# Patient Record
Sex: MALE | Race: WHITE | Employment: OTHER | URBAN - NONMETROPOLITAN AREA
[De-identification: names, ages, dates, MRNs, and addresses within clinical notes are randomized per-mention and may not be internally consistent; named-entity substitution may affect disease eponyms.]

---

## 2017-03-03 ENCOUNTER — TELEPHONE (OUTPATIENT)
Dept: CARDIOLOGY CLINIC | Age: 72
End: 2017-03-03

## 2017-03-17 ENCOUNTER — OFFICE VISIT (OUTPATIENT)
Dept: CARDIOLOGY CLINIC | Age: 72
End: 2017-03-17

## 2017-03-17 VITALS
WEIGHT: 169 LBS | SYSTOLIC BLOOD PRESSURE: 124 MMHG | BODY MASS INDEX: 25.03 KG/M2 | HEART RATE: 63 BPM | HEIGHT: 69 IN | DIASTOLIC BLOOD PRESSURE: 70 MMHG | OXYGEN SATURATION: 98 %

## 2017-03-17 DIAGNOSIS — E78.5 HYPERLIPIDEMIA, UNSPECIFIED HYPERLIPIDEMIA TYPE: ICD-10-CM

## 2017-03-17 DIAGNOSIS — Z72.0 TOBACCO ABUSE: ICD-10-CM

## 2017-03-17 DIAGNOSIS — I10 ESSENTIAL HYPERTENSION, BENIGN: Primary | ICD-10-CM

## 2017-03-17 DIAGNOSIS — I25.83 CORONARY ARTERY DISEASE DUE TO LIPID RICH PLAQUE: ICD-10-CM

## 2017-03-17 DIAGNOSIS — I25.10 CORONARY ARTERY DISEASE DUE TO LIPID RICH PLAQUE: ICD-10-CM

## 2017-03-17 PROCEDURE — 99214 OFFICE O/P EST MOD 30 MIN: CPT | Performed by: INTERNAL MEDICINE

## 2017-05-05 ENCOUNTER — NURSE ONLY (OUTPATIENT)
Dept: FAMILY MEDICINE CLINIC | Age: 72
End: 2017-05-05

## 2017-05-05 ENCOUNTER — TELEPHONE (OUTPATIENT)
Dept: FAMILY MEDICINE CLINIC | Age: 72
End: 2017-05-05

## 2017-05-05 DIAGNOSIS — E78.5 HYPERLIPIDEMIA, UNSPECIFIED HYPERLIPIDEMIA TYPE: Primary | ICD-10-CM

## 2017-05-05 DIAGNOSIS — E78.5 HYPERLIPIDEMIA, UNSPECIFIED HYPERLIPIDEMIA TYPE: ICD-10-CM

## 2017-05-05 PROCEDURE — 36415 COLL VENOUS BLD VENIPUNCTURE: CPT | Performed by: FAMILY MEDICINE

## 2017-05-06 LAB
ALBUMIN SERPL-MCNC: 4.6 G/DL (ref 3.4–5)
ALP BLD-CCNC: 97 U/L (ref 40–129)
ALT SERPL-CCNC: 12 U/L (ref 10–40)
AST SERPL-CCNC: 16 U/L (ref 15–37)
BILIRUB SERPL-MCNC: 1 MG/DL (ref 0–1)
BILIRUBIN DIRECT: <0.2 MG/DL (ref 0–0.3)
BILIRUBIN, INDIRECT: NORMAL MG/DL (ref 0–1)
CHOLESTEROL, TOTAL: 110 MG/DL (ref 0–199)
HDLC SERPL-MCNC: 29 MG/DL (ref 40–60)
LDL CHOLESTEROL CALCULATED: 61 MG/DL
TOTAL PROTEIN: 7.2 G/DL (ref 6.4–8.2)
TRIGL SERPL-MCNC: 102 MG/DL (ref 0–150)
VLDLC SERPL CALC-MCNC: 20 MG/DL

## 2017-05-10 ENCOUNTER — OFFICE VISIT (OUTPATIENT)
Dept: FAMILY MEDICINE CLINIC | Age: 72
End: 2017-05-10

## 2017-05-10 VITALS
WEIGHT: 166.4 LBS | HEART RATE: 82 BPM | BODY MASS INDEX: 24.65 KG/M2 | OXYGEN SATURATION: 98 % | HEIGHT: 69 IN | DIASTOLIC BLOOD PRESSURE: 76 MMHG | SYSTOLIC BLOOD PRESSURE: 122 MMHG

## 2017-05-10 DIAGNOSIS — Z23 NEED FOR PROPHYLACTIC VACCINATION AND INOCULATION AGAINST VARICELLA: ICD-10-CM

## 2017-05-10 DIAGNOSIS — E78.2 MIXED HYPERLIPIDEMIA: ICD-10-CM

## 2017-05-10 DIAGNOSIS — Z72.0 TOBACCO ABUSE: ICD-10-CM

## 2017-05-10 DIAGNOSIS — I25.83 CORONARY ARTERY DISEASE DUE TO LIPID RICH PLAQUE: ICD-10-CM

## 2017-05-10 DIAGNOSIS — I10 ESSENTIAL HYPERTENSION, BENIGN: Primary | ICD-10-CM

## 2017-05-10 DIAGNOSIS — Z12.5 PROSTATE CANCER SCREENING: ICD-10-CM

## 2017-05-10 DIAGNOSIS — I25.10 CORONARY ARTERY DISEASE DUE TO LIPID RICH PLAQUE: ICD-10-CM

## 2017-05-10 DIAGNOSIS — E55.9 VITAMIN D DEFICIENCY: ICD-10-CM

## 2017-05-10 PROCEDURE — G8510 SCR DEP NEG, NO PLAN REQD: HCPCS | Performed by: FAMILY MEDICINE

## 2017-05-10 PROCEDURE — 99214 OFFICE O/P EST MOD 30 MIN: CPT | Performed by: FAMILY MEDICINE

## 2017-05-10 PROCEDURE — 3288F FALL RISK ASSESSMENT DOCD: CPT | Performed by: FAMILY MEDICINE

## 2017-05-10 RX ORDER — NITROGLYCERIN 0.4 MG/1
0.4 TABLET SUBLINGUAL EVERY 5 MIN PRN
Qty: 25 TABLET | Refills: 1 | Status: SHIPPED | OUTPATIENT
Start: 2017-05-10 | End: 2018-06-18 | Stop reason: SDUPTHER

## 2017-05-10 ASSESSMENT — PATIENT HEALTH QUESTIONNAIRE - PHQ9
SUM OF ALL RESPONSES TO PHQ QUESTIONS 1-9: 0
2. FEELING DOWN, DEPRESSED OR HOPELESS: 0
1. LITTLE INTEREST OR PLEASURE IN DOING THINGS: 0
SUM OF ALL RESPONSES TO PHQ9 QUESTIONS 1 & 2: 0

## 2017-05-12 PROBLEM — E78.2 MIXED HYPERLIPIDEMIA: Status: ACTIVE | Noted: 2017-05-12

## 2017-06-25 DIAGNOSIS — I25.10 CORONARY ARTERY DISEASE DUE TO LIPID RICH PLAQUE: ICD-10-CM

## 2017-06-25 DIAGNOSIS — I25.83 CORONARY ARTERY DISEASE DUE TO LIPID RICH PLAQUE: ICD-10-CM

## 2017-06-26 RX ORDER — CLOPIDOGREL BISULFATE 75 MG/1
TABLET ORAL
Qty: 90 TABLET | Refills: 3 | Status: SHIPPED | OUTPATIENT
Start: 2017-06-26 | End: 2018-06-18 | Stop reason: SDUPTHER

## 2017-07-19 DIAGNOSIS — I25.83 CORONARY ARTERY DISEASE DUE TO LIPID RICH PLAQUE: ICD-10-CM

## 2017-07-19 DIAGNOSIS — I25.10 CORONARY ARTERY DISEASE DUE TO LIPID RICH PLAQUE: ICD-10-CM

## 2017-07-19 DIAGNOSIS — I10 ESSENTIAL HYPERTENSION, BENIGN: ICD-10-CM

## 2017-07-19 RX ORDER — AMLODIPINE BESYLATE 10 MG/1
TABLET ORAL
Qty: 90 TABLET | Refills: 3 | Status: SHIPPED | OUTPATIENT
Start: 2017-07-19 | End: 2018-06-18 | Stop reason: SDUPTHER

## 2017-09-08 DIAGNOSIS — E78.5 HYPERLIPIDEMIA: ICD-10-CM

## 2017-09-08 RX ORDER — SIMVASTATIN 40 MG
TABLET ORAL
Qty: 90 TABLET | Refills: 1 | Status: SHIPPED | OUTPATIENT
Start: 2017-09-08 | End: 2018-03-05 | Stop reason: SDUPTHER

## 2017-11-21 ENCOUNTER — TELEPHONE (OUTPATIENT)
Dept: FAMILY MEDICINE CLINIC | Age: 72
End: 2017-11-21

## 2017-11-21 DIAGNOSIS — Z12.11 COLON CANCER SCREENING: Primary | ICD-10-CM

## 2017-12-04 ENCOUNTER — OFFICE VISIT (OUTPATIENT)
Dept: FAMILY MEDICINE CLINIC | Age: 72
End: 2017-12-04

## 2017-12-04 VITALS
HEIGHT: 69 IN | DIASTOLIC BLOOD PRESSURE: 75 MMHG | OXYGEN SATURATION: 97 % | HEART RATE: 68 BPM | WEIGHT: 168.8 LBS | BODY MASS INDEX: 25 KG/M2 | TEMPERATURE: 97.8 F | SYSTOLIC BLOOD PRESSURE: 124 MMHG

## 2017-12-04 DIAGNOSIS — E78.2 MIXED HYPERLIPIDEMIA: ICD-10-CM

## 2017-12-04 DIAGNOSIS — E55.9 VITAMIN D DEFICIENCY: ICD-10-CM

## 2017-12-04 DIAGNOSIS — I10 ESSENTIAL HYPERTENSION, BENIGN: ICD-10-CM

## 2017-12-04 DIAGNOSIS — I25.83 CORONARY ARTERY DISEASE DUE TO LIPID RICH PLAQUE: Primary | ICD-10-CM

## 2017-12-04 DIAGNOSIS — Z12.5 PROSTATE CANCER SCREENING: ICD-10-CM

## 2017-12-04 DIAGNOSIS — Z12.11 COLON CANCER SCREENING: ICD-10-CM

## 2017-12-04 DIAGNOSIS — N52.9 ERECTILE DYSFUNCTION, UNSPECIFIED ERECTILE DYSFUNCTION TYPE: ICD-10-CM

## 2017-12-04 DIAGNOSIS — R19.5 POSITIVE FIT (FECAL IMMUNOCHEMICAL TEST): ICD-10-CM

## 2017-12-04 DIAGNOSIS — Z23 NEED FOR PROPHYLACTIC VACCINATION AND INOCULATION AGAINST VARICELLA: ICD-10-CM

## 2017-12-04 DIAGNOSIS — Z13.6 ENCOUNTER FOR ABDOMINAL AORTIC ANEURYSM (AAA) SCREENING: ICD-10-CM

## 2017-12-04 DIAGNOSIS — I25.10 CORONARY ARTERY DISEASE DUE TO LIPID RICH PLAQUE: Primary | ICD-10-CM

## 2017-12-04 DIAGNOSIS — Z23 NEED FOR INFLUENZA VACCINATION: ICD-10-CM

## 2017-12-04 DIAGNOSIS — Z72.0 TOBACCO ABUSE: ICD-10-CM

## 2017-12-04 LAB
CONTROL: PRESENT
HEMOCCULT STL QL: POSITIVE

## 2017-12-04 PROCEDURE — 36415 COLL VENOUS BLD VENIPUNCTURE: CPT | Performed by: FAMILY MEDICINE

## 2017-12-04 PROCEDURE — 82274 ASSAY TEST FOR BLOOD FECAL: CPT | Performed by: FAMILY MEDICINE

## 2017-12-04 PROCEDURE — 90688 IIV4 VACCINE SPLT 0.5 ML IM: CPT | Performed by: FAMILY MEDICINE

## 2017-12-04 PROCEDURE — 99214 OFFICE O/P EST MOD 30 MIN: CPT | Performed by: FAMILY MEDICINE

## 2017-12-04 PROCEDURE — G0008 ADMIN INFLUENZA VIRUS VAC: HCPCS | Performed by: FAMILY MEDICINE

## 2017-12-04 NOTE — PROGRESS NOTES
Patient presented today for Influenza vaccine. Patient tolerated with no reaction. Patient informed to call the office if any concerns. Vaccine Information Sheet, \"Influenza - Inactivated\"  given to Sharda Rowland,  and verbalized understanding. Patient responses:    Have you ever had a reaction to a flu vaccine? No  Are you able to eat eggs without adverse effects? Yes  Do you have any current illness? No  Have you ever had Guillian [place] Syndrome? No    Flu vaccine given per order. Please see immunization tab.

## 2017-12-04 NOTE — PROGRESS NOTES
SUBJECTIVE:    2017    Patricio Ritchie (: 1945)    71 y.o.    male    Prior to Visit Medications    Medication Sig Taking? Authorizing Provider   simvastatin (ZOCOR) 40 MG tablet TAKE 1 TABLET BY MOUTH NIGHTLY Yes Neal Iraheta MD   amLODIPine (NORVASC) 10 MG tablet TAKE 1 TABLET BY MOUTH DAILY. Yes Neal Iraheta MD   clopidogrel (PLAVIX) 75 MG tablet TAKE 1 TABLET BY MOUTH DAILY Yes Neal Iraheta MD   nitroGLYCERIN (NITROSTAT) 0.4 MG SL tablet Place 1 tablet under the tongue every 5 minutes as needed for Chest pain Yes Neal Iraheta MD   Blood Pressure Monitoring QUARTERMAIN) MISC 1 kit by Does not apply route daily Yes Neal Iraheta MD   tadalafil (CIALIS) 20 MG tablet Take 1 tablet by mouth as needed for Erectile Dysfunction Yes Valery Prince NP   lisinopril (PRINIVIL;ZESTRIL) 20 MG tablet Take 1 tablet by mouth daily Yes Presley Prince NP   [facility] VIT D 5000 HIGH-POTENCY 5000 UNITS CAPS capsule TAKE ONE CAPSULE BY MOUTH DAILY Yes Neal Iraheta MD   Omega 3 1000 MG CAPS Take  by mouth daily. Yes Historical Provider, MD   aspirin 81 MG EC tablet Take 81 mg by mouth daily. Yes Historical Provider, MD       ALLERGIES:  Allergies   Allergen Reactions    Ampicillin        Chief Complaint   Patient presents with    Hypertension    Hyperlipidemia    Coronary Artery Disease    Other     Vitamin D Deficiency    URI     Runny nose, congested x 3-4 days. Has hard time taking Fish Oil on time due to timing. Takes Cialis PRN. Not interested in Low Dose CT Lung. Has sinus infection x 3-4 days. Discussed positive FIT test and agreed to colonoscopy. HPI  Patricio Ritchie is asymptomatic from a coronary standpoint. No chest pain. No difficulty breathing. He states he doesn't really want to know if he would have lung cancer or not, but he is okay getting a colonoscopy. Is here for high blood pressure. Watching salt intake. Blood pressure checked at home - NO. Numbers good if checked? NA. Denies chest pain. Denies shortness of breath. Taking medications as prescribed. Is here for cholesterol. Tolerating medication. Trying to watch low-fat diet. Weight stable. No change in exercise. HX: (> 3 status chronic/inact. Prob) or  Wt Readings from Last 3 Encounters:   12/04/17 168 lb 12.8 oz (76.6 kg)   05/10/17 166 lb 6.4 oz (75.5 kg)   03/17/17 169 lb (76.7 kg)       Occupation Retired            HPI:  (>4 )  LOCATION:  QUALITY:SEVERITY:  DURATION:  TIMING:  CONTEXT:  MOD. FACTORS:  ASSOC. S/S:    Pertinent items are noted in HPI.  (+/- 2-9 systems)  Review of Systems   HENT: Positive for congestion ( and Runny nose). All other systems reviewed and are negative except as noted above  Additional review of systems may be scanned into the media section of this medical record. Any responses requiring further intervention were pursued. Past Medical History:   Diagnosis Date    CAD (coronary artery disease)     Carotid bruit     ED (erectile dysfunction)     Hyperlipidemia     Hypertension     Tobacco abuse        History reviewed. No pertinent family history.     Social History   Substance Use Topics    Smoking status: Current Every Day Smoker     Packs/day: 2.00    Smokeless tobacco: Never Used    Alcohol use No         Past Surgical History:   Procedure Laterality Date    CORONARY ANGIOPLASTY WITH STENT PLACEMENT         Immunization History   Administered Date(s) Administered    Influenza Virus Vaccine 10/23/2013, 11/10/2014, 11/11/2015    Influenza, Suellyn Morning, 3 Years and older, IM 11/10/2016    Pneumococcal 13-valent Conjugate (Befumsp96) 05/11/2015    Pneumococcal Polysaccharide (Nucvrupxz07) 04/17/2013    Tdap (Boostrix, Adacel) 05/11/2015       Vitals:    12/04/17 1133   BP: 124/75   Site: Left Arm   Position: Sitting   Cuff Size: Medium Adult   Pulse: 68   Temp: 97.8 °F (36.6 °C)   TempSrc: Temporal inoculation against varicella  -     zoster vaccine live, PF, (ZOSTAVAX) 41677 UNT/0.65ML injection; Inject 0.65 mLs into the skin once for 1 dose    Encounter for abdominal aortic aneurysm (AAA) screening  -     [place] screening for AAA; Future    Need for influenza vaccination  -     INFLUENZA, QUADV, 3 YRS AND OLDER, IM, MDV, 0.5ML (Apolinar Yadav)    Erectile dysfunction, unspecified erectile dysfunction type    Essential hypertension, benign  -     COMPREHENSIVE METABOLIC PANEL    Mixed hyperlipidemia    Vitamin D deficiency  -     VITAMIN D 25 HYDROXY    Prostate cancer screening  -     Psa screening    Positive FIT (fecal immunochemical test)  -     Mynor Sellers MD    Tobacco abuse    Other orders  -     Cancel: CT Lung Screening; Future  -     Cancel: Direct Screening Colonoscopy Referral  -     Cancel: Endoscopy, colon, diagnostic; Future          PLAN:    Colonoscopy, Influenza injection, Labs as indicated. Patient agrees with the colonoscopy although he does not with the CT lung screen but understands the risk. Tobacco abuse - Pt will not quit despite understanding of risks of continued tobacco use, including cancer, heart attacks, strokes or death. Lipids been acceptable blood pressure acceptable, fractions of Cialis work for him. Labs to be updated. He will have the ultrasound of the abdominal aorta. Asymptomatic from the coronary standpoint. Follow-up 6 months. Patient should call the office immediately with new or ongoing signs or symptoms or worsening, or proceed to the emergency room. All entries in chief complaint and history of present illness are reviewed and validated by me. No changes in past medical history, past surgical history, social history, or family history were noted during the patient encounter unless specifically listed above.   All updates of past medical history, past surgical history, social history, or family history were reviewed personally by me during the office visit. All problems listed in the assessment are stable unless noted otherwise. Medication profile reviewed personally by me during the office visit. Medication side effects and possible impairments from medications were discussed as applicable. Every effort has been made to assure accurate transcription by this voice recognition software. However, mistakes in transcription may still occur        IJose Manuel, am scribing for and in the presence of Kiersten Baumann MD. Electronically signed by Jose Manuel Santiago on 12/4/2017 at 12:28 PM    I, Dr. Fauzia Juan, personally performed the services described in this documentation, as scribed by the above signed scribe in my presence, and it is both accurate and complete. I agree with the Chief Complaint, ROS, and Past Histories independently gathered by the clinical support staff and the remaining scribed note accurately describes my personal service to the patient.       12/4/2017    1:13 PM

## 2017-12-05 LAB
A/G RATIO: 1.8 (ref 1.1–2.2)
ALBUMIN SERPL-MCNC: 4.8 G/DL (ref 3.4–5)
ALP BLD-CCNC: 97 U/L (ref 40–129)
ALT SERPL-CCNC: 15 U/L (ref 10–40)
ANION GAP SERPL CALCULATED.3IONS-SCNC: 17 MMOL/L (ref 3–16)
AST SERPL-CCNC: 19 U/L (ref 15–37)
BILIRUB SERPL-MCNC: 0.8 MG/DL (ref 0–1)
BUN BLDV-MCNC: 17 MG/DL (ref 7–20)
CALCIUM SERPL-MCNC: 9.6 MG/DL (ref 8.3–10.6)
CHLORIDE BLD-SCNC: 103 MMOL/L (ref 99–110)
CO2: 24 MMOL/L (ref 21–32)
CREAT SERPL-MCNC: 1 MG/DL (ref 0.8–1.3)
GFR AFRICAN AMERICAN: >60
GFR NON-AFRICAN AMERICAN: >60
GLOBULIN: 2.7 G/DL
GLUCOSE BLD-MCNC: 84 MG/DL (ref 70–99)
POTASSIUM SERPL-SCNC: 4.2 MMOL/L (ref 3.5–5.1)
PROSTATE SPECIFIC ANTIGEN: 2.68 NG/ML (ref 0–4)
SODIUM BLD-SCNC: 144 MMOL/L (ref 136–145)
TOTAL PROTEIN: 7.5 G/DL (ref 6.4–8.2)
VITAMIN D 25-HYDROXY: 46.8 NG/ML

## 2018-01-20 DIAGNOSIS — I10 ESSENTIAL HYPERTENSION, BENIGN: ICD-10-CM

## 2018-01-22 RX ORDER — LISINOPRIL 20 MG/1
TABLET ORAL
Qty: 90 TABLET | Refills: 3 | Status: SHIPPED | OUTPATIENT
Start: 2018-01-22 | End: 2018-02-07

## 2018-03-05 DIAGNOSIS — E78.5 HYPERLIPIDEMIA: ICD-10-CM

## 2018-03-05 RX ORDER — SIMVASTATIN 40 MG
TABLET ORAL
Qty: 90 TABLET | Refills: 3 | Status: SHIPPED | OUTPATIENT
Start: 2018-03-05 | End: 2018-06-18 | Stop reason: SDUPTHER

## 2018-04-13 ENCOUNTER — OFFICE VISIT (OUTPATIENT)
Dept: CARDIOLOGY CLINIC | Age: 73
End: 2018-04-13

## 2018-04-13 VITALS
HEART RATE: 79 BPM | DIASTOLIC BLOOD PRESSURE: 70 MMHG | WEIGHT: 164.08 LBS | SYSTOLIC BLOOD PRESSURE: 138 MMHG | OXYGEN SATURATION: 91 % | BODY MASS INDEX: 24.3 KG/M2 | HEIGHT: 69 IN

## 2018-04-13 DIAGNOSIS — I10 ESSENTIAL HYPERTENSION, BENIGN: Primary | ICD-10-CM

## 2018-04-13 DIAGNOSIS — I25.83 CORONARY ARTERY DISEASE DUE TO LIPID RICH PLAQUE: ICD-10-CM

## 2018-04-13 DIAGNOSIS — E78.2 MIXED HYPERLIPIDEMIA: ICD-10-CM

## 2018-04-13 DIAGNOSIS — I25.10 CORONARY ARTERY DISEASE DUE TO LIPID RICH PLAQUE: ICD-10-CM

## 2018-04-13 PROCEDURE — 99214 OFFICE O/P EST MOD 30 MIN: CPT | Performed by: INTERNAL MEDICINE

## 2018-06-06 ENCOUNTER — TELEPHONE (OUTPATIENT)
Dept: FAMILY MEDICINE CLINIC | Age: 73
End: 2018-06-06

## 2018-06-11 ENCOUNTER — NURSE ONLY (OUTPATIENT)
Dept: FAMILY MEDICINE CLINIC | Age: 73
End: 2018-06-11

## 2018-06-11 DIAGNOSIS — I10 ESSENTIAL HYPERTENSION, BENIGN: Primary | ICD-10-CM

## 2018-06-11 DIAGNOSIS — I25.83 CORONARY ARTERY DISEASE DUE TO LIPID RICH PLAQUE: ICD-10-CM

## 2018-06-11 DIAGNOSIS — E55.9 VITAMIN D DEFICIENCY: ICD-10-CM

## 2018-06-11 DIAGNOSIS — I25.10 CORONARY ARTERY DISEASE DUE TO LIPID RICH PLAQUE: ICD-10-CM

## 2018-06-11 LAB
A/G RATIO: 2 (ref 1.1–2.2)
ALBUMIN SERPL-MCNC: 4.5 G/DL (ref 3.4–5)
ALP BLD-CCNC: 83 U/L (ref 40–129)
ALT SERPL-CCNC: 14 U/L (ref 10–40)
ANION GAP SERPL CALCULATED.3IONS-SCNC: 15 MMOL/L (ref 3–16)
AST SERPL-CCNC: 18 U/L (ref 15–37)
BILIRUB SERPL-MCNC: 0.9 MG/DL (ref 0–1)
BUN BLDV-MCNC: 20 MG/DL (ref 7–20)
CALCIUM SERPL-MCNC: 9.2 MG/DL (ref 8.3–10.6)
CHLORIDE BLD-SCNC: 107 MMOL/L (ref 99–110)
CHOLESTEROL, TOTAL: 121 MG/DL (ref 0–199)
CO2: 22 MMOL/L (ref 21–32)
CREAT SERPL-MCNC: 1 MG/DL (ref 0.8–1.3)
GFR AFRICAN AMERICAN: >60
GFR NON-AFRICAN AMERICAN: >60
GLOBULIN: 2.3 G/DL
GLUCOSE BLD-MCNC: 92 MG/DL (ref 70–99)
HDLC SERPL-MCNC: 32 MG/DL (ref 40–60)
LDL CHOLESTEROL CALCULATED: 71 MG/DL
POTASSIUM SERPL-SCNC: 4.5 MMOL/L (ref 3.5–5.1)
SODIUM BLD-SCNC: 144 MMOL/L (ref 136–145)
TOTAL PROTEIN: 6.8 G/DL (ref 6.4–8.2)
TRIGL SERPL-MCNC: 92 MG/DL (ref 0–150)
TSH SERPL DL<=0.05 MIU/L-ACNC: 2.31 UIU/ML (ref 0.27–4.2)
VITAMIN D 25-HYDROXY: 31.9 NG/ML
VLDLC SERPL CALC-MCNC: 18 MG/DL

## 2018-06-11 PROCEDURE — 36415 COLL VENOUS BLD VENIPUNCTURE: CPT | Performed by: FAMILY MEDICINE

## 2018-06-18 ENCOUNTER — OFFICE VISIT (OUTPATIENT)
Dept: FAMILY MEDICINE CLINIC | Age: 73
End: 2018-06-18

## 2018-06-18 VITALS
OXYGEN SATURATION: 97 % | WEIGHT: 160.2 LBS | HEART RATE: 68 BPM | HEIGHT: 69 IN | DIASTOLIC BLOOD PRESSURE: 66 MMHG | RESPIRATION RATE: 16 BRPM | BODY MASS INDEX: 23.73 KG/M2 | SYSTOLIC BLOOD PRESSURE: 118 MMHG

## 2018-06-18 DIAGNOSIS — I25.10 CORONARY ARTERY DISEASE DUE TO LIPID RICH PLAQUE: ICD-10-CM

## 2018-06-18 DIAGNOSIS — I10 ESSENTIAL HYPERTENSION, BENIGN: ICD-10-CM

## 2018-06-18 DIAGNOSIS — E78.2 MIXED HYPERLIPIDEMIA: ICD-10-CM

## 2018-06-18 DIAGNOSIS — E55.9 VITAMIN D DEFICIENCY: Primary | ICD-10-CM

## 2018-06-18 DIAGNOSIS — E78.5 HYPERLIPIDEMIA, UNSPECIFIED HYPERLIPIDEMIA TYPE: ICD-10-CM

## 2018-06-18 DIAGNOSIS — N52.9 ERECTILE DYSFUNCTION, UNSPECIFIED ERECTILE DYSFUNCTION TYPE: ICD-10-CM

## 2018-06-18 DIAGNOSIS — I25.83 CORONARY ARTERY DISEASE DUE TO LIPID RICH PLAQUE: ICD-10-CM

## 2018-06-18 DIAGNOSIS — Z72.0 TOBACCO ABUSE: ICD-10-CM

## 2018-06-18 PROCEDURE — 99214 OFFICE O/P EST MOD 30 MIN: CPT | Performed by: FAMILY MEDICINE

## 2018-06-18 RX ORDER — NITROGLYCERIN 0.4 MG/1
0.4 TABLET SUBLINGUAL EVERY 5 MIN PRN
Qty: 25 TABLET | Refills: 1 | Status: SHIPPED | OUTPATIENT
Start: 2018-06-18 | End: 2022-06-30 | Stop reason: SDUPTHER

## 2018-06-18 RX ORDER — SIMVASTATIN 40 MG
TABLET ORAL
Qty: 90 TABLET | Refills: 3 | Status: SHIPPED | OUTPATIENT
Start: 2018-06-18 | End: 2019-09-09 | Stop reason: SDUPTHER

## 2018-06-18 RX ORDER — CLOPIDOGREL BISULFATE 75 MG/1
TABLET ORAL
Qty: 90 TABLET | Refills: 3 | Status: SHIPPED | OUTPATIENT
Start: 2018-06-18 | End: 2019-06-12 | Stop reason: SDUPTHER

## 2018-06-18 RX ORDER — TADALAFIL 20 MG/1
20 TABLET ORAL PRN
Qty: 10 TABLET | Refills: 3 | Status: SHIPPED | OUTPATIENT
Start: 2018-06-18

## 2018-06-18 RX ORDER — TADALAFIL 20 MG/1
20 TABLET ORAL PRN
Qty: 10 TABLET | Refills: 3 | Status: SHIPPED | OUTPATIENT
Start: 2018-06-18 | End: 2018-06-18 | Stop reason: SDUPTHER

## 2018-06-18 RX ORDER — AMLODIPINE BESYLATE 10 MG/1
TABLET ORAL
Qty: 90 TABLET | Refills: 3 | Status: SHIPPED | OUTPATIENT
Start: 2018-06-18 | End: 2019-07-10 | Stop reason: SDUPTHER

## 2018-06-18 RX ORDER — NITROGLYCERIN 0.4 MG/1
0.4 TABLET SUBLINGUAL EVERY 5 MIN PRN
Qty: 25 TABLET | Refills: 1 | Status: CANCELLED | OUTPATIENT
Start: 2018-06-18

## 2018-06-18 ASSESSMENT — PATIENT HEALTH QUESTIONNAIRE - PHQ9
SUM OF ALL RESPONSES TO PHQ QUESTIONS 1-9: 0
SUM OF ALL RESPONSES TO PHQ9 QUESTIONS 1 & 2: 0
1. LITTLE INTEREST OR PLEASURE IN DOING THINGS: 0
2. FEELING DOWN, DEPRESSED OR HOPELESS: 0

## 2019-01-07 ENCOUNTER — OFFICE VISIT (OUTPATIENT)
Dept: FAMILY MEDICINE CLINIC | Age: 74
End: 2019-01-07
Payer: MEDICARE

## 2019-01-07 VITALS
HEART RATE: 76 BPM | OXYGEN SATURATION: 98 % | DIASTOLIC BLOOD PRESSURE: 70 MMHG | SYSTOLIC BLOOD PRESSURE: 120 MMHG | HEIGHT: 68 IN | WEIGHT: 165.8 LBS | BODY MASS INDEX: 25.13 KG/M2

## 2019-01-07 DIAGNOSIS — E55.9 VITAMIN D DEFICIENCY: Primary | ICD-10-CM

## 2019-01-07 DIAGNOSIS — Z13.6 ENCOUNTER FOR ABDOMINAL AORTIC ANEURYSM (AAA) SCREENING: Primary | ICD-10-CM

## 2019-01-07 DIAGNOSIS — E78.2 MIXED HYPERLIPIDEMIA: ICD-10-CM

## 2019-01-07 DIAGNOSIS — R19.5 POSITIVE FIT (FECAL IMMUNOCHEMICAL TEST): ICD-10-CM

## 2019-01-07 DIAGNOSIS — Z12.5 PROSTATE CANCER SCREENING: ICD-10-CM

## 2019-01-07 DIAGNOSIS — Z11.59 ENCOUNTER FOR HEPATITIS C SCREENING TEST FOR LOW RISK PATIENT: ICD-10-CM

## 2019-01-07 DIAGNOSIS — I10 ESSENTIAL HYPERTENSION, BENIGN: ICD-10-CM

## 2019-01-07 DIAGNOSIS — I25.83 CORONARY ARTERY DISEASE DUE TO LIPID RICH PLAQUE: ICD-10-CM

## 2019-01-07 DIAGNOSIS — Z23 NEED FOR INFLUENZA VACCINATION: ICD-10-CM

## 2019-01-07 DIAGNOSIS — Z72.0 TOBACCO ABUSE: ICD-10-CM

## 2019-01-07 DIAGNOSIS — I25.10 CORONARY ARTERY DISEASE DUE TO LIPID RICH PLAQUE: ICD-10-CM

## 2019-01-07 LAB
BASOPHILS ABSOLUTE: 0 K/UL (ref 0–0.2)
BASOPHILS RELATIVE PERCENT: 1.1 %
EOSINOPHILS ABSOLUTE: 0.3 K/UL (ref 0–0.6)
EOSINOPHILS RELATIVE PERCENT: 5.5 %
HCT VFR BLD CALC: 41.8 % (ref 40.5–52.5)
HEMOGLOBIN: 14 G/DL (ref 13.5–17.5)
LYMPHOCYTES ABSOLUTE: 0.7 K/UL (ref 1–5.1)
LYMPHOCYTES RELATIVE PERCENT: 16.1 %
MCH RBC QN AUTO: 33.1 PG (ref 26–34)
MCHC RBC AUTO-ENTMCNC: 33.5 G/DL (ref 31–36)
MCV RBC AUTO: 98.9 FL (ref 80–100)
MONOCYTES ABSOLUTE: 0.4 K/UL (ref 0–1.3)
MONOCYTES RELATIVE PERCENT: 7.8 %
NEUTROPHILS ABSOLUTE: 3.2 K/UL (ref 1.7–7.7)
NEUTROPHILS RELATIVE PERCENT: 69.5 %
PDW BLD-RTO: 15.1 % (ref 12.4–15.4)
PLATELET # BLD: 115 K/UL (ref 135–450)
PMV BLD AUTO: 10.1 FL (ref 5–10.5)
RBC # BLD: 4.23 M/UL (ref 4.2–5.9)
WBC # BLD: 4.6 K/UL (ref 4–11)

## 2019-01-07 PROCEDURE — 36415 COLL VENOUS BLD VENIPUNCTURE: CPT | Performed by: FAMILY MEDICINE

## 2019-01-07 PROCEDURE — G0008 ADMIN INFLUENZA VIRUS VAC: HCPCS | Performed by: FAMILY MEDICINE

## 2019-01-07 PROCEDURE — 90688 IIV4 VACCINE SPLT 0.5 ML IM: CPT | Performed by: FAMILY MEDICINE

## 2019-01-07 PROCEDURE — 99214 OFFICE O/P EST MOD 30 MIN: CPT | Performed by: FAMILY MEDICINE

## 2019-01-08 LAB
HEPATITIS C ANTIBODY INTERPRETATION: NORMAL
PROSTATE SPECIFIC ANTIGEN: 3.5 NG/ML (ref 0–4)
VITAMIN D 25-HYDROXY: 26.7 NG/ML

## 2019-01-09 DIAGNOSIS — E55.9 VITAMIN D DEFICIENCY: Primary | ICD-10-CM

## 2019-01-25 DIAGNOSIS — I10 ESSENTIAL HYPERTENSION, BENIGN: ICD-10-CM

## 2019-01-25 RX ORDER — LISINOPRIL 20 MG/1
20 TABLET ORAL DAILY
Qty: 90 TABLET | Refills: 3 | Status: SHIPPED | OUTPATIENT
Start: 2019-01-25 | End: 2020-01-16

## 2019-04-23 NOTE — PROGRESS NOTES
Disp: 10 tablet, Rfl: 3    Blood Pressure Monitoring (SPHYGMOMANOMETER) MISC, 1 kit by Does not apply route daily, Disp: 1 each, Rfl: 0    [employer]S VIT D 5000 HIGH-POTENCY 5000 UNITS CAPS capsule, TAKE ONE CAPSULE BY MOUTH DAILY, Disp: 30 capsule, Rfl: 11    Omega 3 1000 MG CAPS, Take by mouth daily , Disp: , Rfl:     aspirin 81 MG EC tablet, Take 81 mg by mouth daily. , Disp: , Rfl:     Review of Systems:  Cardiac: No chest pain and no palpitations. Respiratory: No new SOB, PND, orthopnea or cough. Neurological: No syncope or TIA. General: No major weight gain or loss, no fatigue, no weakness, no fever. Musculoskeletal: No new muscle or joint pain. GI: No change in bowel habits. Psychiatric: No anxiety, no panic, no insomnia, no depression. Skin: No rash. Lab Results   Component Value Date    CHOL 121 06/11/2018     No results for input(s): AST, ALT, ALB, BILIDIR, BILITOT, ALKPHOS in the last 72 hours. Vitals:    04/26/19 1344   BP: 122/70   Pulse: 60   SpO2: 98%       Physical Examination  Constitutional: He is oriented to person, place and time. He appears well developed and well nourished. Head: Normocephalic and atraumatic. Neck: Neck supple. No JVD present. Carotid bruit is not present. No mass and no thyromegaly present. Cardiovascular: Normal rate, regular rhythm, normal heart sounds and intact distal pulses. Exam reveals no gallop and no friction rub. No murmur heard. Pulmonary/Chest: Effort normal and breath sounds normal.  No respiratory distress. He has no wheezes, rhonchi or rales. Abdominal: Soft, non-tender. Bowel sounds and aorta are normal.  He exhibits no organomegaly, mass or bruit. Extremities: No edema, cyanosis or clubbing. Neurological: He is alert, and oriented to person, place and time. He has normal reflexes. No cranial nerve deficit. Coordination normal.  Skin: Skin is warm and dry. There is no rash or diaphoresis. Phychiatric: He has a normal mood and affect.  He speech is normal and behavior is normal.  Judgement, cognition and memory are normal.    No components found for: CHLPLTotal Cholesterol 139      10/28/2014   Lab Results   Component Value Date    TRIG 92 06/11/2018    TRIG 102 05/05/2017    TRIG 132 10/24/2016     Lab Results   Component Value Date    HDL 32 (L) 06/11/2018    HDL 29 (L) 05/05/2017    HDL 31 (L) 10/24/2016     Lab Results   Component Value Date    LDLCALC 71 06/11/2018    LDLCALC 61 05/05/2017    LDLCALC 72 10/24/2016     Lab Results   Component Value Date    LABVLDL 18 06/11/2018    LABVLDL 20 05/05/2017    LABVLDL 26 10/24/2016       Patient Active Problem List   Diagnoses   - HTN (hypertension):  Stable and well controlled and will continue present medical regimen. - CAD (coronary artery disease):   Now c/o heavier exertion causing SOB which may be anginal equivalent. No cardiac testing since 2012 and given hx 2V CAD s/p stents he merits non-invasive cardiac evaluation.     - Hyperlipidemia:  Most recent labs 6/11/18 see results above I personally reviewed. Well controlled and will continue current medical regimen. At goal except for lower HDL which doesn't show definite clinical benefit treating. Continue present zocor. Repeat Lipid/Liver panels per PCP. -   Tobacco abuse: He is still not interested in quitting and will NOT quit. PLAN:  1. Smoking cessation and education discussed. 2. Lab work through PCP  3. I will order a GXT nuclear stress test to assess myocardial perfusion and LV function. 4. Follow up in 12 months     This note was scribed in the presence of Radha Workman MD by Lore Hearn RN     I, Dr. Marcus Conner, personally performed the services described in this documentation, as scribed by the above signed scribe in my presence. It is both accurate and complete to my knowledge.  I agree with the details independently gathered by the clinical support staff, while the remaining scribed note accurately describes my personal service to the patient. Cost of prescription medications and patient compliance have been reviewed with patient. All questions answered. Dr. Liss Granados.  Sumaya Valente

## 2019-04-26 ENCOUNTER — OFFICE VISIT (OUTPATIENT)
Dept: CARDIOLOGY CLINIC | Age: 74
End: 2019-04-26
Payer: MEDICARE

## 2019-04-26 VITALS
SYSTOLIC BLOOD PRESSURE: 122 MMHG | HEART RATE: 60 BPM | WEIGHT: 164.08 LBS | HEIGHT: 67 IN | OXYGEN SATURATION: 98 % | BODY MASS INDEX: 25.75 KG/M2 | DIASTOLIC BLOOD PRESSURE: 70 MMHG

## 2019-04-26 DIAGNOSIS — I25.83 CORONARY ARTERY DISEASE DUE TO LIPID RICH PLAQUE: Primary | ICD-10-CM

## 2019-04-26 DIAGNOSIS — E78.2 MIXED HYPERLIPIDEMIA: ICD-10-CM

## 2019-04-26 DIAGNOSIS — I25.10 CORONARY ARTERY DISEASE DUE TO LIPID RICH PLAQUE: Primary | ICD-10-CM

## 2019-04-26 DIAGNOSIS — I10 ESSENTIAL HYPERTENSION, BENIGN: ICD-10-CM

## 2019-04-26 DIAGNOSIS — R06.09 DOE (DYSPNEA ON EXERTION): ICD-10-CM

## 2019-04-26 PROCEDURE — 99214 OFFICE O/P EST MOD 30 MIN: CPT | Performed by: INTERNAL MEDICINE

## 2019-04-26 NOTE — PATIENT INSTRUCTIONS
PLAN:  1. Smoking cessation and education discussed. 2. Lab work through PCP  3. Will Check GXT myoview stress test,  4.  Follow up in 12 months

## 2019-05-08 ENCOUNTER — HOSPITAL ENCOUNTER (OUTPATIENT)
Dept: NUCLEAR MEDICINE | Age: 74
Discharge: HOME | End: 2019-05-08
Payer: MEDICARE

## 2019-05-08 ENCOUNTER — HOSPITAL ENCOUNTER (OUTPATIENT)
Dept: NON INVASIVE DIAGNOSTICS | Age: 74
Discharge: HOME | End: 2019-05-08
Payer: MEDICARE

## 2019-05-08 DIAGNOSIS — I25.10 CORONARY ARTERY DISEASE DUE TO LIPID RICH PLAQUE: ICD-10-CM

## 2019-05-08 DIAGNOSIS — I25.83 CORONARY ARTERY DISEASE DUE TO LIPID RICH PLAQUE: ICD-10-CM

## 2019-05-08 DIAGNOSIS — R06.09 DOE (DYSPNEA ON EXERTION): ICD-10-CM

## 2019-05-08 LAB
LV EF: 61 %
LVEF MODALITY: NORMAL

## 2019-05-08 PROCEDURE — 93017 CV STRESS TEST TRACING ONLY: CPT

## 2019-05-08 PROCEDURE — 3430000000 HC RX DIAGNOSTIC RADIOPHARMACEUTICAL: Performed by: INTERNAL MEDICINE

## 2019-05-08 PROCEDURE — A9502 TC99M TETROFOSMIN: HCPCS | Performed by: INTERNAL MEDICINE

## 2019-05-08 PROCEDURE — 78452 HT MUSCLE IMAGE SPECT MULT: CPT

## 2019-05-08 RX ADMIN — TETROFOSMIN 11 MILLICURIE: 1.38 INJECTION, POWDER, LYOPHILIZED, FOR SOLUTION INTRAVENOUS at 09:37

## 2019-05-08 RX ADMIN — TETROFOSMIN 33.7 MILLICURIE: 1.38 INJECTION, POWDER, LYOPHILIZED, FOR SOLUTION INTRAVENOUS at 10:43

## 2019-05-08 ASSESSMENT — PAIN - FUNCTIONAL ASSESSMENT: PAIN_FUNCTIONAL_ASSESSMENT: 0-10

## 2019-06-12 DIAGNOSIS — I25.83 CORONARY ARTERY DISEASE DUE TO LIPID RICH PLAQUE: ICD-10-CM

## 2019-06-12 DIAGNOSIS — I25.10 CORONARY ARTERY DISEASE DUE TO LIPID RICH PLAQUE: ICD-10-CM

## 2019-06-12 RX ORDER — CLOPIDOGREL BISULFATE 75 MG/1
TABLET ORAL
Qty: 90 TABLET | Refills: 3 | Status: SHIPPED | OUTPATIENT
Start: 2019-06-12 | End: 2020-06-15

## 2019-06-12 NOTE — TELEPHONE ENCOUNTER
Refill request for Plavix medication.      Name of Pharmacy- [facility]    Last visit - 1/7/19     Pending visit - 7/9/19    Last refill -6/18/18

## 2019-06-26 ENCOUNTER — TELEPHONE (OUTPATIENT)
Dept: FAMILY MEDICINE CLINIC | Age: 74
End: 2019-06-26

## 2019-06-26 NOTE — TELEPHONE ENCOUNTER
Attempted to contact patient on 6/26/2019. Result: left message on the patient's voicemail asking patient to return my call. Pre-Visit planning not completed.

## 2019-07-09 ENCOUNTER — OFFICE VISIT (OUTPATIENT)
Dept: FAMILY MEDICINE CLINIC | Age: 74
End: 2019-07-09
Payer: MEDICARE

## 2019-07-09 VITALS
BODY MASS INDEX: 25.44 KG/M2 | SYSTOLIC BLOOD PRESSURE: 108 MMHG | WEIGHT: 162.4 LBS | HEART RATE: 61 BPM | OXYGEN SATURATION: 97 % | DIASTOLIC BLOOD PRESSURE: 70 MMHG

## 2019-07-09 DIAGNOSIS — E78.2 MIXED HYPERLIPIDEMIA: ICD-10-CM

## 2019-07-09 DIAGNOSIS — I25.10 CORONARY ARTERY DISEASE DUE TO LIPID RICH PLAQUE: ICD-10-CM

## 2019-07-09 DIAGNOSIS — R19.5 POSITIVE FIT (FECAL IMMUNOCHEMICAL TEST): ICD-10-CM

## 2019-07-09 DIAGNOSIS — I25.83 CORONARY ARTERY DISEASE DUE TO LIPID RICH PLAQUE: ICD-10-CM

## 2019-07-09 DIAGNOSIS — Z72.0 TOBACCO ABUSE: ICD-10-CM

## 2019-07-09 DIAGNOSIS — E55.9 VITAMIN D DEFICIENCY: ICD-10-CM

## 2019-07-09 DIAGNOSIS — I10 ESSENTIAL HYPERTENSION, BENIGN: Primary | ICD-10-CM

## 2019-07-09 LAB
A/G RATIO: 2 (ref 1.1–2.2)
ALBUMIN SERPL-MCNC: 4.7 G/DL (ref 3.4–5)
ALP BLD-CCNC: 81 U/L (ref 40–129)
ALT SERPL-CCNC: 14 U/L (ref 10–40)
ANION GAP SERPL CALCULATED.3IONS-SCNC: 15 MMOL/L (ref 3–16)
AST SERPL-CCNC: 17 U/L (ref 15–37)
BILIRUB SERPL-MCNC: 0.6 MG/DL (ref 0–1)
BUN BLDV-MCNC: 23 MG/DL (ref 7–20)
CALCIUM SERPL-MCNC: 9.5 MG/DL (ref 8.3–10.6)
CHLORIDE BLD-SCNC: 107 MMOL/L (ref 99–110)
CHOLESTEROL, TOTAL: 104 MG/DL (ref 0–199)
CO2: 21 MMOL/L (ref 21–32)
CREAT SERPL-MCNC: 1.7 MG/DL (ref 0.8–1.3)
GFR AFRICAN AMERICAN: 48
GFR NON-AFRICAN AMERICAN: 40
GLOBULIN: 2.4 G/DL
GLUCOSE BLD-MCNC: 104 MG/DL (ref 70–99)
HDLC SERPL-MCNC: 30 MG/DL (ref 40–60)
LDL CHOLESTEROL CALCULATED: 60 MG/DL
POTASSIUM SERPL-SCNC: 5 MMOL/L (ref 3.5–5.1)
SODIUM BLD-SCNC: 143 MMOL/L (ref 136–145)
TOTAL PROTEIN: 7.1 G/DL (ref 6.4–8.2)
TRIGL SERPL-MCNC: 68 MG/DL (ref 0–150)
VITAMIN D 25-HYDROXY: 78.6 NG/ML
VLDLC SERPL CALC-MCNC: 14 MG/DL

## 2019-07-09 PROCEDURE — 36415 COLL VENOUS BLD VENIPUNCTURE: CPT | Performed by: FAMILY MEDICINE

## 2019-07-09 PROCEDURE — 99214 OFFICE O/P EST MOD 30 MIN: CPT | Performed by: FAMILY MEDICINE

## 2019-07-09 NOTE — PATIENT INSTRUCTIONS
Patient should call the office immediately with new or ongoing signs or symptoms or worsening, or proceed to the emergency room. If you are on medications which could impair your senses, you are at risk of weakness, falls, dizziness, or drowsiness. You should be careful during activities which could place you at risk of harm, such as climbing, using stairs, operating machinery, or driving vehicles. If you feel you cannot safely do these activities, you should request others to help you, or avoid the activities altogether. If you are drowsy for any other reason, you should use the same precautions as listed above. You may receive a survey regarding the care you received during your visit. Your input is valuable to us. We encourage you to complete and return your survey.

## 2019-07-10 DIAGNOSIS — I25.10 CORONARY ARTERY DISEASE DUE TO LIPID RICH PLAQUE: ICD-10-CM

## 2019-07-10 DIAGNOSIS — I25.83 CORONARY ARTERY DISEASE DUE TO LIPID RICH PLAQUE: ICD-10-CM

## 2019-07-10 DIAGNOSIS — I10 ESSENTIAL HYPERTENSION, BENIGN: ICD-10-CM

## 2019-07-10 DIAGNOSIS — R94.4 DECREASED CALCULATED GFR: Primary | ICD-10-CM

## 2019-07-10 RX ORDER — AMLODIPINE BESYLATE 10 MG/1
TABLET ORAL
Qty: 90 TABLET | Refills: 3 | Status: SHIPPED | OUTPATIENT
Start: 2019-07-10 | End: 2020-07-15

## 2019-07-16 ENCOUNTER — TELEPHONE (OUTPATIENT)
Dept: FAMILY MEDICINE CLINIC | Age: 74
End: 2019-07-16

## 2019-08-15 ENCOUNTER — NURSE ONLY (OUTPATIENT)
Dept: FAMILY MEDICINE CLINIC | Age: 74
End: 2019-08-15
Payer: MEDICARE

## 2019-08-15 DIAGNOSIS — R94.4 DECREASED CALCULATED GFR: ICD-10-CM

## 2019-08-15 PROCEDURE — 36415 COLL VENOUS BLD VENIPUNCTURE: CPT | Performed by: FAMILY MEDICINE

## 2019-08-16 LAB
ANION GAP SERPL CALCULATED.3IONS-SCNC: 19 MMOL/L (ref 3–16)
BUN BLDV-MCNC: 27 MG/DL (ref 7–20)
CALCIUM SERPL-MCNC: 9.6 MG/DL (ref 8.3–10.6)
CHLORIDE BLD-SCNC: 105 MMOL/L (ref 99–110)
CO2: 17 MMOL/L (ref 21–32)
CREAT SERPL-MCNC: 1.6 MG/DL (ref 0.8–1.3)
GFR AFRICAN AMERICAN: 52
GFR NON-AFRICAN AMERICAN: 43
GLUCOSE BLD-MCNC: 70 MG/DL (ref 70–99)
POTASSIUM SERPL-SCNC: 4.7 MMOL/L (ref 3.5–5.1)
SODIUM BLD-SCNC: 141 MMOL/L (ref 136–145)

## 2019-09-05 ENCOUNTER — CLINICAL DOCUMENTATION (OUTPATIENT)
Dept: OTHER | Facility: CLINIC | Age: 74
End: 2019-09-05

## 2019-09-09 DIAGNOSIS — E78.5 HYPERLIPIDEMIA, UNSPECIFIED HYPERLIPIDEMIA TYPE: ICD-10-CM

## 2019-09-09 RX ORDER — SIMVASTATIN 40 MG
TABLET ORAL
Qty: 90 TABLET | Refills: 3 | Status: SHIPPED | OUTPATIENT
Start: 2019-09-09 | End: 2020-09-14

## 2020-01-14 ENCOUNTER — OFFICE VISIT (OUTPATIENT)
Dept: FAMILY MEDICINE CLINIC | Age: 75
End: 2020-01-14
Payer: COMMERCIAL

## 2020-01-14 VITALS
DIASTOLIC BLOOD PRESSURE: 70 MMHG | HEART RATE: 67 BPM | OXYGEN SATURATION: 97 % | BODY MASS INDEX: 26.06 KG/M2 | SYSTOLIC BLOOD PRESSURE: 118 MMHG | HEIGHT: 67 IN | WEIGHT: 166 LBS

## 2020-01-14 LAB
A/G RATIO: 1.7 (ref 1.1–2.2)
ALBUMIN SERPL-MCNC: 4.6 G/DL (ref 3.4–5)
ALP BLD-CCNC: 87 U/L (ref 40–129)
ALT SERPL-CCNC: 14 U/L (ref 10–40)
ANION GAP SERPL CALCULATED.3IONS-SCNC: 17 MMOL/L (ref 3–16)
AST SERPL-CCNC: 18 U/L (ref 15–37)
BILIRUB SERPL-MCNC: 0.7 MG/DL (ref 0–1)
BUN BLDV-MCNC: 21 MG/DL (ref 7–20)
CALCIUM SERPL-MCNC: 9.5 MG/DL (ref 8.3–10.6)
CHLORIDE BLD-SCNC: 104 MMOL/L (ref 99–110)
CHOLESTEROL, TOTAL: 124 MG/DL (ref 0–199)
CO2: 22 MMOL/L (ref 21–32)
CREAT SERPL-MCNC: 1.4 MG/DL (ref 0.8–1.3)
GFR AFRICAN AMERICAN: >60
GFR NON-AFRICAN AMERICAN: 50
GLOBULIN: 2.7 G/DL
GLUCOSE BLD-MCNC: 100 MG/DL (ref 70–99)
HDLC SERPL-MCNC: 30 MG/DL (ref 40–60)
LDL CHOLESTEROL CALCULATED: 66 MG/DL
POTASSIUM SERPL-SCNC: 4.1 MMOL/L (ref 3.5–5.1)
SODIUM BLD-SCNC: 143 MMOL/L (ref 136–145)
TOTAL PROTEIN: 7.3 G/DL (ref 6.4–8.2)
TRIGL SERPL-MCNC: 140 MG/DL (ref 0–150)
VITAMIN D 25-HYDROXY: 81.2 NG/ML
VLDLC SERPL CALC-MCNC: 28 MG/DL

## 2020-01-14 PROCEDURE — 90471 IMMUNIZATION ADMIN: CPT | Performed by: FAMILY MEDICINE

## 2020-01-14 PROCEDURE — 90686 IIV4 VACC NO PRSV 0.5 ML IM: CPT | Performed by: FAMILY MEDICINE

## 2020-01-14 PROCEDURE — G0296 VISIT TO DETERM LDCT ELIG: HCPCS | Performed by: FAMILY MEDICINE

## 2020-01-14 PROCEDURE — 36415 COLL VENOUS BLD VENIPUNCTURE: CPT | Performed by: FAMILY MEDICINE

## 2020-01-14 PROCEDURE — 99214 OFFICE O/P EST MOD 30 MIN: CPT | Performed by: FAMILY MEDICINE

## 2020-01-14 NOTE — PROGRESS NOTES
normal   Psychiatric:         Behavior: Behavior normal.         Thought Content: Thought content normal.         Judgment: Judgment normal.                  ASSESSMENT PLAN      Diagnosis Orders   1. Essential hypertension, benign     2. Flu vaccine need  INFLUENZA, QUADV, 3 YRS AND OLDER, IM PF, PREFILL SYR OR SDV, 0.5ML (AFLURIA QUADV, PF)   3. Screening for colon cancer  ABBY Aguiar MD, Gastroenterology, [facility]   4. Mixed hyperlipidemia  Lipid Panel    Comprehensive Metabolic Panel   5. Vitamin D deficiency  Vitamin D 25 Hydroxy   6. Coronary artery disease due to lipid rich plaque  Lipid Panel   7. Tobacco abuse     8. Personal history of tobacco use  [place] VISIT TO DISCUSS LUNG CA SCREEN W LDCT   Blood pressure acceptable. He did agree to the CT lung screen as well as going to see GI. Continue lipid monitoring as needed. Vitamin D levels will be monitored as needed no symptoms of coronary insufficiency. Tobacco abuse - Pt will not quit despite understanding of risks of continued tobacco use, including cancer, heart attacks, strokes or death. He states he enjoys smoking and does not think however quit. Follow-up 6 months    Patient should call the office immediately with new or ongoing signs or symptoms or worsening, or proceed to the emergency room. No changes in past medical history, past surgical history, social history, or family history were noted during the patient encounter unless specifically listed above. All updates of past medical history, past surgical history, social history, or family history were reviewed personally by me during the office visit. All problems listed in the assessment are stable unless noted otherwise. Medication profile reviewed personally by me during the visit. Medication side effects and possible impairments from medications were discussed as applicable.     This document was prepared by a combination of typing and transcription through a voice recognition software. Scribe attestation: Marine Pederson RN, am scribing for and in the presence of Christopher Skelton MD. Electronically signed by Sherri Jackson RN on 1/14/2020 at 7:56 AM      Provider attestation:     I, Dr. Rubi Xavier, personally performed the services described in this documentation, as scribed by the above signed scribe in my presence, and it is both accurate and complete. I agree with the ROS and Past Histories independently gathered by the clinical support staff and the remaining scribed note accurately describes my personal service to the patient.       1/14/2020    8:14 AM

## 2020-01-14 NOTE — PATIENT INSTRUCTIONS
Patient should call the office immediately with new or ongoing signs or symptoms or worsening, or proceed to the emergency room. If you are on medications which could impair your senses, you are at risk of weakness, falls, dizziness, or drowsiness. You should be careful during activities which could place you at risk of harm, such as climbing, using stairs, operating machinery, or driving vehicles. If you feel you cannot safely do these activities, you should request others to help you, or avoid the activities altogether. If you are drowsy for any other reason, you should use the same precautions as listed above. You may receive a survey regarding the care you received during your visit. Your input is valuable to us. We encourage you to complete and return your survey. What is lung cancer screening? Lung cancer screening is a way in which doctors check the lungs for early signs of cancer in people who have no symptoms of lung cancer. A low-dose CT scan uses much less radiation than a normal CT scan and shows a more detailed image of the lungs than a standard X-ray. The goal of lung cancer screening is to find cancer early, before it has a chance to grow, spread, or cause problems. One large study found that smokers who were screened with low-dose CT scans were less likely to die of lung cancer than those who were screened with standard X-ray. Below is a summary of the things you need to know regarding screening for lung cancer with low-dose computed tomography (LDCT). This is a screening program that involves routine annual screening with LDCT studies of the lung. The LDCTs are done using low-dose radiation that is not thought to increase your cancer risk. If you have other serious medical conditions (other cancers, congestive heart failure) that limit your life expectancy to less than 10 years, you should not undergo lung cancer screening with LDCT.   The chance is 20%-60% that the LDCT result will show abnormalities. This would require additional testing which could include repeat imaging or even invasive procedures. Most (about 95%) of \"abnormal\" LDCT results are false in the sense that no lung cancer is ultimately found. Additionally, some (about 10%) of the cancers found would not affect your life expectancy, even if undetected and untreated. If you are still smoking, the single most important thing that you can do to reduce your risk of dying of lung cancer is to quit. For this screening to be covered by Medicare and most other insurers, strict criteria must be met. If you do not meet these criteria, but still wish to undergo LDCT testing, you will be required to sign a waiver indicating your willingness to pay for the scan.

## 2020-01-16 RX ORDER — LISINOPRIL 20 MG/1
20 TABLET ORAL DAILY
Qty: 90 TABLET | Refills: 3 | Status: SHIPPED | OUTPATIENT
Start: 2020-01-16 | End: 2021-01-04

## 2020-06-15 RX ORDER — CLOPIDOGREL BISULFATE 75 MG/1
TABLET ORAL
Qty: 90 TABLET | Refills: 3 | Status: SHIPPED | OUTPATIENT
Start: 2020-06-15 | End: 2021-06-11

## 2020-07-15 ENCOUNTER — OFFICE VISIT (OUTPATIENT)
Dept: FAMILY MEDICINE CLINIC | Age: 75
End: 2020-07-15
Payer: COMMERCIAL

## 2020-07-15 VITALS
WEIGHT: 165 LBS | SYSTOLIC BLOOD PRESSURE: 124 MMHG | TEMPERATURE: 97 F | DIASTOLIC BLOOD PRESSURE: 70 MMHG | BODY MASS INDEX: 25.84 KG/M2 | HEART RATE: 70 BPM | OXYGEN SATURATION: 97 %

## 2020-07-15 PROCEDURE — 36415 COLL VENOUS BLD VENIPUNCTURE: CPT | Performed by: FAMILY MEDICINE

## 2020-07-15 PROCEDURE — 99214 OFFICE O/P EST MOD 30 MIN: CPT | Performed by: FAMILY MEDICINE

## 2020-07-15 RX ORDER — AMLODIPINE BESYLATE 10 MG/1
TABLET ORAL
Qty: 90 TABLET | Refills: 3 | Status: SHIPPED | OUTPATIENT
Start: 2020-07-15 | End: 2021-07-15

## 2020-07-15 ASSESSMENT — PATIENT HEALTH QUESTIONNAIRE - PHQ9
1. LITTLE INTEREST OR PLEASURE IN DOING THINGS: 0
SUM OF ALL RESPONSES TO PHQ QUESTIONS 1-9: 0
SUM OF ALL RESPONSES TO PHQ9 QUESTIONS 1 & 2: 0
SUM OF ALL RESPONSES TO PHQ QUESTIONS 1-9: 0
2. FEELING DOWN, DEPRESSED OR HOPELESS: 0

## 2020-07-15 NOTE — PROGRESS NOTES
Chief Complaint   Patient presents with    Hypertension    Hyperlipidemia     fasting, black coffee       HPI:  Ramone Tsai is a 69 y.o. (: 1945) here today for hypertension. Takes his medication. Denies any chest pain or trouble breathing. 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 Feels good. He is here for his cholesterol. He tolerates the medication. Not necessarily watching a diet. Does work outside. No change in exercise. Patient continues to smoke knows he should not and he is aware of the risk. At the last visit he had agreed to go to GI for colon cancer screening and to have a CT lung screen. Today he states that he does not want to know if he has cancer. His sister who had bladder cancer  a few months ago. Patient's medications, allergies, past medical, surgical, social and family histories were reviewed and updated asappropriate on 7/15/2020 at 8:51 AM.    ROS:  Review of Systems    All other systems reviewed and are negative except as noted above on 7/15/2020 at 8:51 AM. Additional review of systems may be scanned into the media section ofthis medical record. Any responses requiring further intervention were pursued. LDL Calculated (mg/dL)   Date Value   2020 66     Past Medical History:   Diagnosis Date    CAD (coronary artery disease)     Carotid bruit     ED (erectile dysfunction)     Hyperlipidemia     Hypertension     Tobacco abuse       History reviewed. No pertinent family history. Social History     Socioeconomic History    Marital status:       Spouse name: Not on file    Number of children: Not on file    Years of education: Not on file    Highest education level: Not on file   Occupational History    Not on file   Social Needs    Financial resource strain: Not on file    Food insecurity     Worry: Not on file     Inability: Not on file    Transportation needs     Medical: Not on file     Non-medical: Not on file   Tobacco Use    Smoking status: Current Every Day Smoker     Packs/day: 2.00     Years: 60.00     Pack years: 120.00     Start date: 6/18/1957    Smokeless tobacco: Never Used   Substance and Sexual Activity    Alcohol use: No     Alcohol/week: 0.0 standard drinks    Drug use: No    Sexual activity: Yes     Partners: Female   Lifestyle    Physical activity     Days per week: Not on file     Minutes per session: Not on file    Stress: Not on file   Relationships    Social connections     Talks on phone: Not on file     Gets together: Not on file     Attends Restorationism service: Not on file     Active member of club or organization: Not on file     Attends meetings of clubs or organizations: Not on file     Relationship status: Not on file    Intimate partner violence     Fear of current or ex partner: Not on file     Emotionally abused: Not on file     Physically abused: Not on file     Forced sexual activity: Not on file   Other Topics Concern    Not on file   Social History Narrative    Not on file       Prior to Visit Medications    Medication Sig Taking?  Authorizing Provider   amLODIPine (NORVASC) 10 MG tablet TAKE 1 TABLET BY MOUTH EVERY DAY Yes Nancy Tijerina MD   clopidogrel (PLAVIX) 75 MG tablet TAKE 1 TABLET BY MOUTH EVERY DAY Yes Nancy Tijerina MD   lisinopril (PRINIVIL;ZESTRIL) 20 MG tablet TAKE 1 TABLET BY MOUTH DAILY Yes Nancy Tijerina MD   simvastatin (ZOCOR) 40 MG tablet TAKE 1 TABLET BY MOUTH EVERY DAY AT NIGHT Yes Nancy Tijerina MD   nitroGLYCERIN (NITROSTAT) 0.4 MG SL tablet Place 1 tablet under the tongue every 5 minutes as needed for Chest pain Yes Nancy Tijerina MD   tadalafil (CIALIS) 20 MG tablet Take 1 tablet by mouth as needed for Erectile Dysfunction Yes Nancy Tijerina MD   [facility] VIT D 5000 HIGH-POTENCY 5000 UNITS CAPS capsule TAKE ONE CAPSULE BY MOUTH DAILY Yes Nancy Tijerina MD   [place] 3 1000 MG CAPS Take by mouth daily Yes Historical Provider, MD   aspirin 81 MG EC tablet Take 81 mg by mouth daily. Yes Historical Provider, MD     Allergies   Allergen Reactions    Ampicillin        OBJECTIVE:  Estimated body mass index is 25.84 kg/m² as calculated from the following:    Height as of 1/14/20: 5' 7\" (1.702 m). Weight as of this encounter: 165 lb (74.8 kg). Vitals:    07/15/20 0806   BP: 124/70   Pulse: 70   Temp: 97 °F (36.1 °C)   SpO2: 97%   Weight: 165 lb (74.8 kg)     BP Readings from Last 2 Encounters:   07/15/20 124/70   01/14/20 118/70     Wt Readings from Last 3 Encounters:   07/15/20 165 lb (74.8 kg)   01/14/20 166 lb (75.3 kg)   07/09/19 162 lb 6.4 oz (73.7 kg)       Physical Exam  Vitals signs and nursing note reviewed. Constitutional:       General: He is not in acute distress. Appearance: He is well-developed. He is not diaphoretic. HENT:      Head: Normocephalic and atraumatic. Right Ear: External ear normal.      Left Ear: External ear normal.      Nose: Nose normal.   Eyes:      General: Lids are normal. No scleral icterus. Right eye: No discharge. Left eye: No discharge. Pupils: Pupils are equal, round, and reactive to light. Neck:      Thyroid: No thyromegaly. Vascular: No JVD. Cardiovascular:      Rate and Rhythm: Normal rate and regular rhythm. Heart sounds: Normal heart sounds. Pulmonary:      Effort: Pulmonary effort is normal. No respiratory distress. Breath sounds: Normal breath sounds. Abdominal:      Palpations: Abdomen is soft. There is no hepatomegaly or splenomegaly. Tenderness: There is no abdominal tenderness. Skin:     General: Skin is warm and dry. Coloration: Skin is not pale. Findings: No erythema or rash. Comments: Turgor normal   Psychiatric:         Behavior: Behavior normal.         Thought Content: Thought content normal.         Judgment: Judgment normal.              ASSESSMENT PLAN      Diagnosis Orders   1. Essential hypertension, benign     2. Prostate cancer screening  PSA, TOTAL AND FREE   3. Vitamin D deficiency     4. Tobacco abuse     5. Positive FIT (fecal immunochemical test)     6. Mixed hyperlipidemia     7. Coronary artery disease due to lipid rich plaque     Patient's blood pressures acceptable. Vitamin D levels will be monitored as needed. Continue lipid monitoring as needed. Tobacco abuse - Pt will not quit despite understanding of risks of continued tobacco use, including cancer, heart attacks, strokes or death. No symptoms of coronary insufficiency. I stressed the need again for cancer screening and interestingly he is agreeable to have the PSA done  But still uncertain about the CT of the lung and colon cancer screening. I am not sure what the patient will decide to do. Although up in office 6 months which is his preference     Patient should call the office immediately with new or ongoing signs or symptoms or worsening, or proceed to the emergency room. No changes in past medical history, past surgical history, social history, or family history were noted during the patient encounter unless specifically listed above. All updates of past medical history, past surgical history, social history, or family history were reviewed personally by me during the office visit. All problems listed in the assessment are stable unless noted otherwise. Medication profile reviewed personally by me during the visit. Medication side effects and possible impairments from medications were discussed as applicable. This document was prepared by a combination of typing and transcription through a voice recognition software.

## 2020-07-20 LAB
PROSTATE SPECIFIC ANTIGEN FREE: 1.5 UG/L
PROSTATE SPECIFIC ANTIGEN PERCENT FREE: 34.1 %
PROSTATE SPECIFIC ANTIGEN: 4.4 UG/L (ref 0–4)

## 2020-07-21 ENCOUNTER — TELEPHONE (OUTPATIENT)
Dept: FAMILY MEDICINE CLINIC | Age: 75
End: 2020-07-21

## 2020-07-21 NOTE — TELEPHONE ENCOUNTER
----- Message from [place], [place] sent at 7/21/2020 10:06 AM EDT -----  Subject: Message to Provider    QUESTIONS  Information for Provider? DR. Merlin Ramos @ the [facility] has retired and Pt   needs another referral sent to the urology group. Please fax the referral   to [phone].  ---------------------------------------------------------------------------  --------------  CALL BACK INFO  What is the best way for the office to contact you? OK to leave message on   voicemail  Preferred Call Back Phone Number? [phone]  ---------------------------------------------------------------------------  --------------  SCRIPT ANSWERS  Relationship to Patient?  Self

## 2020-07-21 NOTE — TELEPHONE ENCOUNTER
----- Message from Kalli Diana, [address] [place] sent at 7/21/2020 10:06 AM EDT -----  Subject: Message to Provider    QUESTIONS  Information for Provider? DR. Shellie Olivera @ the [facility] group has retired and Pt   needs another referral sent to the urology group. Please fax the referral   to [phone].  ---------------------------------------------------------------------------  --------------  CALL BACK INFO  What is the best way for the office to contact you? OK to leave message on   voicemail  Preferred Call Back Phone Number? [phone]  ---------------------------------------------------------------------------  --------------  SCRIPT ANSWERS  Relationship to Patient?  Self

## 2020-07-21 NOTE — TELEPHONE ENCOUNTER
----- Message from Clayton Simental, [address] [place] sent at 7/21/2020 10:06 AM EDT -----  Subject: Message to Provider    QUESTIONS  Information for Provider? DR. Rosalind Schwarz @ the [facility] has retired and Pt   needs another referral sent to the urology group. Please fax the referral   to [phone].  ---------------------------------------------------------------------------  --------------  CALL BACK INFO  What is the best way for the office to contact you? OK to leave message on   voicemail  Preferred Call Back Phone Number? [phone]  ---------------------------------------------------------------------------  --------------  SCRIPT ANSWERS  Relationship to Patient?  Self

## 2020-07-21 NOTE — TELEPHONE ENCOUNTER
----- Message from Toya Mckeon, [address] [place] sent at 7/21/2020 10:06 AM EDT -----  Subject: Message to Provider    QUESTIONS  Information for Provider? DR. Kecia Cristina @ the [facility] has retired and Pt   needs another referral sent to the urology group. Please fax the referral   to [phone].  ---------------------------------------------------------------------------  --------------  CALL BACK INFO  What is the best way for the office to contact you? OK to leave message on   voicemail  Preferred Call Back Phone Number? [phone]  ---------------------------------------------------------------------------  --------------  SCRIPT ANSWERS  Relationship to Patient?  Self

## 2020-09-14 RX ORDER — SIMVASTATIN 40 MG
TABLET ORAL
Qty: 90 TABLET | Refills: 3 | Status: SHIPPED | OUTPATIENT
Start: 2020-09-14 | End: 2021-09-13

## 2020-10-08 RX ORDER — LISINOPRIL 10 MG/1
TABLET ORAL
Qty: 180 TABLET | Refills: 1 | Status: SHIPPED | OUTPATIENT
Start: 2020-10-08 | End: 2021-01-04

## 2021-01-04 DIAGNOSIS — T78.3XXA ANGIOEDEMA DUE TO ANGIOTENSIN CONVERTING ENZYME INHIBITOR (ACE-I): ICD-10-CM

## 2021-01-04 DIAGNOSIS — T46.4X5A ANGIOEDEMA DUE TO ANGIOTENSIN CONVERTING ENZYME INHIBITOR (ACE-I): ICD-10-CM

## 2021-01-04 NOTE — PROGRESS NOTES
Received a call from [facility] emergency room. Patient had presented there on Luca 3 with tongue swelling. Felt to be related to lisinopril. That medication has been discontinued. Blood pressure acceptable. Doing better today.  Will be discharged and we already have follow-up scheduled on Jan 8

## 2021-01-08 ENCOUNTER — OFFICE VISIT (OUTPATIENT)
Dept: FAMILY MEDICINE CLINIC | Age: 76
End: 2021-01-08
Payer: COMMERCIAL

## 2021-01-08 VITALS
BODY MASS INDEX: 26.16 KG/M2 | OXYGEN SATURATION: 96 % | SYSTOLIC BLOOD PRESSURE: 118 MMHG | HEART RATE: 83 BPM | TEMPERATURE: 96.7 F | DIASTOLIC BLOOD PRESSURE: 72 MMHG | WEIGHT: 167 LBS

## 2021-01-08 DIAGNOSIS — T78.3XXA ANGIOEDEMA DUE TO ANGIOTENSIN CONVERTING ENZYME INHIBITOR (ACE-I): ICD-10-CM

## 2021-01-08 DIAGNOSIS — I25.10 CORONARY ARTERY DISEASE DUE TO LIPID RICH PLAQUE: ICD-10-CM

## 2021-01-08 DIAGNOSIS — Z87.891 PERSONAL HISTORY OF TOBACCO USE: ICD-10-CM

## 2021-01-08 DIAGNOSIS — Z72.0 TOBACCO USE: ICD-10-CM

## 2021-01-08 DIAGNOSIS — Z12.11 SCREENING FOR COLON CANCER: ICD-10-CM

## 2021-01-08 DIAGNOSIS — T46.4X5A ANGIOEDEMA DUE TO ANGIOTENSIN CONVERTING ENZYME INHIBITOR (ACE-I): ICD-10-CM

## 2021-01-08 DIAGNOSIS — I10 ESSENTIAL HYPERTENSION, BENIGN: ICD-10-CM

## 2021-01-08 DIAGNOSIS — Z72.0 TOBACCO ABUSE: ICD-10-CM

## 2021-01-08 DIAGNOSIS — Z23 FLU VACCINE NEED: ICD-10-CM

## 2021-01-08 DIAGNOSIS — I25.83 CORONARY ARTERY DISEASE DUE TO LIPID RICH PLAQUE: ICD-10-CM

## 2021-01-08 DIAGNOSIS — E78.2 MIXED HYPERLIPIDEMIA: ICD-10-CM

## 2021-01-08 DIAGNOSIS — E55.9 VITAMIN D DEFICIENCY: ICD-10-CM

## 2021-01-08 PROCEDURE — G0296 VISIT TO DETERM LDCT ELIG: HCPCS | Performed by: FAMILY MEDICINE

## 2021-01-08 PROCEDURE — 99214 OFFICE O/P EST MOD 30 MIN: CPT | Performed by: FAMILY MEDICINE

## 2021-01-08 ASSESSMENT — PATIENT HEALTH QUESTIONNAIRE - PHQ9
SUM OF ALL RESPONSES TO PHQ QUESTIONS 1-9: 0
SUM OF ALL RESPONSES TO PHQ9 QUESTIONS 1 & 2: 0
SUM OF ALL RESPONSES TO PHQ QUESTIONS 1-9: 0

## 2021-01-08 NOTE — PATIENT INSTRUCTIONS
Patient should call the office immediately with new or ongoing signs or symptoms or worsening, or proceed to the emergency room. If you are on medications which could impair your senses, you are at risk of weakness, falls, dizziness, or drowsiness. You should be careful during activities which could place you at risk of harm, such as climbing, using stairs, operating machinery, or driving vehicles. If you feel you cannot safely do these activities, you should request others to help you, or avoid the activities altogether. If you are drowsy for any other reason, you should use the same precautions as listed above. Web Address for Advance Directive:    [email or web address]     What is lung cancer screening? Lung cancer screening is a way in which doctors check the lungs for early signs of cancer in people who have no symptoms of lung cancer. A low-dose CT scan uses much less radiation than a normal CT scan and shows a more detailed image of the lungs than a standard X-ray. The goal of lung cancer screening is to find cancer early, before it has a chance to grow, spread, or cause problems. One large study found that smokers who were screened with low-dose CT scans were less likely to die of lung cancer than those who were screened with standard X-ray. Below is a summary of the things you need to know regarding screening for lung cancer with low-dose computed tomography (LDCT). This is a screening program that involves routine annual screening with LDCT studies of the lung. The LDCTs are done using low-dose radiation that is not thought to increase your cancer risk. If you have other serious medical conditions (other cancers, congestive heart failure) that limit your life expectancy to less than 10 years, you should not undergo lung cancer screening with LDCT. The chance is 20%-60% that the LDCT result will show abnormalities. This would require additional testing which could include repeat imaging or even invasive procedures. Most (about 95%) of \"abnormal\" LDCT results are false in the sense that no lung cancer is ultimately found. Additionally, some (about 10%) of the cancers found would not affect your life expectancy, even if undetected and untreated. If you are still smoking, the single most important thing that you can do to reduce your risk of dying of lung cancer is to quit. For this screening to be covered by Medicare and most other insurers, strict criteria must be met. If you do not meet these criteria, but still wish to undergo LDCT testing, you will be required to sign a waiver indicating your willingness to pay for the scan.

## 2021-01-08 NOTE — PROGRESS NOTES
Chief Complaint   Patient presents with    Hypertension     pt was taken off the lisinopril     Hyperlipidemia     pt is fasting for blood work    Coronary Artery Disease     Patient on  night developed a very swollen tongue, went to [facility], they determined that the lisinopril caused this. He is no longer on the lisinopril. BP was good this morning despite stopping the lisinopril. Discussed the need to keep in his wallet that he is allergic to lisinopril and that it caused a high reaction of tongue swelling. Patient is willing to complete a CT screen for lung cancer. Patient has always had a dry cough, will see in the coming months if the cough was another effect of the lisinopril or if it is due to his smoking. Discussed again with patient the risks of smoking. Patient does not plan to ever stop smoking. HPI:  Geovanna Osei is a 77 y.o. (: 1945) here today for  Treatment Adherence:   Medication compliance:  compliant all of the time  Diet compliance:  compliant most of the time  Weight trend: stable  Current exercise: no regular exercise  Barriers: none    Hypertension:  Home blood pressure monitoring: No. Patient denies chest pain, shortness of breath, blurred vision and palpitations. Antihypertensive medication side effects: no medication side effects noted. Use of agents associated with hypertension: none. Sodium (mmol/L)   Date Value   2020 143    BUN (mg/dL)   Date Value   2020 21 (H)    Glucose (mg/dL)   Date Value   2020 100 (H)      Potassium (mmol/L)   Date Value   2020 4.1    CREATININE (mg/dL)   Date Value   2020 1.4 (H)         Hyperlipidemia:  No new myalgias or GI upset on simvastatin (Zocor).      Lab Results   Component Value Date    CHOL 124 2020    TRIG 140 2020    HDL 30 (L) 2020    LDLCALC 66 2020     Lab Results   Component Value Date    ALT 14 2020 AST 18 01/14/2020          Patient's medications, allergies, past medical, surgical, social and family histories were reviewed and updated asappropriate on 1/8/2021 at 7:49 AM.    ROS:  Review of Systems    All other systems reviewed and are negative except as noted above on 1/8/2021 at 7:49 AM. Additional review of systems may be scanned into the media section ofthis medical record. Any responses requiring further intervention were pursued. LDL Calculated (mg/dL)   Date Value   01/14/2020 66     Past Medical History:   Diagnosis Date    CAD (coronary artery disease)     Carotid bruit     ED (erectile dysfunction)     Hyperlipidemia     Hypertension     Tobacco abuse      No family history on file. Social History     Socioeconomic History    Marital status:       Spouse name: Not on file    Number of children: Not on file    Years of education: Not on file    Highest education level: Not on file   Occupational History    Not on file   Social Needs    Financial resource strain: Not on file    Food insecurity     Worry: Not on file     Inability: Not on file    Transportation needs     Medical: Not on file     Non-medical: Not on file   Tobacco Use    Smoking status: Current Every Day Smoker     Packs/day: 2.00     Years: 60.00     Pack years: 120.00     Start date: 6/18/1957    Smokeless tobacco: Never Used   Substance and Sexual Activity    Alcohol use: No     Alcohol/week: 0.0 standard drinks    Drug use: No    Sexual activity: Yes     Partners: Female   Lifestyle    Physical activity     Days per week: Not on file     Minutes per session: Not on file    Stress: Not on file   Relationships    Social connections     Talks on phone: Not on file     Gets together: Not on file     Attends Rastafari service: Not on file     Active member of club or organization: Not on file     Attends meetings of clubs or organizations: Not on file     Relationship status: Not on file Vitals signs and nursing note reviewed. Constitutional:       General: He is not in acute distress. Appearance: He is well-developed. He is not diaphoretic. HENT:      Head: Normocephalic and atraumatic. Right Ear: External ear normal.      Left Ear: External ear normal.      Nose: Nose normal.   Eyes:      General: Lids are normal. No scleral icterus. Right eye: No discharge. Left eye: No discharge. Pupils: Pupils are equal, round, and reactive to light. Neck:      Thyroid: No thyromegaly. Vascular: No JVD. Cardiovascular:      Rate and Rhythm: Normal rate and regular rhythm. Heart sounds: Normal heart sounds. Pulmonary:      Effort: Pulmonary effort is normal. No respiratory distress. Breath sounds: Normal breath sounds. Abdominal:      Palpations: Abdomen is soft. There is no hepatomegaly or splenomegaly. Tenderness: There is no abdominal tenderness. Musculoskeletal:      Right lower leg: No edema. Left lower leg: No edema. Skin:     General: Skin is warm and dry. Coloration: Skin is not pale. Findings: No erythema or rash. Comments: Turgor normal   Psychiatric:         Behavior: Behavior normal.         Thought Content: Thought content normal.         Judgment: Judgment normal.              ASSESSMENT PLAN      Diagnosis Orders   1. Mixed hyperlipidemia  COMPREHENSIVE METABOLIC PANEL    CBC WITH AUTO DIFFERENTIAL    LIPID PANEL   2. Vitamin D deficiency  VITAMIN D 25 HYDROXY   3. Essential hypertension, benign     4. Flu vaccine need     5. Screening for colon cancer  POCT Fecal Immunochemical Test (FIT)   6. Tobacco use  CT Lung Screen (Initial or Annual)   7. Personal history of tobacco use  OK VISIT TO DISCUSS LUNG CA SCREEN W LDCT   8. Angioedema due to angiotensin converting enzyme inhibitor (ACE-I)     9. Tobacco abuse     10.  Coronary artery disease due to lipid rich plaque No further issues with the tongue swelling, will avoid ACE inhibitors and related medications and that future. Blood pressure currently acceptable no substitution made, asked patient to check blood pressure every couple months. Continue lipid monitoring as needed. Vitamin D levels will be monitored as needed FIT card to be rechecked, there was one positive in the past but patient had refused to have any further work-up. Agreed to CT lung screening. Tobacco abuse - Pt will not quit despite understanding of risks of continued tobacco use, including cancer, heart attacks, strokes or death. No symptoms of coronary insufficiency. Follow-up 6 months. Patient should call the office immediately with new or ongoing signs or symptoms or worsening, or proceed to the emergency room. No changes in past medical history, past surgical history, social history, or family history were noted during the patient encounter unless specifically listed above. All updates of past medical history, past surgical history, social history, or family history were reviewed personally by me during the office visit. All problems listed in the assessment are stable unless noted otherwise. Medication profile reviewed personally by me during the visit. Medication side effects and possible impairments from medications were discussed as applicable. This document was prepared by a combination of typing and transcription through a voice recognition software.            Scribe attestation: Olena Lui RN, am scribing for and in the presence of Uli Londono MD. Electronically signed by La Zuluaga RN on 1/8/2021 at 7:49 AM      Provider attestation: I, Dr. Chas Agarwal, personally performed the services described in this documentation, as scribed by the above signed scribe in my presence, and it is both accurate and complete. I agree with the ROS and Past Histories independently gathered by the clinical support staff and the remaining scribed note accurately describes my personal service to the patient.       1/8/2021    8:29 AM

## 2021-01-11 DIAGNOSIS — E55.9 VITAMIN D DEFICIENCY: ICD-10-CM

## 2021-01-11 DIAGNOSIS — Z12.5 PROSTATE CANCER SCREENING: ICD-10-CM

## 2021-01-11 DIAGNOSIS — E78.2 MIXED HYPERLIPIDEMIA: Primary | ICD-10-CM

## 2021-01-11 DIAGNOSIS — I10 ESSENTIAL HYPERTENSION, BENIGN: ICD-10-CM

## 2021-03-15 ENCOUNTER — TELEPHONE (OUTPATIENT)
Dept: PHARMACY | Facility: CLINIC | Age: 76
End: 2021-03-15

## 2021-03-15 NOTE — TELEPHONE ENCOUNTER
[facility] CLINICAL PHARMACY: STATIN THERAPY REVIEW  Identified statin use in persons with cardiovascular disease care gap per [employer]. Records dated 2/12/21. Last Office Visit: 1/8/21    Patient not found in Outcomes MTM    ASSESSMENT:  Patient has been identified as having a diagnosis for clinical ASCVD or event (e.g., inpatient hospitalization for MI, CABG, PCI or other revascularization procedures) in the measurement year and not currently filling a moderate or high intensity statin. Patients included in this care gap are males age 18-72 and females age 43-69. Per chart review, patient is prescribed simvastatin 40 mg daily. Per Reconciled Dispense Report   Simvastatin last filled on 12/16/20 for a 90 day supply. Due to refill 3/16/21. 2 refills remaining. Will route to clinical pharmacy  to outreach to pharmacy to confirm most recent refill data as well as prescription, prescriber and insurance information.      Arturo Thomas, PharmD, [facility]  Direct: [phone]  [facility], toll free [phone], option 7

## 2021-03-16 NOTE — TELEPHONE ENCOUNTER
Called and spoke with patient. Informed him that simvastatin was ready for pick-up at [facility] pharmacy. Patient thankful for the call and will pick this medication up. Will sign off at this time.      Cristina Doran, PharmD, [facility]  Direct: [phone]  Department, toll free [phone], option 7          For Pharmacy Admin Tracking Only    PHSO: Yes  Total # of Interventions Recommended: 1  - New Order #: 0 New Medication Order Reason(s): Needs Additional Medication Therapy  - Maintenance Safety Lab Monitoring #: 1  Recommended intervention potential cost savings: 1  Total Interventions Accepted: 1  Time Spent (min): 15

## 2021-03-16 NOTE — TELEPHONE ENCOUNTER
Per [facility], Simvastatin last picked up on 12/18/21 for a 90 day supply, Refills remaining 1, waiting to be picked up, billed through Medicare D and prescribed by Marc Hernandez

## 2021-06-11 DIAGNOSIS — I25.83 CORONARY ARTERY DISEASE DUE TO LIPID RICH PLAQUE: ICD-10-CM

## 2021-06-11 DIAGNOSIS — I25.10 CORONARY ARTERY DISEASE DUE TO LIPID RICH PLAQUE: ICD-10-CM

## 2021-06-11 RX ORDER — CLOPIDOGREL BISULFATE 75 MG/1
TABLET ORAL
Qty: 90 TABLET | Refills: 3 | Status: SHIPPED | OUTPATIENT
Start: 2021-06-11 | End: 2022-06-30 | Stop reason: SDUPTHER

## 2021-07-07 ENCOUNTER — NURSE ONLY (OUTPATIENT)
Dept: FAMILY MEDICINE CLINIC | Age: 76
End: 2021-07-07
Payer: COMMERCIAL

## 2021-07-07 DIAGNOSIS — Z12.5 PROSTATE CANCER SCREENING: ICD-10-CM

## 2021-07-07 DIAGNOSIS — E78.2 MIXED HYPERLIPIDEMIA: ICD-10-CM

## 2021-07-07 DIAGNOSIS — E55.9 VITAMIN D DEFICIENCY: ICD-10-CM

## 2021-07-07 DIAGNOSIS — I10 ESSENTIAL HYPERTENSION, BENIGN: ICD-10-CM

## 2021-07-07 LAB
A/G RATIO: 1.7 (ref 1.1–2.2)
ALBUMIN SERPL-MCNC: 4.9 G/DL (ref 3.4–5)
ALP BLD-CCNC: 109 U/L (ref 40–129)
ALT SERPL-CCNC: 16 U/L (ref 10–40)
ANION GAP SERPL CALCULATED.3IONS-SCNC: 13 MMOL/L (ref 3–16)
AST SERPL-CCNC: 21 U/L (ref 15–37)
BASOPHILS ABSOLUTE: 0 K/UL (ref 0–0.2)
BASOPHILS RELATIVE PERCENT: 1 %
BILIRUB SERPL-MCNC: 0.9 MG/DL (ref 0–1)
BUN BLDV-MCNC: 18 MG/DL (ref 7–20)
CALCIUM SERPL-MCNC: 9.9 MG/DL (ref 8.3–10.6)
CHLORIDE BLD-SCNC: 102 MMOL/L (ref 99–110)
CHOLESTEROL, TOTAL: 132 MG/DL (ref 0–199)
CO2: 26 MMOL/L (ref 21–32)
CREAT SERPL-MCNC: 1.5 MG/DL (ref 0.8–1.3)
EOSINOPHILS ABSOLUTE: 0.3 K/UL (ref 0–0.6)
EOSINOPHILS RELATIVE PERCENT: 6.9 %
GFR AFRICAN AMERICAN: 55
GFR NON-AFRICAN AMERICAN: 46
GLOBULIN: 2.9 G/DL
GLUCOSE BLD-MCNC: 102 MG/DL (ref 70–99)
HCT VFR BLD CALC: 44.6 % (ref 40.5–52.5)
HDLC SERPL-MCNC: 28 MG/DL (ref 40–60)
HEMOGLOBIN: 15 G/DL (ref 13.5–17.5)
LDL CHOLESTEROL CALCULATED: 77 MG/DL
LYMPHOCYTES ABSOLUTE: 0.8 K/UL (ref 1–5.1)
LYMPHOCYTES RELATIVE PERCENT: 17.3 %
MCH RBC QN AUTO: 32.2 PG (ref 26–34)
MCHC RBC AUTO-ENTMCNC: 33.7 G/DL (ref 31–36)
MCV RBC AUTO: 95.6 FL (ref 80–100)
MONOCYTES ABSOLUTE: 0.5 K/UL (ref 0–1.3)
MONOCYTES RELATIVE PERCENT: 9.6 %
NEUTROPHILS ABSOLUTE: 3.1 K/UL (ref 1.7–7.7)
NEUTROPHILS RELATIVE PERCENT: 65.2 %
PDW BLD-RTO: 15.7 % (ref 12.4–15.4)
PLATELET # BLD: 114 K/UL (ref 135–450)
PMV BLD AUTO: 9.9 FL (ref 5–10.5)
POTASSIUM SERPL-SCNC: 4.4 MMOL/L (ref 3.5–5.1)
PROSTATE SPECIFIC ANTIGEN: 4.34 NG/ML (ref 0–4)
RBC # BLD: 4.67 M/UL (ref 4.2–5.9)
SODIUM BLD-SCNC: 141 MMOL/L (ref 136–145)
TOTAL PROTEIN: 7.8 G/DL (ref 6.4–8.2)
TRIGL SERPL-MCNC: 136 MG/DL (ref 0–150)
VITAMIN D 25-HYDROXY: 111.9 NG/ML
VLDLC SERPL CALC-MCNC: 27 MG/DL
WBC # BLD: 4.8 K/UL (ref 4–11)

## 2021-07-07 PROCEDURE — 36415 COLL VENOUS BLD VENIPUNCTURE: CPT | Performed by: FAMILY MEDICINE

## 2021-07-09 ENCOUNTER — OFFICE VISIT (OUTPATIENT)
Dept: FAMILY MEDICINE CLINIC | Age: 76
End: 2021-07-09
Payer: COMMERCIAL

## 2021-07-09 VITALS
DIASTOLIC BLOOD PRESSURE: 64 MMHG | OXYGEN SATURATION: 98 % | BODY MASS INDEX: 25.74 KG/M2 | HEIGHT: 67 IN | HEART RATE: 71 BPM | SYSTOLIC BLOOD PRESSURE: 130 MMHG | WEIGHT: 164 LBS

## 2021-07-09 DIAGNOSIS — E55.9 VITAMIN D DEFICIENCY: ICD-10-CM

## 2021-07-09 DIAGNOSIS — N18.30 STAGE 3 CHRONIC KIDNEY DISEASE, UNSPECIFIED WHETHER STAGE 3A OR 3B CKD (HCC): ICD-10-CM

## 2021-07-09 DIAGNOSIS — I25.83 CORONARY ARTERY DISEASE DUE TO LIPID RICH PLAQUE: ICD-10-CM

## 2021-07-09 DIAGNOSIS — I10 ESSENTIAL HYPERTENSION, BENIGN: Primary | ICD-10-CM

## 2021-07-09 DIAGNOSIS — Z72.0 TOBACCO ABUSE: ICD-10-CM

## 2021-07-09 DIAGNOSIS — I25.10 CORONARY ARTERY DISEASE DUE TO LIPID RICH PLAQUE: ICD-10-CM

## 2021-07-09 DIAGNOSIS — R97.20 ELEVATED PSA: ICD-10-CM

## 2021-07-09 DIAGNOSIS — D69.6 ACQUIRED THROMBOCYTOPENIA (HCC): ICD-10-CM

## 2021-07-09 DIAGNOSIS — Z12.11 SCREENING FOR COLON CANCER: ICD-10-CM

## 2021-07-09 DIAGNOSIS — E78.2 MIXED HYPERLIPIDEMIA: ICD-10-CM

## 2021-07-09 PROCEDURE — 99214 OFFICE O/P EST MOD 30 MIN: CPT | Performed by: FAMILY MEDICINE

## 2021-07-09 SDOH — ECONOMIC STABILITY: FOOD INSECURITY: WITHIN THE PAST 12 MONTHS, THE FOOD YOU BOUGHT JUST DIDN'T LAST AND YOU DIDN'T HAVE MONEY TO GET MORE.: NEVER TRUE

## 2021-07-09 SDOH — ECONOMIC STABILITY: FOOD INSECURITY: WITHIN THE PAST 12 MONTHS, YOU WORRIED THAT YOUR FOOD WOULD RUN OUT BEFORE YOU GOT MONEY TO BUY MORE.: NEVER TRUE

## 2021-07-09 ASSESSMENT — SOCIAL DETERMINANTS OF HEALTH (SDOH): HOW HARD IS IT FOR YOU TO PAY FOR THE VERY BASICS LIKE FOOD, HOUSING, MEDICAL CARE, AND HEATING?: NOT HARD AT ALL

## 2021-07-09 NOTE — PATIENT INSTRUCTIONS
Take your vitamin D 5,000 unit supplement every other day. Once this runs out you can start taking 2,000 units of vitamin D daily. Patient should call the office immediately with new or ongoing signs or symptoms or worsening, or proceed to the emergency room. If you are on medications which could impair your senses, you are at risk of weakness, falls, dizziness, or drowsiness. You should be careful during activities which could place you at risk of harm, such as climbing, using stairs, operating machinery, or driving vehicles. If you feel you cannot safely do these activities, you should request others to help you, or avoid the activities altogether. If you are drowsy for any other reason, you should use the same precautions as listed above.      Web Address for Advance Directive:    [email or web address]

## 2021-07-09 NOTE — PROGRESS NOTES
Chief Complaint   Patient presents with    Hyperlipidemia    Hypertension    Coronary Artery Disease    Other     patient has multiple medical complaints        Internal Administration   First Dose      Second Dose           Last COVID Lab No results found for: SARS-COV-2, SARS-COV-2 RNA, SARS-COV-2, SARS-COV-2, SARS-COV-2 BY PCR, SARS-COV-2, SARS-COV-2, SARS-COV-2          Wt Readings from Last 3 Encounters:   21 164 lb (74.4 kg)   21 167 lb (75.8 kg)   07/15/20 165 lb (74.8 kg)     BP Readings from Last 3 Encounters:   21 130/64   21 118/72   07/15/20 124/70      No results found for: LABA1C    HPI:  Verner Chiles is a 77 y.o. (: 1945) here today for     discussed with patient his lab results today in the office. Discussed with patient his elevated PSA he did see dr. Natty Carver for this and he was informed to just follow up with him in one year.     [] Patient has completed an advance directive  [x] Patient has NOT completed an advanced directive  [] Patient has a documented healthcare surrogate  [x] Patient does NOT have a documented healthcare surrogate  [] Discussed the importance of establishing and updating an advanced directive. Patient has questions at this time and those were answered. [x] Discussed the importance of establishing and updating an advanced directive. Patient does NOT have questions at this time. Discussed with: [x] Patient            [] Family             [] Other caregiver    Patient's medications, allergies, past medical, surgical, social and family histories were reviewed and updated asappropriate on 2021 at 8:14 AM.    ROS:  Review of Systems    All other systems reviewed and are negative except as noted above on 2021 at 8:14 AM. Additional review of systems may be scanned into the media section ofthis medical record. Any responses requiring further intervention were pursued.     Past Medical History:   Diagnosis Date    CAD (coronary artery disease)     Carotid bruit     ED (erectile dysfunction)     Hyperlipidemia     Hypertension     Tobacco abuse      History reviewed. No pertinent family history. Social History     Socioeconomic History    Marital status:      Spouse name: Not on file    Number of children: Not on file    Years of education: Not on file    Highest education level: Not on file   Occupational History    Not on file   Tobacco Use    Smoking status: Current Every Day Smoker     Packs/day: 2.00     Years: 60.00     Pack years: 120.00     Start date: 6/18/1957    Smokeless tobacco: Never Used   Vaping Use    Vaping Use: Never used   Substance and Sexual Activity    Alcohol use: No     Alcohol/week: 0.0 standard drinks    Drug use: No    Sexual activity: Yes     Partners: Female   Other Topics Concern    Not on file   Social History Narrative    Not on file     Social Determinants of Health     Financial Resource Strain: Low Risk     Difficulty of Paying Living Expenses: Not hard at all   Food Insecurity: No Food Insecurity    Worried About [address] in the Last Year: Never true    [address] Pricing Assistant in the Last Year: Never true   Transportation Needs:     Lack of Transportation (Medical):  Lack of Transportation (Non-Medical):    Physical Activity:     Days of Exercise per Week:     Minutes of Exercise per Session:    Stress:     Feeling of Stress :    Social Connections:     Frequency of Communication with Friends and Family:     Frequency of Social Gatherings with Friends and Family:     Attends Druze Services:     Active Member of Clubs or Organizations:     Attends Club or Organization Meetings:     Marital Status:    Intimate Partner Violence:     Fear of Current or Ex-Partner:     Emotionally Abused:     Physically Abused:     Sexually Abused:      Prior to Visit Medications    Medication Sig Taking?  Authorizing Provider   clopidogrel (PLAVIX) 75 MG tablet TAKE 1 Heart sounds: Normal heart sounds. Pulmonary:      Effort: Pulmonary effort is normal. No respiratory distress. Breath sounds: Normal breath sounds. Abdominal:      Palpations: Abdomen is soft. There is no hepatomegaly or splenomegaly. Tenderness: There is no abdominal tenderness. Musculoskeletal:      Right lower leg: No edema. Left lower leg: No edema. Skin:     General: Skin is warm and dry. Coloration: Skin is not pale. Findings: No erythema or rash. Comments: Turgor normal   Neurological:      Mental Status: He is oriented to person, place, and time. Psychiatric:         Mood and Affect: Mood normal.         Behavior: Behavior normal.         Thought Content: Thought content normal.         Judgment: Judgment normal.              ASSESSMENT PLAN      Diagnosis Orders   1. Essential hypertension, benign     2. Screening for colon cancer  Cologuard (For External Results Only)   3. Mixed hyperlipidemia     4. Acquired thrombocytopenia (HCC)     5. Stage 3 chronic kidney disease, unspecified whether stage 3a or 3b CKD (HCC)     6. Elevated PSA     7. Vitamin D deficiency     8. Tobacco abuse     9. Coronary artery disease due to lipid rich plaque     Blood pressure acceptable. He will do the Cologuard. Continue lipid monitoring as needed. Mildly low platelet count continue to monitor. Kidney function stable. Sees urology yearly. Vitamin D levels will be monitored as needed no symptoms of coronary insufficiency. Tobacco abuse - Pt will not quit despite understanding of risks of continued tobacco use, including cancer, heart attacks, strokes or death. Follow-up 6 months recheck vitamin D level at that time. We did ask him to change his vitamin D to 5000 every other day or 2000 daily as his level was somewhat elevated. Patient should call the office immediately with new or ongoing signs or symptoms or worsening, or proceed to the emergency room.   No changes in past medical history, past surgical history, social history, or family history were noted during the patient encounter unless specifically listed above. All updates of past medical history, past surgical history, social history, or family history were reviewed personally by me during the office visit. All problems listed in the assessment are stable unless noted otherwise. Medication profile reviewed personally by me during the visit. Medication side effects and possible impairments from medications were discussed as applicable. This document was prepared by a combination of typing and transcription through a voice recognition software. Scribe attestation: Janice Barakat RN, am scribing for and in the presence of Ginny Bach MD. Electronically signed by Aldo Vega RN on 7/9/2021 at 8:14 AM      Provider attestation:     I, Dr. Kingston Zeng, personally performed the services described in this documentation, as scribed by the above signed scribe in my presence, and it is both accurate and complete. I agree with the ROS and Past Histories independently gathered by the clinical support staff and the remaining scribed note accurately describes my personal service to the patient.       7/9/2021    9:38 AM

## 2021-07-15 DIAGNOSIS — I25.10 CORONARY ARTERY DISEASE DUE TO LIPID RICH PLAQUE: ICD-10-CM

## 2021-07-15 DIAGNOSIS — I10 ESSENTIAL HYPERTENSION, BENIGN: ICD-10-CM

## 2021-07-15 DIAGNOSIS — I25.83 CORONARY ARTERY DISEASE DUE TO LIPID RICH PLAQUE: ICD-10-CM

## 2021-07-15 RX ORDER — AMLODIPINE BESYLATE 10 MG/1
TABLET ORAL
Qty: 90 TABLET | Refills: 3 | Status: SHIPPED | OUTPATIENT
Start: 2021-07-15 | End: 2022-06-30 | Stop reason: SDUPTHER

## 2021-07-15 NOTE — TELEPHONE ENCOUNTER
Last OV 7/9/21  Future Appointments   Date Time Provider Tl Cain   1/11/2022  8:20 AM MD Shaheen Meeks [place] [facility]

## 2021-09-13 DIAGNOSIS — E78.5 HYPERLIPIDEMIA, UNSPECIFIED HYPERLIPIDEMIA TYPE: ICD-10-CM

## 2021-09-13 RX ORDER — SIMVASTATIN 40 MG
TABLET ORAL
Qty: 90 TABLET | Refills: 3 | Status: SHIPPED | OUTPATIENT
Start: 2021-09-13 | End: 2022-06-30 | Stop reason: SDUPTHER

## 2022-01-11 ENCOUNTER — VIRTUAL VISIT (OUTPATIENT)
Dept: FAMILY MEDICINE CLINIC | Age: 77
End: 2022-01-11
Payer: COMMERCIAL

## 2022-01-11 DIAGNOSIS — N52.9 ERECTILE DYSFUNCTION, UNSPECIFIED ERECTILE DYSFUNCTION TYPE: ICD-10-CM

## 2022-01-11 DIAGNOSIS — Z72.0 TOBACCO ABUSE: ICD-10-CM

## 2022-01-11 DIAGNOSIS — I25.10 CORONARY ARTERY DISEASE DUE TO LIPID RICH PLAQUE: ICD-10-CM

## 2022-01-11 DIAGNOSIS — E55.9 VITAMIN D DEFICIENCY: ICD-10-CM

## 2022-01-11 DIAGNOSIS — D69.6 ACQUIRED THROMBOCYTOPENIA (HCC): ICD-10-CM

## 2022-01-11 DIAGNOSIS — N18.30 STAGE 3 CHRONIC KIDNEY DISEASE, UNSPECIFIED WHETHER STAGE 3A OR 3B CKD (HCC): ICD-10-CM

## 2022-01-11 DIAGNOSIS — Z12.11 COLON CANCER SCREENING: ICD-10-CM

## 2022-01-11 DIAGNOSIS — I25.83 CORONARY ARTERY DISEASE DUE TO LIPID RICH PLAQUE: ICD-10-CM

## 2022-01-11 DIAGNOSIS — I10 ESSENTIAL HYPERTENSION, BENIGN: Primary | ICD-10-CM

## 2022-01-11 PROCEDURE — 99441 PR PHYS/QHP TELEPHONE EVALUATION 5-10 MIN: CPT | Performed by: FAMILY MEDICINE

## 2022-01-11 ASSESSMENT — PATIENT HEALTH QUESTIONNAIRE - PHQ9
1. LITTLE INTEREST OR PLEASURE IN DOING THINGS: 0
SUM OF ALL RESPONSES TO PHQ QUESTIONS 1-9: 0
SUM OF ALL RESPONSES TO PHQ9 QUESTIONS 1 & 2: 0
2. FEELING DOWN, DEPRESSED OR HOPELESS: 0
SUM OF ALL RESPONSES TO PHQ QUESTIONS 1-9: 0

## 2022-01-11 NOTE — PROGRESS NOTES
Basilio Hsieh is a 77 y.o. male evaluated via telephone on 1/11/2022. To follow up on chronic conditions. Patient does not monitor his BP at home. States he does not have a blood pressure cuff. He does continue to follow up with Dr. Keara Malone for his elevated PSA and has an upcoming appt with him. Patient denies any chest pain or trouble breathing. He continues to smoke. He is now taking his vitamin D 5000 units every other day. He did not complete his Cologuard does not recall receiving a kit   Internal Administration   First Dose COVID-19, Pfizer, PF, 30mcg/0.3mL  02/13/2021   Second Dose COVID-19, Pfizer, PF, 30mcg/0.3mL   03/06/2021       Last COVID Lab No results found for: SARS-COV-2, SARS-COV-2 RNA, SARS-COV-2, SARS-COV-2, SARS-COV-2 BY PCR, SARS-COV-2, SARS-COV-2, SARS-COV-2         Wt Readings from Last 3 Encounters:   07/09/21 164 lb (74.4 kg)   01/08/21 167 lb (75.8 kg)   07/15/20 165 lb (74.8 kg)     BP Readings from Last 3 Encounters:   07/09/21 130/64   01/08/21 118/72   07/15/20 124/70     No results found for: LABA1C     ASSESSMENT PLAN      Diagnosis Orders   1. Essential hypertension, benign     2. Acquired thrombocytopenia (HCC)     3. Stage 3 chronic kidney disease, unspecified whether stage 3a or 3b CKD ([facility] [address]     4. Tobacco abuse     5. Erectile dysfunction, unspecified erectile dysfunction type     6. Vitamin D deficiency     7. Coronary artery disease due to lipid rich plaque     8. Colon cancer screening  COLOGUARD (FECAL DNA COLORECTAL CANCER SCREENING)   No symptoms that would suggest elevated blood pressure. He will need his routine labs in 6 months including a CBC to check his platelet count. Tobacco abuse - Pt will not quit despite understanding of risks of continued tobacco use, including cancer, heart attacks, strokes or death. Follows up with urology for ED.   He did reduce his vitamin D as suggested will finish 5000 units every other day then  2000 units daily.  There is no clinical evidence of coronary insufficiency or failure requiring any change in cardiac medication. Send another order for Cologuard. Patient should call the office immediately with new or ongoing signs or symptoms or worsening, or proceed to the emergency room. No changes in past medical history, past surgical history, social history, or family history were noted during the patient encounter unless specifically listed above. All updates of past medical history, past surgical history, social history, or family history were reviewed personally by me during the office visit. All problems listed in the assessment are stable unless noted otherwise. Medication profile reviewed personally by me during the visit. Medication side effects and possible impairments from medications were discussed as applicable. This document was prepared by a combination of typing and transcription through a voice recognition software. [x] Patient has completed an advance directive  [] Patient has NOT completed an advanced directive  [x] Patient has a documented healthcare surrogate  [] Patient does NOT have a documented healthcare surrogate  [] Discussed the importance of establishing and updating an advanced directive. Patient has questions at this time and those were answered. [x] Discussed the importance of establishing and updating an advanced directive. Patient does NOT have questions at this time. Discussed with: [x] Patient            [] Family             [] Other caregiver         Consent:  He and/or health care decision maker is aware that that he may receive a bill for this telephone service, depending on his insurance coverage, and has provided verbal consent to proceed: Yes      Documentation:  I communicated with the patient and/or health care decision maker about see above. Details of this discussion including any medical advice provided:  The above      I affirm this is a Patient Initiated Episode with an Established Patient who has not had a related appointment within my department in the past 7 days or scheduled within the next 24 hours.     Total Time: minutes: 5-10 minutes    Note: not billable if this call serves to triage the patient into an appointment for the relevant concern      Shasta Pack

## 2022-01-11 NOTE — PATIENT INSTRUCTIONS

## 2022-04-01 LAB
P2PSA: 38.2 PG/ML
PHI-HYB: 49
PROSTATE SPECIFIC ANTIGEN FREE: 1.8 NG/ML
PROSTATE SPECIFIC ANTIGEN PERCENT FREE: 33.3 %
PSA, TOTAL: 5.41 NG/ML (ref 0–4)

## 2022-05-17 ENCOUNTER — OFFICE VISIT (OUTPATIENT)
Dept: FAMILY MEDICINE CLINIC | Age: 77
End: 2022-05-17
Payer: MEDICARE

## 2022-05-17 VITALS
DIASTOLIC BLOOD PRESSURE: 78 MMHG | WEIGHT: 164 LBS | BODY MASS INDEX: 25.69 KG/M2 | SYSTOLIC BLOOD PRESSURE: 136 MMHG | HEART RATE: 87 BPM | OXYGEN SATURATION: 97 %

## 2022-05-17 DIAGNOSIS — R31.9 HEMATURIA, UNSPECIFIED TYPE: Primary | ICD-10-CM

## 2022-05-17 DIAGNOSIS — I25.83 CORONARY ARTERY DISEASE DUE TO LIPID RICH PLAQUE: ICD-10-CM

## 2022-05-17 DIAGNOSIS — N18.31 STAGE 3A CHRONIC KIDNEY DISEASE (HCC): ICD-10-CM

## 2022-05-17 DIAGNOSIS — I25.10 CORONARY ARTERY DISEASE DUE TO LIPID RICH PLAQUE: ICD-10-CM

## 2022-05-17 DIAGNOSIS — Z72.0 TOBACCO ABUSE: ICD-10-CM

## 2022-05-17 PROBLEM — N18.30 CHRONIC RENAL DISEASE, STAGE III (HCC): Status: ACTIVE | Noted: 2022-05-17

## 2022-05-17 PROCEDURE — 99214 OFFICE O/P EST MOD 30 MIN: CPT | Performed by: FAMILY MEDICINE

## 2022-05-17 NOTE — PROGRESS NOTES
Chief Complaint   Patient presents with    Hematuria        Internal Administration   First Dose COVID-19, Pfizer Purple top, DILUTE for use, 12+ yrs, 30mcg/0.3mL dose  2021   Second Dose COVID-19, PolicyStat Purple top, DILUTE for use, 12+ yrs, 30mcg/0.3mL dose   2021       Last COVID Lab No results found for: SARS-COV-2, SARS-COV-2 RNA, SARS-COV-2, SARS-COV-2, SARS-COV-2 BY PCR, SARS-COV-2, SARS-COV-2, SARS-COV-2         Wt Readings from Last 3 Encounters:   22 164 lb (74.4 kg)   21 164 lb (74.4 kg)   21 167 lb (75.8 kg)     BP Readings from Last 3 Encounters:   22 136/78   21 130/64   21 118/72     No results found for: LABA1C      HPI:   Leesa Hodges is a 77 y.o. (: 1945) here today for blood in the urine. Patient noticed this starting 2 days ago. He had helped lift a Rototiller. Last night he urinated without visible blood. Denies any pain or burning or other urinary difficulty. [] Patient has completed an advance directive  [] Patient has NOT completed an advanced directive  [] Patient has a documented healthcare surrogate  [] Patient does NOT have a documented healthcare surrogate  [] Discussed the importance of establishing and updating an advanced directive. Patient has questions at this time and those were answered. [] Discussed the importance of establishing and updating an advanced directive. Patient does NOT have questions at this time. Discussed with: [] Patient            [] Family             [] Other caregiver    Patient's medications, allergies, past medical, surgical, social and family histories were reviewed and updated as appropriateon 2022 at 5:12 PM.      Review of Systems  Additional review of systems may be scanned into the mediasection of this medical record. Any responses requiring further intervention were pursued.   OBJECTIVE:  Estimated body mass index is 25.69 kg/m² as calculated from the following:    Height as of 7/9/21: 5' 7\" (1.702 m). Weight as of this encounter: 164 lb (74.4 kg). Vitals:    05/17/22 1630 05/17/22 1641   BP: (!) 161/86 136/78   Pulse: 87    SpO2: 97%    Weight: 164 lb (74.4 kg)      Physical Exam  Vitals and nursing note reviewed. Constitutional:       General: He is not in acute distress. Appearance: He is well-developed. He is not diaphoretic. HENT:      Head: Normocephalic and atraumatic. Right Ear: External ear normal.      Left Ear: External ear normal.      Nose: Nose normal.   Eyes:      General: Lids are normal. No scleral icterus. Right eye: No discharge. Left eye: No discharge. Pupils: Pupils are equal, round, and reactive to light. Neck:      Thyroid: No thyromegaly. Vascular: No JVD. Cardiovascular:      Rate and Rhythm: Normal rate and regular rhythm. Pulmonary:      Effort: Pulmonary effort is normal. No respiratory distress. Abdominal:      General: There is no distension. Palpations: Abdomen is soft. There is no hepatomegaly, splenomegaly or mass. Tenderness: There is no abdominal tenderness. There is no right CVA tenderness or left CVA tenderness. Skin:     General: Skin is warm and dry. Coloration: Skin is not pale. Findings: No erythema or rash. Comments: Turgor normal   Psychiatric:         Behavior: Behavior normal.         Thought Content: Thought content normal.         Judgment: Judgment normal.       Urine shows gross hematuria     ASSESSMENT PLAN        Diagnosis Orders   1. Hematuria, unspecified type  Culture, Urine    CBC with Auto Differential    Basic Metabolic Panel    CT ABDOMEN PELVIS W WO CONTRAST Additional Contrast? None   2. Coronary artery disease due to lipid rich plaque     3. Stage 3a chronic kidney disease ([facility] [address]     4. Tobacco abuse     Patient having intermittent painless gross hematuria. Last night his urine he reports was clear to the naked eye. He is having no pain.   He is already established with Dr. Brice Bamberger for elevated PSA. Asked him to hold the Plavix continue the baby aspirin. Check CBC BMP get a CT of the abdomen. I told him that we would need to evaluate for growth in the urinary tract causing the bleeding. He discussed that he helped lift a Rototiller 3 days ago and wondered if that caused the bleeding I told him that that was unlikely. He has a long smoking history setting him up for high risk of bladder cancer. We will share the note with the urologist and further disposition pending CAT scan and labs. Patient should call the office immediately with new or ongoing signs or symptoms or worsening, or proceed to the emergency room. No changes in past medical history, past surgical history, social history, or family history were noted during the patient encounter unless specifically listed above. All updates of past medical history, past surgical history, social history, or family history were reviewed personally by me during the office visit. All problems listed in the assessment are stable unless noted otherwise. Medication profile reviewed personally by me during the visit. Medication side effects and possible impairments from medications were discussed as applicable. This document was prepared by a combination of typing and transcription through a voice recognition software.

## 2022-05-18 ENCOUNTER — HOSPITAL ENCOUNTER (OUTPATIENT)
Age: 77
Discharge: HOME | End: 2022-05-18
Payer: MEDICARE

## 2022-05-18 ENCOUNTER — TELEPHONE (OUTPATIENT)
Dept: FAMILY MEDICINE CLINIC | Age: 77
End: 2022-05-18

## 2022-05-18 ENCOUNTER — HOSPITAL ENCOUNTER (OUTPATIENT)
Dept: CT IMAGING | Age: 77
Discharge: HOME | End: 2022-05-18
Payer: MEDICARE

## 2022-05-18 DIAGNOSIS — R31.9 HEMATURIA, UNSPECIFIED TYPE: ICD-10-CM

## 2022-05-18 LAB
ANION GAP SERPL CALCULATED.3IONS-SCNC: 15 MMOL/L (ref 3–16)
BASOPHILS ABSOLUTE: 0 K/UL (ref 0–0.2)
BASOPHILS RELATIVE PERCENT: 1 %
BUN BLDV-MCNC: 21 MG/DL (ref 7–20)
CALCIUM SERPL-MCNC: 9.9 MG/DL (ref 8.3–10.6)
CHLORIDE BLD-SCNC: 106 MMOL/L (ref 99–110)
CO2: 23 MMOL/L (ref 21–32)
CREAT SERPL-MCNC: 1.6 MG/DL (ref 0.8–1.3)
EOSINOPHILS ABSOLUTE: 0.2 K/UL (ref 0–0.6)
EOSINOPHILS RELATIVE PERCENT: 5.1 %
GFR AFRICAN AMERICAN: 51
GFR NON-AFRICAN AMERICAN: 42
GLUCOSE BLD-MCNC: 97 MG/DL (ref 70–99)
HCT VFR BLD CALC: 41.4 % (ref 40.5–52.5)
HEMOGLOBIN: 14.3 G/DL (ref 13.5–17.5)
LYMPHOCYTES ABSOLUTE: 0.6 K/UL (ref 1–5.1)
LYMPHOCYTES RELATIVE PERCENT: 13.1 %
MCH RBC QN AUTO: 31.9 PG (ref 26–34)
MCHC RBC AUTO-ENTMCNC: 34.5 G/DL (ref 31–36)
MCV RBC AUTO: 92.6 FL (ref 80–100)
MONOCYTES ABSOLUTE: 0.3 K/UL (ref 0–1.3)
MONOCYTES RELATIVE PERCENT: 6.9 %
NEUTROPHILS ABSOLUTE: 3.6 K/UL (ref 1.7–7.7)
NEUTROPHILS RELATIVE PERCENT: 73.9 %
PDW BLD-RTO: 15.2 % (ref 12.4–15.4)
PLATELET # BLD: 143 K/UL (ref 135–450)
PMV BLD AUTO: 8.6 FL (ref 5–10.5)
POTASSIUM SERPL-SCNC: 4 MMOL/L (ref 3.5–5.1)
RBC # BLD: 4.47 M/UL (ref 4.2–5.9)
SODIUM BLD-SCNC: 144 MMOL/L (ref 136–145)
WBC # BLD: 4.8 K/UL (ref 4–11)

## 2022-05-18 PROCEDURE — 74178 CT ABD&PLV WO CNTR FLWD CNTR: CPT

## 2022-05-18 PROCEDURE — 6360000004 HC RX CONTRAST MEDICATION: Performed by: FAMILY MEDICINE

## 2022-05-18 PROCEDURE — 80048 BASIC METABOLIC PNL TOTAL CA: CPT

## 2022-05-18 PROCEDURE — 85025 COMPLETE CBC W/AUTO DIFF WBC: CPT

## 2022-05-18 PROCEDURE — 36415 COLL VENOUS BLD VENIPUNCTURE: CPT

## 2022-05-18 RX ORDER — SULFAMETHOXAZOLE AND TRIMETHOPRIM 800; 160 MG/1; MG/1
1 TABLET ORAL 2 TIMES DAILY
Qty: 30 TABLET | Refills: 0 | Status: SHIPPED | OUTPATIENT
Start: 2022-05-18 | End: 2022-06-02

## 2022-05-18 RX ADMIN — IOPAMIDOL 75 ML: 755 INJECTION, SOLUTION INTRAVENOUS at 15:16

## 2022-05-18 NOTE — TELEPHONE ENCOUNTER
DANIELLE      Spoke with patient he said he was not able to get the STAT blood work done yesterday because there was no one at the [facility] lab that was able to draw it. I called scheduling and scheduled his STAT CT for today at 300 at [facility] and instructed him to arrive at 200 to get the STAT blood work as well. He was also instructed not to eat or drink anything 4 hours prior to the scan.

## 2022-05-19 LAB — URINE CULTURE, ROUTINE: NORMAL

## 2022-05-31 ENCOUNTER — OFFICE VISIT (OUTPATIENT)
Dept: FAMILY MEDICINE CLINIC | Age: 77
End: 2022-05-31
Payer: MEDICARE

## 2022-05-31 ENCOUNTER — TELEPHONE (OUTPATIENT)
Dept: FAMILY MEDICINE CLINIC | Age: 77
End: 2022-05-31

## 2022-05-31 VITALS
HEIGHT: 67 IN | SYSTOLIC BLOOD PRESSURE: 155 MMHG | WEIGHT: 158 LBS | HEART RATE: 78 BPM | DIASTOLIC BLOOD PRESSURE: 92 MMHG | BODY MASS INDEX: 24.8 KG/M2 | OXYGEN SATURATION: 96 %

## 2022-05-31 DIAGNOSIS — I25.83 CORONARY ARTERY DISEASE DUE TO LIPID RICH PLAQUE: ICD-10-CM

## 2022-05-31 DIAGNOSIS — I25.10 CORONARY ARTERY DISEASE DUE TO LIPID RICH PLAQUE: ICD-10-CM

## 2022-05-31 DIAGNOSIS — I10 ESSENTIAL HYPERTENSION, BENIGN: ICD-10-CM

## 2022-05-31 DIAGNOSIS — N20.0 KIDNEY STONES: ICD-10-CM

## 2022-05-31 DIAGNOSIS — Z01.818 PREOP EXAMINATION: Primary | ICD-10-CM

## 2022-05-31 DIAGNOSIS — Z72.0 TOBACCO ABUSE: ICD-10-CM

## 2022-05-31 DIAGNOSIS — N18.31 STAGE 3A CHRONIC KIDNEY DISEASE (HCC): ICD-10-CM

## 2022-05-31 PROCEDURE — 93000 ELECTROCARDIOGRAM COMPLETE: CPT | Performed by: NURSE PRACTITIONER

## 2022-05-31 PROCEDURE — 99214 OFFICE O/P EST MOD 30 MIN: CPT | Performed by: NURSE PRACTITIONER

## 2022-05-31 PROCEDURE — 1123F ACP DISCUSS/DSCN MKR DOCD: CPT | Performed by: NURSE PRACTITIONER

## 2022-05-31 NOTE — PROGRESS NOTES
[facility] [facility] SITE                             Preoperative Evaluation        Megan Haskins  YOB: 1945    Date of Service:  5/31/2022    Vitals:    05/31/22 1428 05/31/22 1433   BP: (!) 154/99 (!) 155/92   Site: Right Upper Arm Right Upper Arm   Position: Sitting Sitting   Cuff Size: Medium Adult Medium Adult   Pulse: 87 78   SpO2: 96%    Weight: 158 lb (71.7 kg)    Height: 5' 7\" (1.702 m)       Wt Readings from Last 2 Encounters:   05/31/22 158 lb (71.7 kg)   05/17/22 164 lb (74.4 kg)     BP Readings from Last 3 Encounters:   05/31/22 (!) 155/92   05/17/22 136/78   07/09/21 130/64        No chief complaint on file. Allergies   Allergen Reactions    Lisinopril Other (See Comments)     Tongue swelling    Ampicillin      No outpatient medications have been marked as taking for the 5/31/22 encounter (Office Visit) with MOSES Cardenas CNP. This patient presents to the office today for a preoperative consultation at the request of surgeon, Dr. Davey Salas, who plans on [facility]. The current problem began about 2 to 3 weeks ago, and symptoms have been resolved with time. Conservative therapy: N/A.     Planned anesthesia: None   Known anesthesia problems: None   Bleeding risk: Takes Plavix and Aspirin   Personal or FH of DVT/PE: No      Patient Active Problem List   Diagnosis    Essential hypertension, benign    Tobacco abuse    ED (erectile dysfunction)    Vitamin D deficiency    Coronary artery disease due to lipid rich plaque    Mixed hyperlipidemia    LANDIN (dyspnea on exertion)    Positive FIT (fecal immunochemical test)    ACE inhibitor-aggravated angioedema, initial encounter    Acquired thrombocytopenia (HCC)    Stage 3 chronic kidney disease ([address]    Elevated PSA    Chronic renal disease, stage III ([address] [635132] Past Medical History:   Diagnosis Date    CAD (coronary artery disease)     Carotid bruit     ED (erectile dysfunction)     Hyperlipidemia     Hypertension     Tobacco abuse      Past Surgical History:   Procedure Laterality Date    CORONARY ANGIOPLASTY WITH STENT PLACEMENT       No family history on file. Social History     Socioeconomic History    Marital status:      Spouse name: Not on file    Number of children: Not on file    Years of education: Not on file    Highest education level: Not on file   Occupational History    Not on file   Tobacco Use    Smoking status: Current Every Day Smoker     Packs/day: 2.00     Years: 60.00     Pack years: 120.00     Start date: 6/18/1957    Smokeless tobacco: Never Used   Vaping Use    Vaping Use: Never used   Substance and Sexual Activity    Alcohol use: No     Alcohol/week: 0.0 standard drinks    Drug use: No    Sexual activity: Yes     Partners: Female   Other Topics Concern    Not on file   Social History Narrative    Not on file     Social Determinants of Health     Financial Resource Strain: Low Risk     Difficulty of Paying Living Expenses: Not hard at all   Food Insecurity: No Food Insecurity    Worried About [address] in the Last Year: Never true    [address] in the Last Year: Never true   Transportation Needs:     Lack of Transportation (Medical): Not on file    Lack of Transportation (Non-Medical):  Not on file   Physical Activity:     Days of Exercise per Week: Not on file    Minutes of Exercise per Session: Not on file   Stress:     Feeling of Stress : Not on file   Social Connections:     Frequency of Communication with Friends and Family: Not on file    Frequency of Social Gatherings with Friends and Family: Not on file    Attends [employer] Services: Not on file    Active Member of Clubs or Organizations: Not on file    Attends Club or Organization Meetings: Not on file    Marital Status: Not on Sodium 05/18/2022 144     Potassium 05/18/2022 4.0     Chloride 05/18/2022 106     CO2 05/18/2022 23     Anion Gap 05/18/2022 15     Glucose 05/18/2022 97     BUN 05/18/2022 21*    CREATININE 05/18/2022 1.6*    GFR Non- 05/18/2022 42*    GFR  05/18/2022 51*    Calcium 05/18/2022 9.9     WBC 05/18/2022 4.8     RBC 05/18/2022 4.47     Hemoglobin 05/18/2022 14.3     Hematocrit 05/18/2022 41.4     MCV 05/18/2022 92.6     MCH 05/18/2022 31.9     MCHC 05/18/2022 34.5     RDW 05/18/2022 15.2     Platelets 03/84/9024 143     MPV 05/18/2022 8.6     Neutrophils % 05/18/2022 73.9     Lymphocytes % 05/18/2022 13.1     Monocytes % 05/18/2022 6.9     Eosinophils % 05/18/2022 5.1     Basophils % 05/18/2022 1.0     Neutrophils Absolute 05/18/2022 3.6     Lymphocytes Absolute 05/18/2022 0.6*    Monocytes Absolute 05/18/2022 0.3     Eosinophils Absolute 05/18/2022 0.2     Basophils Absolute 05/18/2022 0.0    Office Visit on 05/17/2022   Component Date Value    Urine Culture, Routine 05/17/2022 No growth at 18 to 36 hours    Orders Only on 03/31/2022   Component Date Value    PSA, TOTAL 03/31/2022 5.41*    PHI-Hyb 03/31/2022 49     PSA, Free 03/31/2022 1.80     p2PSA 03/31/2022 38.2     PSA, Free Pct 03/31/2022 33.3            Assessment:       77 y.o. patient with planned surgery as above. Known risk factors for perioperative complications: HTN, CAD, CKD III, thrombocytopenia, tobacco abuse  Current medications which may produce withdrawal symptoms if withheld perioperatively: none     1. Preop examination    2. Essential hypertension, benign    3. Stage 3a chronic kidney disease ([address]    4. Kidney stones    5. Coronary artery disease due to lipid rich plaque         Plan:     1. Preoperative workup as follows: ECG  2. Further recommendations from consultants: Yes - cardiac clearance needed     3.  Change in medication regimen before surgery: Take Amlodopine on morning of surgery with sip of water, and hold all other medications until after surgery, Discontinue Clopidogrel 3 days prior to surgery or as recommended by surgeon. Stop NSAIDS (Motrin, Aleve, Ibuprofen), vitamin E, aspirin, fish oil 7-10 days prior to surgery unless instructed otherwise by surgeon. 4. Prophylaxis for cardiac events with perioperative beta-blockers: Not indicated  ACC/AHA indications for pre-operative beta-blocker use:    · Vascular surgery with history of postitive stress test  · Intermediate or high risk surgery with history of CAD   · Intermediate or high risk surgery with multiple clinical predictors of CAD- 2 of the following: history of compensated or prior heart failure, history of cerebrovascular disease, DM, or renal insufficiency    Routine administration of higher-dose, long-acting metoprolol in beta-blocker-naïve patients on the day of surgery, and in the absence of dose titration is associated with an overall increase in mortality. Beta-blockers should be started days to weeks prior to surgery and titrated to pulse < 70.  5. Deep vein thrombosis prophylaxis: regimen to be chosen by surgical team  6. No contraindications to planned surgery pending cardiac clearance       If you have questions, please do not hesitate to call me ([phone]).      Sincerely,    Jahaira Merida, DNP, APRN, FNP-BC

## 2022-05-31 NOTE — TELEPHONE ENCOUNTER
Intermediate risk clinically for low risk procedure. Proceed at inherent intermediate risk. EKG shows NSR and RBBB. He needs to make f/u appt. I have not seen him since 2019. Normal nuc stress at that time.

## 2022-05-31 NOTE — TELEPHONE ENCOUNTER
Patient is needing cardiac clearance for Cystoscopy on 6/6/22. Please review EKG from today 5/31/22.  Please respond to Samson Krishna DNP to let us know if pt will be cleared or pt may need to be rescheduled

## 2022-06-01 ENCOUNTER — TELEPHONE (OUTPATIENT)
Dept: FAMILY MEDICINE CLINIC | Age: 77
End: 2022-06-01

## 2022-06-01 NOTE — TELEPHONE ENCOUNTER
Patient called and clarified that dr. Guido Spine already gave the clearace for the surgery. He just wants to see patient for regular visit for his cardiac issues no associated with his upcoming surgery.  Patients confusion was clarified

## 2022-06-01 NOTE — TELEPHONE ENCOUNTER
Patient called and informed of his risk per cardiac clearance from dr. Isatu Blount informed he needs to schedule apt with dr. Isatu Blount and provided with number to call to schedule.  Clearance routed to dr. Dieter Ramirez

## 2022-06-01 NOTE — TELEPHONE ENCOUNTER
----- Message from Carole Rudolph sent at 6/1/2022  9:58 AM EDT -----  Subject: Message to Provider    QUESTIONS  Information for Provider? Patient would like to speak with staff about   upcoming procedure. Would like a call today if possible states he can't   see Dr. Luisana Dunn till June 30 th.  ---------------------------------------------------------------------------  --------------  Rachele Carrier INFO  What is the best way for the office to contact you? OK to leave message on   voicemail  Preferred Call Back Phone Number? [phone]  ---------------------------------------------------------------------------  --------------  SCRIPT ANSWERS  Relationship to Patient?  Self

## 2022-06-01 NOTE — TELEPHONE ENCOUNTER
Please review cardiac clearance per Dr. Ranjit Luong. If any further questions please contact our office @ [phone].  Thank you, Marlene Fishman

## 2022-06-30 ENCOUNTER — OFFICE VISIT (OUTPATIENT)
Dept: CARDIOLOGY CLINIC | Age: 77
End: 2022-06-30
Payer: MEDICARE

## 2022-06-30 VITALS
WEIGHT: 158.5 LBS | HEART RATE: 84 BPM | BODY MASS INDEX: 24.88 KG/M2 | DIASTOLIC BLOOD PRESSURE: 80 MMHG | OXYGEN SATURATION: 97 % | HEIGHT: 67 IN | SYSTOLIC BLOOD PRESSURE: 118 MMHG

## 2022-06-30 DIAGNOSIS — I25.10 CORONARY ARTERY DISEASE DUE TO LIPID RICH PLAQUE: ICD-10-CM

## 2022-06-30 DIAGNOSIS — R42 DIZZINESS: ICD-10-CM

## 2022-06-30 DIAGNOSIS — Z72.0 TOBACCO ABUSE: Primary | ICD-10-CM

## 2022-06-30 DIAGNOSIS — E78.2 MIXED HYPERLIPIDEMIA: ICD-10-CM

## 2022-06-30 DIAGNOSIS — Z79.899 MEDICATION MANAGEMENT: ICD-10-CM

## 2022-06-30 DIAGNOSIS — E78.5 HYPERLIPIDEMIA, UNSPECIFIED HYPERLIPIDEMIA TYPE: ICD-10-CM

## 2022-06-30 DIAGNOSIS — Z01.810 ENCOUNTER FOR PRE-OPERATIVE CARDIOVASCULAR CLEARANCE: ICD-10-CM

## 2022-06-30 DIAGNOSIS — I25.83 CORONARY ARTERY DISEASE DUE TO LIPID RICH PLAQUE: ICD-10-CM

## 2022-06-30 DIAGNOSIS — I10 ESSENTIAL HYPERTENSION, BENIGN: ICD-10-CM

## 2022-06-30 PROCEDURE — 1123F ACP DISCUSS/DSCN MKR DOCD: CPT | Performed by: INTERNAL MEDICINE

## 2022-06-30 PROCEDURE — 99204 OFFICE O/P NEW MOD 45 MIN: CPT | Performed by: INTERNAL MEDICINE

## 2022-06-30 RX ORDER — SIMVASTATIN 40 MG
TABLET ORAL
Qty: 90 TABLET | Refills: 3 | Status: SHIPPED | OUTPATIENT
Start: 2022-06-30

## 2022-06-30 RX ORDER — AMLODIPINE BESYLATE 10 MG/1
TABLET ORAL
Qty: 90 TABLET | Refills: 3 | Status: SHIPPED | OUTPATIENT
Start: 2022-06-30

## 2022-06-30 RX ORDER — CLOPIDOGREL BISULFATE 75 MG/1
TABLET ORAL
Qty: 90 TABLET | Refills: 3 | Status: SHIPPED | OUTPATIENT
Start: 2022-06-30

## 2022-06-30 RX ORDER — NITROGLYCERIN 0.4 MG/1
0.4 TABLET SUBLINGUAL EVERY 5 MIN PRN
Qty: 25 TABLET | Refills: 1 | Status: SHIPPED | OUTPATIENT
Start: 2022-06-30

## 2022-06-30 NOTE — PATIENT INSTRUCTIONS
Your provider has ordered testing for further evaluation. An order/prescription has been included in your paper work.  To schedule outpatient testing, contact Central Scheduling by calling [phone] ([phone]). PLAN:  1. Discussion of importance not to take Cialis and Nitroglycerin within 24 hours of one another as can lead to medical emergency. 2. I recommend that the patient continue their currently prescribed medications. Their drug modifiable risk factors appear to be well controlled. I will continue to address the need/dosing of medications in future visits. 3. Tobacco cessation high risk factor to heart attack or stoke  ~1-800-QUIT-NOW  4. Order lab work ~ lipid panel and liver function test  ~ Please be fasting for 8 hrs prior   5. Order carotid artery ultrasound ~ dizziness  6. Follow up in 6 months or sooner if possible at [facility].

## 2022-06-30 NOTE — LETTER
[facility] - [address]  Phone: [phone]  Fax: [phone]    Jacinda Sosa MD    June 30, 2022     Valentin Haji, [address] [place] [address]    Patient: Aggie Lamas   MR Number: [id number]   YOB: 1945   Date of Visit: 6/30/2022       Dear Valentin Haji:    Thank you for referring Belinda Ogden to me for evaluation/treatment. Below are the relevant portions of my assessment and plan of care. If you have questions, please do not hesitate to call me. I look forward to following Mariann Dominguez along with you.     Sincerely,      Jacinda Sosa MD

## 2022-06-30 NOTE — PROGRESS NOTES
[address] [facility]      Patient: Elisabeth Rodas  YOB: 1945  Date: 6/30/22     Subjective: Patient is being seen today for cardiology follow up OF CAD, s/p stents in LAD and RCA May 2005, HTN, and HLD; denies any complaints. History of Present Illness:    Mr. Fariha Roberts is a 77 y.o.  male here today for routine f/u CAD s/p stents in LAD and RCA in May 2005, HTN, and HLD. Last OV 4/19. His carotid doppler from 5/12 showed no hemodynamically significant lesion (no change 12/10 study). I have recommended that he start a low dose BB Toprol XL but he refuses. He is fearful of starting new medications. Note US abdominal aorta 1/7/19  done at [facility] was negative. Since last OV he had most recent GXT nuc 5/08/2019 showing normal isotope at stress and rest, no ischemia or scarring (no sig change 5/12). Most recent EKG 05/31/22 SR, Right BBB, and old anterior infarct, no change from EKG 2019. Today he feels good overall and denies any problems with daily activities. He states he is currently smoking and does not want to quit. Patient currently denies any weight gain, edema, palpitations, chest pain, shortness of breath, dizziness, and syncope. He is taking all medications as prescribed, tolerating well. Denies recent issues with bleeding or bruising. 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 On exam today he had dizziness with stethoscope placement checking right carotid artery. He continues to smoke 2ppd and NO desire to quit. Weight is #158 lbs, down 7# since 04/2019.     Medications    Current Outpatient Medications:     simvastatin (ZOCOR) 40 MG tablet, TAKE 1 TABLET BY MOUTH EVERY DAY AT NIGHT, Disp: 90 tablet, Rfl: 3    amLODIPine (NORVASC) 10 MG tablet, TAKE 1 TABLET BY MOUTH EVERY DAY, Disp: 90 tablet, Rfl: 3    clopidogrel (PLAVIX) 75 MG tablet, TAKE 1 TABLET BY MOUTH EVERY DAY, Disp: 90 tablet, Rfl: 3    nitroGLYCERIN (NITROSTAT) 0.4 MG SL tablet, Place 1 tablet under the tongue every 5 minutes as needed for Chest pain, Disp: 25 tablet, Rfl: 1    [employer] VIT D 5000 HIGH-POTENCY 5000 UNITS CAPS capsule, TAKE ONE CAPSULE BY MOUTH DAILY, Disp: 30 capsule, Rfl: 11    aspirin 81 MG EC tablet, Take 81 mg by mouth daily. , Disp: , Rfl:     tadalafil (CIALIS) 20 MG tablet, Take 1 tablet by mouth as needed for Erectile Dysfunction (Patient not taking: Reported on 6/30/2022), Disp: 10 tablet, Rfl: 3    Review of Systems:  Cardiac: No chest pain and no palpitations. Respiratory: No new SOB, PND, orthopnea or cough. Neurological: No syncope or TIA. General: No major weight gain or loss, no fatigue, no weakness, no fever. Musculoskeletal: No new muscle or joint pain. GI: No change in bowel habits. Psychiatric: No anxiety, no panic, no insomnia, no depression. Skin: No rash. Lab Results   Component Value Date/Time    CHOL 132 07/07/2021 09:28 AM     No results for input(s): AST, ALT, ALB, BILIDIR, BILITOT, ALKPHOS in the last 72 hours. Vitals:    06/30/22 1519   BP: 118/80   Pulse: 84   SpO2: 97%       Physical Examination  Constitutional: He is oriented to person, place and time. He appears well developed and well nourished. Head: Normocephalic and atraumatic. Neck: Neck supple. No JVD present. Carotid bruit is not present. No mass and no thyromegaly present. Cardiovascular: Normal rate, regular rhythm, normal heart sounds and intact distal pulses. Exam reveals no gallop and no friction rub. No murmur heard. Pulmonary/Chest: Effort normal and breath sounds normal.  No respiratory distress. He has no wheezes, rhonchi or rales. Abdominal: Soft, non-tender. Bowel sounds and aorta are normal.  He exhibits no organomegaly, mass or bruit. Extremities: No edema, cyanosis or clubbing. Neurological: He is alert, and oriented to person, place and time. He has normal reflexes. No cranial nerve deficit. Coordination normal.  Skin: Skin is warm and dry.   There is no rash or diaphoresis. Phychiatric: He has a normal mood and affect. He speech is normal and behavior is normal.  Judgement, cognition and memory are normal.    No components found for: CHLPLTotal Cholesterol 139      10/28/2014   Lab Results   Component Value Date    TRIG 136 07/07/2021    TRIG 140 01/14/2020    TRIG 68 07/09/2019     Lab Results   Component Value Date    HDL 28 (L) 07/07/2021    HDL 30 (L) 01/14/2020    HDL 30 (L) 07/09/2019     Lab Results   Component Value Date    LDLCALC 77 07/07/2021    LDLCALC 66 01/14/2020    LDLCALC 60 07/09/2019     Lab Results   Component Value Date    LABVLDL 27 07/07/2021    LABVLDL 28 01/14/2020    LABVLDL 14 07/09/2019       Patient Active Problem List   Diagnoses   1. HTN (hypertension):  Stable and well controlled and will continue present medical regimen. 2. CAD (coronary artery disease): s/p stents in LAD and RCA in May 2005. Most recent GXT nuc 5/08/2019 showing normal isotope at stress and rest, no ischemia or scarring. There are no concerning symptoms for angina currently. Continue current regimen. 3. Hyperlipidemia:  Most recent labs 7/7/21 see results above I personally reviewed. Well controlled and will continue current medical regimen. At goal except for lower HDL which doesn't show definite clinical benefit treating. Continue present zocor. Repeat Lipid/Liver panels near future. 4.   Tobacco abuse: He is still not interested in quitting and will NOT quit. 5.   Dizziness: Note he became very dizzy when I palpated right carotid. Given hx CAD, tobacco abuse,and exam  with symptoms I am concerned about carotid artery disease and will order doppler US. PLAN:  1. Discussion of importance not to take Cialis and Nitroglycerin within 24 hours of one another as can lead to medical emergency. 2. I recommend that the patient continue their currently prescribed medications. Their drug modifiable risk factors appear to be well controlled.  I will continue to address the need/dosing of medications in future visits. 3. Tobacco cessation high risk factor to heart attack or stoke  ~1-800-QUIT-NOW  4. Order lab work ~ lipid panel and liver function test  ~ Please be fasting for 8 hrs prior   5. Order carotid artery ultrasound ~ dizziness  6. Follow up in 6 months or sooner if possible at [facility]. Scribe's attestation: This note was scribed in the presence of Rory Garcia by Svitlana Guerin RN     I, Dr. Brian Sr, personally performed the services described in this documentation, as scribed by the above signed scribe in my presence. It is both accurate and complete to my knowledge. I agree with the details independently gathered by the clinical support staff, while the remaining scribed note accurately describes my personal service to the patient. Cost of prescription medications and patient compliance have been reviewed with patient. All questions answered. Dr. Yoanna Birmingham.  [place] Sos

## 2022-07-12 ENCOUNTER — OFFICE VISIT (OUTPATIENT)
Dept: FAMILY MEDICINE CLINIC | Age: 77
End: 2022-07-12
Payer: MEDICARE

## 2022-07-12 VITALS
BODY MASS INDEX: 24.59 KG/M2 | SYSTOLIC BLOOD PRESSURE: 122 MMHG | HEART RATE: 75 BPM | OXYGEN SATURATION: 95 % | DIASTOLIC BLOOD PRESSURE: 80 MMHG | WEIGHT: 157 LBS

## 2022-07-12 DIAGNOSIS — R31.0 GROSS HEMATURIA: ICD-10-CM

## 2022-07-12 DIAGNOSIS — I10 ESSENTIAL HYPERTENSION, BENIGN: Primary | ICD-10-CM

## 2022-07-12 DIAGNOSIS — R73.9 HYPERGLYCEMIA: ICD-10-CM

## 2022-07-12 DIAGNOSIS — I25.83 CORONARY ARTERY DISEASE DUE TO LIPID RICH PLAQUE: ICD-10-CM

## 2022-07-12 DIAGNOSIS — R97.20 ELEVATED PSA: ICD-10-CM

## 2022-07-12 DIAGNOSIS — E55.9 VITAMIN D DEFICIENCY: ICD-10-CM

## 2022-07-12 DIAGNOSIS — Z72.0 TOBACCO ABUSE: ICD-10-CM

## 2022-07-12 DIAGNOSIS — Z13.21 ENCOUNTER FOR VITAMIN DEFICIENCY SCREENING: ICD-10-CM

## 2022-07-12 DIAGNOSIS — E78.2 MIXED HYPERLIPIDEMIA: ICD-10-CM

## 2022-07-12 DIAGNOSIS — I25.10 CORONARY ARTERY DISEASE DUE TO LIPID RICH PLAQUE: ICD-10-CM

## 2022-07-12 DIAGNOSIS — N18.31 STAGE 3A CHRONIC KIDNEY DISEASE (HCC): ICD-10-CM

## 2022-07-12 LAB
A/G RATIO: 1.8 (ref 1.1–2.2)
ALBUMIN SERPL-MCNC: 4.7 G/DL (ref 3.4–5)
ALP BLD-CCNC: 97 U/L (ref 40–129)
ALT SERPL-CCNC: 12 U/L (ref 10–40)
ANION GAP SERPL CALCULATED.3IONS-SCNC: 12 MMOL/L (ref 3–16)
AST SERPL-CCNC: 17 U/L (ref 15–37)
BASOPHILS ABSOLUTE: 0.1 K/UL (ref 0–0.2)
BASOPHILS RELATIVE PERCENT: 1.2 %
BILIRUB SERPL-MCNC: 0.6 MG/DL (ref 0–1)
BUN BLDV-MCNC: 20 MG/DL (ref 7–20)
CALCIUM SERPL-MCNC: 9.5 MG/DL (ref 8.3–10.6)
CHLORIDE BLD-SCNC: 108 MMOL/L (ref 99–110)
CHOLESTEROL, TOTAL: 134 MG/DL (ref 0–199)
CO2: 24 MMOL/L (ref 21–32)
CREAT SERPL-MCNC: 1.5 MG/DL (ref 0.8–1.3)
EOSINOPHILS ABSOLUTE: 0.3 K/UL (ref 0–0.6)
EOSINOPHILS RELATIVE PERCENT: 5.9 %
GFR AFRICAN AMERICAN: 55
GFR NON-AFRICAN AMERICAN: 46
GLUCOSE BLD-MCNC: 103 MG/DL (ref 70–99)
HCT VFR BLD CALC: 42.2 % (ref 40.5–52.5)
HDLC SERPL-MCNC: 36 MG/DL (ref 40–60)
HEMOGLOBIN: 14.2 G/DL (ref 13.5–17.5)
LDL CHOLESTEROL CALCULATED: 82 MG/DL
LYMPHOCYTES ABSOLUTE: 0.6 K/UL (ref 1–5.1)
LYMPHOCYTES RELATIVE PERCENT: 10.8 %
MCH RBC QN AUTO: 31.6 PG (ref 26–34)
MCHC RBC AUTO-ENTMCNC: 33.7 G/DL (ref 31–36)
MCV RBC AUTO: 94 FL (ref 80–100)
MONOCYTES ABSOLUTE: 0.4 K/UL (ref 0–1.3)
MONOCYTES RELATIVE PERCENT: 6.5 %
NEUTROPHILS ABSOLUTE: 4.1 K/UL (ref 1.7–7.7)
NEUTROPHILS RELATIVE PERCENT: 75.6 %
PDW BLD-RTO: 15.9 % (ref 12.4–15.4)
PLATELET # BLD: 145 K/UL (ref 135–450)
PMV BLD AUTO: 9.1 FL (ref 5–10.5)
POTASSIUM SERPL-SCNC: 4.2 MMOL/L (ref 3.5–5.1)
PROSTATE SPECIFIC ANTIGEN: 10.82 NG/ML (ref 0–4)
RBC # BLD: 4.5 M/UL (ref 4.2–5.9)
SODIUM BLD-SCNC: 144 MMOL/L (ref 136–145)
TOTAL PROTEIN: 7.3 G/DL (ref 6.4–8.2)
TRIGL SERPL-MCNC: 82 MG/DL (ref 0–150)
VITAMIN D 25-HYDROXY: 67.9 NG/ML
VLDLC SERPL CALC-MCNC: 16 MG/DL
WBC # BLD: 5.4 K/UL (ref 4–11)

## 2022-07-12 PROCEDURE — 99214 OFFICE O/P EST MOD 30 MIN: CPT | Performed by: FAMILY MEDICINE

## 2022-07-12 PROCEDURE — 1123F ACP DISCUSS/DSCN MKR DOCD: CPT | Performed by: FAMILY MEDICINE

## 2022-07-12 PROCEDURE — 36415 COLL VENOUS BLD VENIPUNCTURE: CPT | Performed by: FAMILY MEDICINE

## 2022-07-12 SDOH — ECONOMIC STABILITY: FOOD INSECURITY: WITHIN THE PAST 12 MONTHS, THE FOOD YOU BOUGHT JUST DIDN'T LAST AND YOU DIDN'T HAVE MONEY TO GET MORE.: NEVER TRUE

## 2022-07-12 SDOH — ECONOMIC STABILITY: FOOD INSECURITY: WITHIN THE PAST 12 MONTHS, YOU WORRIED THAT YOUR FOOD WOULD RUN OUT BEFORE YOU GOT MONEY TO BUY MORE.: NEVER TRUE

## 2022-07-12 ASSESSMENT — SOCIAL DETERMINANTS OF HEALTH (SDOH): HOW HARD IS IT FOR YOU TO PAY FOR THE VERY BASICS LIKE FOOD, HOUSING, MEDICAL CARE, AND HEATING?: NOT HARD AT ALL

## 2022-07-12 NOTE — PROGRESS NOTES
Chief Complaint   Patient presents with    Hypertension    Hyperlipidemia    Coronary Artery Disease        Internal Administration   First Dose COVID-19, PFIZER PURPLE top, DILUTE for use, (age 15 y+), 30mcg/0.3mL  2021   Second Dose COVID-19, PFIZER PURPLE top, DILUTE for use, (age 15 y+), 30mcg/0.3mL   2021       Last COVID Lab No results found for: SARS-COV-2, SARS-COV-2 RNA, SARS-COV-2, SARS-COV-2, SARS-COV-2 BY PCR, SARS-COV-2, SARS-COV-2, SARS-COV-2          Wt Readings from Last 3 Encounters:   22 157 lb (71.2 kg)   22 158 lb 8 oz (71.9 kg)   22 158 lb (71.7 kg)     BP Readings from Last 3 Encounters:   22 122/80   22 118/80   22 (!) 155/92      No results found for: LABA1C    HPI:  Yadira Molina is a 77 y.o. (: 1945) here today for    Patient states that he did go through with the cystoscopy for his gross hematuria. He has not had anymore episodes of the hematuria. They did not see anything that would have caused the hematuria patient states. He did go to see cardiology due to dizziness. They ordered a carotid doppler which he states has not been scheduled yet. Provided patient with number to call and schedule appointment for the carotid doppler. [x] Patient has completed an advance directive  [] Patient has NOT completed an advanced directive  [x] Patient has a documented healthcare surrogate  [] Patient does NOT have a documented healthcare surrogate  [] Discussed the importance of establishing and updating an advanced directive. Patient has questions at this time and those were answered. [x] Discussed the importance of establishing and updating an advanced directive. Patient does NOT have questions at this time.     Discussed with: [x] Patient            [] Family             [] Other caregiver    Patient's medications, allergies, past medical, surgical, social and family histories were reviewed and updated asappropriate on 7/12/2022 at 7:54 AM.    ROS:  Review of Systems    All other systems reviewed and are negative except as noted above on 7/12/2022 at 7:54 AM. Additional review of systems may be scanned into the media section ofthis medical record. Any responses requiring further intervention were pursued. Past Medical History:   Diagnosis Date    CAD (coronary artery disease)     Carotid bruit     ED (erectile dysfunction)     Hyperlipidemia     Hypertension     Tobacco abuse      History reviewed. No pertinent family history. Social History     Socioeconomic History    Marital status:      Spouse name: Not on file    Number of children: Not on file    Years of education: Not on file    Highest education level: Not on file   Occupational History    Not on file   Tobacco Use    Smoking status: Current Every Day Smoker     Packs/day: 2.00     Years: 60.00     Pack years: 120.00     Start date: 6/18/1957    Smokeless tobacco: Never Used   Vaping Use    Vaping Use: Never used   Substance and Sexual Activity    Alcohol use: No     Alcohol/week: 0.0 standard drinks    Drug use: No    Sexual activity: Yes     Partners: Female   Other Topics Concern    Not on file   Social History Narrative    Not on file     Social Determinants of Health     Financial Resource Strain: Low Risk     Difficulty of Paying Living Expenses: Not hard at all   Food Insecurity: No Food Insecurity    Worried About [address] in the Last Year: Never true    [address] in the Last Year: Never true   Transportation Needs:     Lack of Transportation (Medical): Not on file    Lack of Transportation (Non-Medical):  Not on file   Physical Activity:     Days of Exercise per Week: Not on file    Minutes of Exercise per Session: Not on file   Stress:     Feeling of Stress : Not on file   Social Connections:     Frequency of Communication with Friends and Family: Not on file    Frequency of Social Gatherings with Friends and Family: Not on file    Attends Taoist Services: Not on file    Active Member of Clubs or Organizations: Not on file    Attends Club or Organization Meetings: Not on file    Marital Status: Not on file   Intimate Partner Violence:     Fear of Current or Ex-Partner: Not on file    Emotionally Abused: Not on file    Physically Abused: Not on file    Sexually Abused: Not on file   Housing Stability:     Unable to Pay for Housing in the Last Year: Not on file    Number of [place] in the Last Year: Not on file    Unstable Housing in the Last Year: Not on file     Prior to Visit Medications    Medication Sig Taking? Authorizing Provider   simvastatin (ZOCOR) 40 MG tablet Take one tablet by mouth every night Yes Juvenal Hobbs MD   amLODIPine (NORVASC) 10 MG tablet Take one tablet by mouth daily Yes Juvenal Hobbs MD   clopidogrel (PLAVIX) 75 MG tablet Take 1 tablet by mouth daily Yes Juvenal Hobbs MD   nitroGLYCERIN (NITROSTAT) 0.4 MG SL tablet Place 1 tablet under the tongue every 5 minutes as needed for Chest pain Yes Juvenal Hobbs MD   tadalafil (CIALIS) 20 MG tablet Take 1 tablet by mouth as needed for Erectile Dysfunction Yes Adilson Venegas MD   [employer] VIT D 5000 HIGH-POTENCY 5000 UNITS CAPS capsule TAKE ONE CAPSULE BY MOUTH DAILY Yes Adilson Venegas MD   aspirin 81 MG EC tablet Take 81 mg by mouth daily. Yes Historical Provider, MD     Allergies   Allergen Reactions    Lisinopril Other (See Comments)     Tongue swelling    Ampicillin        OBJECTIVE:  Estimated body mass index is 24.59 kg/m² as calculated from the following:    Height as of 6/30/22: 5' 7\" (1.702 m). Weight as of this encounter: 157 lb (71.2 kg). Vitals:    07/12/22 0739 07/12/22 0740 07/12/22 0753   BP: (!) 157/52 (!) 147/75 122/80   Pulse: 75     SpO2: 95%     Weight: 157 lb (71.2 kg)         Physical Exam  Vitals and nursing note reviewed.    Constitutional:       General: He is not in acute distress. Appearance: He is well-developed. He is not diaphoretic. HENT:      Head: Normocephalic and atraumatic. Right Ear: External ear normal.      Left Ear: External ear normal.      Nose: Nose normal.   Eyes:      General: Lids are normal. No scleral icterus. Right eye: No discharge. Left eye: No discharge. Pupils: Pupils are equal, round, and reactive to light. Neck:      Thyroid: No thyromegaly. Vascular: No JVD. Cardiovascular:      Rate and Rhythm: Normal rate and regular rhythm. Heart sounds: Normal heart sounds. Pulmonary:      Effort: Pulmonary effort is normal. No respiratory distress. Breath sounds: Normal breath sounds. Abdominal:      Palpations: Abdomen is soft. There is no hepatomegaly or splenomegaly. Tenderness: There is no abdominal tenderness. Musculoskeletal:      Right lower leg: No edema. Left lower leg: No edema. Skin:     General: Skin is warm and dry. Coloration: Skin is not pale. Findings: No erythema or rash. Comments: Turgor normal   Neurological:      Mental Status: He is oriented to person, place, and time. Psychiatric:         Mood and Affect: Mood normal.         Behavior: Behavior normal.         Thought Content: Thought content normal.         Judgment: Judgment normal.              ASSESSMENT PLAN      Diagnosis Orders   1. Essential hypertension, benign     2. Coronary artery disease due to lipid rich plaque  CBC with Auto Differential    Comprehensive Metabolic Panel   3. Stage 3a chronic kidney disease ([facility] [address]     4. Vitamin D deficiency     5. Mixed hyperlipidemia  LIPID PANEL   6. Elevated PSA  PSA, Prostatic Specific Antigen   7. Gross hematuria     8. Hyperglycemia  Hemoglobin A1C   9. Encounter for vitamin deficiency screening  Vitamin D 25 Hydroxy   10.  Tobacco abuse     Current blood pressure readings in the office or at home are acceptable and current antihypertensive medications as listed in the medication list require no change. There is no clinical evidence of coronary insufficiency or heart failure that requires any change in the treatment regimen and no changes in medications for cardiac conditions as listed in the medication list are necessary. Chronic kidney disease stable. Vitamin D levels will be monitored as needed and changes to medications related to vitamin D as listed in the medication list will be changed to maintain parameters as needed. Lipids will be monitored based upon levels requiring treatment and other cardiac risks. Medications for hyperlipidemia and hypertriglyceridemia as listed on the medication list will be changed as necessary to reach control parameters. He reports the cystoscopy has been done no lesions found. No further episodes of bleeding in the urine. No symptoms of elevated sugar. Tobacco abuse - Pt will not quit despite understanding of risks of continued tobacco use, including cancer, heart attacks, strokes or death. He did have follow-up with cardiology who recommended a carotid Doppler. That also recommended a beta-blocker but patient refused since he does not like to start new medicine. Follow-up 6 months. He is push mowing 1/2 acre yard and hoeing in the garden all day without symptoms. Patient should call the office immediately with new or ongoing signs or symptoms or worsening, or proceed to the emergency room. No changes in past medical history, past surgical history, social history, or family history were noted during the patient encounter unless specifically listed above. All updates of past medical history, past surgical history, social history, or family history were reviewed personally by me during the office visit. All problems listed in the assessment are stable unless noted otherwise. Medication profile reviewed personally by me during the visit.   Medication side effects and possible impairments from medications were discussed as applicable. This document was prepared by a combination of typing and transcription through a voice recognition software. Scribe attestation: Lana Spain RN, am scribing for and in the presence of Brijesh Estevez MD. Electronically signed by Marialuisa Rondon RN on 7/12/2022 at 7:54 AM      Provider attestation:     I, Dr. Xavier Espino, personally performed the services described in this documentation, as scribed by the above signed scribe in my presence, and it is both accurate and complete. I agree with the ROS and Past Histories independently gathered by the clinical support staff and the remaining scribed note accurately describes my personal service to the patient.       7/12/2022    8:02 AM

## 2022-07-13 LAB
ESTIMATED AVERAGE GLUCOSE: 116.9 MG/DL
HBA1C MFR BLD: 5.7 %

## 2022-07-28 ENCOUNTER — HOSPITAL ENCOUNTER (OUTPATIENT)
Dept: VASCULAR LAB | Age: 77
Discharge: HOME | End: 2022-07-28
Payer: MEDICARE

## 2022-07-28 DIAGNOSIS — R42 DIZZINESS: ICD-10-CM

## 2022-07-28 PROCEDURE — 93880 EXTRACRANIAL BILAT STUDY: CPT

## 2022-07-29 ENCOUNTER — TELEPHONE (OUTPATIENT)
Dept: CARDIOLOGY CLINIC | Age: 77
End: 2022-07-29

## 2022-07-30 PROBLEM — Z01.810 ENCOUNTER FOR PRE-OPERATIVE CARDIOVASCULAR CLEARANCE: Status: RESOLVED | Noted: 2022-06-30 | Resolved: 2022-07-30

## 2023-01-17 ENCOUNTER — TELEPHONE (OUTPATIENT)
Dept: FAMILY MEDICINE CLINIC | Age: 78
End: 2023-01-17

## 2023-01-17 NOTE — TELEPHONE ENCOUNTER
Yasmany(EC) called requesting to talked with PCP regarding pt and noticing some signs of possible dementia or alzheimer's. States that in the last 6 months he's noticed that pt is getting more forgetful, has missed some appts, and noticing some other concerns as well. Jeanne Godoy would like to discuss this with PCP before discussing with pt. States that when he's mentioned it to pt, he gets frustrated.  Call back Jeanne Godoy [phone]

## 2023-01-18 NOTE — TELEPHONE ENCOUNTER
The son shared with me his concerns about confusion in his father. I participated in active listening but did not offer any information regarding patient. The patient did miss his appointment with me on January 11. Please reach out to the patient telling him that we noticed that he missed his appointment on January 11 and we wanted to get him scheduled back in for his 6-month regular follow-up visit. It would be okay to schedule that visit somewhere mid February to the end of February.   Please let me know the time and date when that appointment is scheduled

## 2023-01-24 ENCOUNTER — TELEPHONE (OUTPATIENT)
Dept: CARDIOLOGY CLINIC | Age: 78
End: 2023-01-24

## 2023-01-24 NOTE — TELEPHONE ENCOUNTER
CARDIAC CLEARANCE REQUEST    What type of procedure are you having: prostate biopsy     Are you taking any blood thinners: plavix, asa     Type on anesthesia: MAC     When is your procedure scheduled for: 01/30/23    What physician is performing your procedure: Dr. Briscoe Salvage     Phone Number: [phone]    Fax number to send the letter: [phone]

## 2023-01-24 NOTE — LETTER
[address] [facility] [address] [facility],[address] [address]  Phone: [phone]  Fax: [phone]    Dr. Chris Juares        January 25, 2023    Ladan Porter 1945 is cleared for prostate biopsy and may hold aspirin and plavix for a week before procedure and restart when considered safe to do so by urologist.        If you have any questions or concerns, please don't hesitate to call. Sincerely,        Dr. Chris Juares.

## 2023-01-24 NOTE — TELEPHONE ENCOUNTER
[facility] called as well requesting a letterhead faxed to them too stating pt can hold plavix and aspirin for 7 days and cleared for prostate biopsy.  Please fax letter to [phone] and [phone]

## 2023-02-01 ENCOUNTER — OFFICE VISIT (OUTPATIENT)
Dept: CARDIOLOGY CLINIC | Age: 78
End: 2023-02-01
Payer: MEDICARE

## 2023-02-01 VITALS
HEART RATE: 68 BPM | BODY MASS INDEX: 24.53 KG/M2 | HEIGHT: 66 IN | WEIGHT: 152.6 LBS | SYSTOLIC BLOOD PRESSURE: 142 MMHG | DIASTOLIC BLOOD PRESSURE: 90 MMHG | OXYGEN SATURATION: 96 %

## 2023-02-01 DIAGNOSIS — Z72.0 TOBACCO ABUSE: Primary | ICD-10-CM

## 2023-02-01 DIAGNOSIS — I25.10 CORONARY ARTERY DISEASE DUE TO LIPID RICH PLAQUE: ICD-10-CM

## 2023-02-01 DIAGNOSIS — E78.5 HYPERLIPIDEMIA, UNSPECIFIED HYPERLIPIDEMIA TYPE: ICD-10-CM

## 2023-02-01 DIAGNOSIS — I25.83 CORONARY ARTERY DISEASE DUE TO LIPID RICH PLAQUE: ICD-10-CM

## 2023-02-01 DIAGNOSIS — I10 ESSENTIAL HYPERTENSION, BENIGN: ICD-10-CM

## 2023-02-01 PROCEDURE — 3080F DIAST BP >= 90 MM HG: CPT | Performed by: INTERNAL MEDICINE

## 2023-02-01 PROCEDURE — 99214 OFFICE O/P EST MOD 30 MIN: CPT | Performed by: INTERNAL MEDICINE

## 2023-02-01 PROCEDURE — 3077F SYST BP >= 140 MM HG: CPT | Performed by: INTERNAL MEDICINE

## 2023-02-01 PROCEDURE — 1123F ACP DISCUSS/DSCN MKR DOCD: CPT | Performed by: INTERNAL MEDICINE

## 2023-02-01 RX ORDER — CLOPIDOGREL BISULFATE 75 MG/1
TABLET ORAL
Qty: 90 TABLET | Refills: 3 | Status: SHIPPED | OUTPATIENT
Start: 2023-02-01

## 2023-02-01 RX ORDER — SIMVASTATIN 40 MG
TABLET ORAL
Qty: 90 TABLET | Refills: 3 | Status: SHIPPED | OUTPATIENT
Start: 2023-02-01

## 2023-02-01 RX ORDER — BLOOD PRESSURE TEST KIT
1 KIT MISCELLANEOUS ONCE
Qty: 1 KIT | Refills: 0 | Status: SHIPPED | OUTPATIENT
Start: 2023-02-01 | End: 2023-02-01

## 2023-02-01 RX ORDER — AMLODIPINE BESYLATE 10 MG/1
TABLET ORAL
Qty: 90 TABLET | Refills: 3 | Status: SHIPPED | OUTPATIENT
Start: 2023-02-01

## 2023-02-01 RX ORDER — NITROGLYCERIN 0.4 MG/1
0.4 TABLET SUBLINGUAL EVERY 5 MIN PRN
Qty: 25 TABLET | Refills: 1 | Status: SHIPPED | OUTPATIENT
Start: 2023-02-01

## 2023-02-01 NOTE — PROGRESS NOTES
[address] [facility]      Patient: Lyssa Emanuel  YOB: 1945  Date: 2/1/23     Subjective: Patient is being seen today for cardiology f/u CAD, s/p stents in LAD and RCA May 2005, HTN, and HLD; today denies any complaints. History of Present Illness:    Mr. Al Phillips is a 69 y.o.  male here today for routine f/u CAD s/p stents in LAD and RCA 5/05, HTN, and HLD. I have recommended he start low dose BB Toprol XL but he refuses. He anxious about new medications. Note US abdominal aorta 1/7/19  done at [facility] was negative. Most recent GXT nuc 5/08/2019 showing normal isotope at stress and rest, no ischemia or scarring (no sig change 5/12). EKG 05/31/22 SR, Right BBB, and old anterior infarct, no change from EKG 2019. His carotid doppler from 06/30/22 showed < 50% bilateral stenosis (no change 5/12)   Today he reports feeling good overall. He denies any weight gain, edema, palpitations, chest pain, shortness of breath, dizziness, and syncope. He has not had to use nitro. He is taking medications as prescribed, tolerating well. He states he is able to mow 1/2 acre with self propel mower. Denies any limitation with activity. He is able to complete all activities of daily living. He continues to smoke 2ppd and NO desire to quit. Weight is 152#, down 6# since 6/22.        Medications    Current Outpatient Medications:     simvastatin (ZOCOR) 40 MG tablet, Take one tablet by mouth every night, Disp: 90 tablet, Rfl: 3    amLODIPine (NORVASC) 10 MG tablet, Take one tablet by mouth daily, Disp: 90 tablet, Rfl: 3    clopidogrel (PLAVIX) 75 MG tablet, Take 1 tablet by mouth daily, Disp: 90 tablet, Rfl: 3    nitroGLYCERIN (NITROSTAT) 0.4 MG SL tablet, Place 1 tablet under the tongue every 5 minutes as needed for Chest pain, Disp: 25 tablet, Rfl: 1    tadalafil (CIALIS) 20 MG tablet, Take 1 tablet by mouth as needed for Erectile Dysfunction, Disp: 10 tablet, Rfl: 3    [employer]S VIT D 5000 HIGH-POTENCY 5000 UNITS CAPS capsule, TAKE ONE CAPSULE BY MOUTH DAILY, Disp: 30 capsule, Rfl: 11    aspirin 81 MG EC tablet, Take 81 mg by mouth daily. , Disp: , Rfl:     Review of Systems:  Cardiac: No chest pain and no palpitations. Respiratory: No new SOB, PND, orthopnea or cough. Neurological: No syncope or TIA. General: No major weight gain or loss, no fatigue, no weakness, no fever. Musculoskeletal: No new muscle or joint pain. GI: No change in bowel habits. Psychiatric: No anxiety, no panic, no insomnia, no depression. Skin: No rash. Lab Results   Component Value Date/Time    CHOL 134 07/12/2022 07:59 AM     No results for input(s): AST, ALT, ALB, BILIDIR, BILITOT, ALKPHOS in the last 72 hours. Vitals:    02/01/23 1114 02/01/23 1116   BP: (!) 140/90 (!) 142/90   Pulse: 68    SpO2: 96%    Weight: 152 lb 9.6 oz (69.2 kg)    Height: 5' 6\" (1.676 m)       Physical Examination  Constitutional: He is oriented to person, place and time. He appears well developed and well nourished. Head: Normocephalic and atraumatic. Neck: Neck supple. No JVD present. Carotid bruit is not present. No mass and no thyromegaly present. Cardiovascular: Normal rate, regular rhythm, normal heart sounds and intact distal pulses. Exam reveals no gallop and no friction rub. No murmur heard. Pulmonary/Chest: Effort normal and diffusely soft breathe sounds. No respiratory distress. He has no wheezes, rhonchi or rales. Abdominal: Soft, non-tender. Bowel sounds and aorta are normal.  He exhibits no organomegaly, mass or bruit. Extremities: No edema, cyanosis or clubbing. Neurological: He is alert, and oriented to person, place and time. He has normal reflexes. No cranial nerve deficit. Coordination normal.  Skin: Skin is warm and dry. There is no rash or diaphoresis. Phychiatric: He has a normal mood and affect.  He speech is normal and behavior is normal.  Judgement, cognition and memory are normal.    No components found for: CHLPLTotal Cholesterol 139      10/28/2014   Lab Results   Component Value Date    TRIG 82 07/12/2022    TRIG 136 07/07/2021    TRIG 140 01/14/2020     Lab Results   Component Value Date    HDL 36 (L) 07/12/2022    HDL 28 (L) 07/07/2021    HDL 30 (L) 01/14/2020     Lab Results   Component Value Date    LDLCALC 82 07/12/2022    LDLCALC 77 07/07/2021    LDLCALC 66 01/14/2020     Lab Results   Component Value Date    LABVLDL 16 07/12/2022    LABVLDL 27 07/07/2021    LABVLDL 28 01/14/2020       Patient Active Problem List   Diagnoses   1. HTN (hypertension):  Stable and well controlled and will continue present medical regimen.      2. CAD (coronary artery disease): s/p stents in LAD and RCA in May 2005. Most recent GXT nuc 5/08/2019 showing normal isotope at stress and rest, no ischemia or scarring. There are no concerning symptoms for angina currently. Continue current regimen.      3. Hyperlipidemia:  Most recent labs 7/12/22 see results above I personally reviewed.  LDL close but not at goal (82) and chronically lower HDL. Continue present zocor 40mg qhs.  Repeat Lipid/Liver panels at PCP next week and would switch to crestor if LDL still not at goal. Would not recommend increasing zocor to 80mg due to higher potential incidence rhabdo/muscle inflammation.       4.   Tobacco abuse: He is still not interested in quitting and will NOT quit.      5.   Dizziness: Resolved and carotid US unremarkable in 6/22.    PLAN:  1. Recommend monitor blood pressure at home once daily and keep a log. Please call into office blood pressure and heart rate in 2 weeks with review.  ~ goal 130/80  ~ recommend blood pressure machine be checked for accuracy with primary care provider CHAVA BARRAGAN MD at next office visit.   2.I recommend that the patient continue their currently prescribed medications. Their drug modifiable risk factors appear to be well controlled. I will continue to address the need/dosing of  medications in future visits. 3. Continue taking simvastatin (Zocor) 40 mg daily  ~ discussed if LDL is not at goal (less than 70) would discontinue medication and start alternative statin medication. 4. Please call office when you have blood work completed so we can reach out to Elvia Nicolas MD office for results. Please ask PCP to draw fasting lipid panel. 5. Tobacco Cessation   6. Follow up in 6 months. Scribe's attestation: This note was scribed in the presence of Román Luevano by Mojgan Jules RN     I, Dr. Spencer Dixon, personally performed the services described in this documentation, as scribed by the above signed scribe in my presence. It is both accurate and complete to my knowledge. I agree with the details independently gathered by the clinical support staff, while the remaining scribed note accurately describes my personal service to the patient. Cost of prescription medications and patient compliance have been reviewed with patient. All questions answered. Dr. Paolo Mcpherson.  Isadora Guzman

## 2023-02-01 NOTE — PATIENT INSTRUCTIONS
PLAN:  1. Recommend monitor blood pressure at home once daily and keep a log. Please call into office blood pressure and heart rate in 2 weeks with review.  ~ goal 130/80  ~ recommend blood pressure machine be checked for accuracy with primary care provider Hammad Lew MD at next office visit. 2.I recommend that the patient continue their currently prescribed medications. Their drug modifiable risk factors appear to be well controlled. I will continue to address the need/dosing of medications in future visits. 3. Continue taking simvastatin (Zocor) 40 mg daily  ~ discussed if LDL is not at goal less than 70 would discontinue medication and start another statin medication. 4. Please call office when you have blood work completed so we can reach out to Hammad Lew MD office for results. Please ask PCP to draw fasting lipid panel. 5. Follow up in 6 months.

## 2023-02-01 NOTE — PROGRESS NOTES
2016 [facility]      Patient: Sam Michaud  YOB: 1945  Date: 2/1/23     Subjective: Patient is being seen today for cardiology follow up OF CAD, s/p stents in LAD and RCA May 2005, HTN, and HLD; denies any complaints. History of Present Illness:    Mr. Valente Lance is a 69 y.o.  male here today for routine f/u CAD s/p stents in LAD and RCA in May 2005, HTN, and HLD. Last OV 4/19. His carotid doppler from 5/12 showed no hemodynamically significant lesion (no change 12/10 study). I have recommended that he start a low dose BB Toprol XL but he refuses. He is fearful of starting new medications. Note US abdominal aorta 1/7/19  done at [facility] was negative. Since last OV he had most recent GXT nuc 5/08/2019 showing normal isotope at stress and rest, no ischemia or scarring (no sig change 5/12). Most recent EKG 05/31/22 SR, Right BBB, and old anterior infarct, no change from EKG 2019. On 06/30/22 he feels good overall and denies any problems with daily activities. He states he is currently smoking and does not want to quit. Patient currently denies any weight gain, edema, palpitations, chest pain, shortness of breath, dizziness, and syncope. He is taking all medications as prescribed, tolerating well. Denies recent issues with bleeding or bruising. 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 On exam today he had dizziness with stethoscope placement checking right carotid artery. He continues to smoke 2ppd and NO desire to quit. Weight is #158 lbs, down 7# since 04/2019. His carotid doppler from 06/30/22 showed <50% bilateral stenosis.    Today        Medications    Current Outpatient Medications:     simvastatin (ZOCOR) 40 MG tablet, Take one tablet by mouth every night, Disp: 90 tablet, Rfl: 3    amLODIPine (NORVASC) 10 MG tablet, Take one tablet by mouth daily, Disp: 90 tablet, Rfl: 3    clopidogrel (PLAVIX) 75 MG tablet, Take 1 tablet by mouth daily, Disp: 90 tablet, Rfl: 3    nitroGLYCERIN (NITROSTAT) 0.4 MG SL tablet, Place 1 tablet under the tongue every 5 minutes as needed for Chest pain, Disp: 25 tablet, Rfl: 1    tadalafil (CIALIS) 20 MG tablet, Take 1 tablet by mouth as needed for Erectile Dysfunction, Disp: 10 tablet, Rfl: 3    [employer]S VIT D 5000 HIGH-POTENCY 5000 UNITS CAPS capsule, TAKE ONE CAPSULE BY MOUTH DAILY, Disp: 30 capsule, Rfl: 11    aspirin 81 MG EC tablet, Take 81 mg by mouth daily. , Disp: , Rfl:     Review of Systems:  Cardiac: No chest pain and no palpitations. Respiratory: No new SOB, PND, orthopnea or cough. Neurological: No syncope or TIA. General: No major weight gain or loss, no fatigue, no weakness, no fever. Musculoskeletal: No new muscle or joint pain. GI: No change in bowel habits. Psychiatric: No anxiety, no panic, no insomnia, no depression. Skin: No rash. Lab Results   Component Value Date/Time    CHOL 134 07/12/2022 07:59 AM     No results for input(s): AST, ALT, ALB, BILIDIR, BILITOT, ALKPHOS in the last 72 hours. There were no vitals filed for this visit. Physical Examination  Constitutional: He is oriented to person, place and time. He appears well developed and well nourished. Head: Normocephalic and atraumatic. Neck: Neck supple. No JVD present. Carotid bruit is not present. No mass and no thyromegaly present. Cardiovascular: Normal rate, regular rhythm, normal heart sounds and intact distal pulses. Exam reveals no gallop and no friction rub. No murmur heard. Pulmonary/Chest: Effort normal and breath sounds normal.  No respiratory distress. He has no wheezes, rhonchi or rales. Abdominal: Soft, non-tender. Bowel sounds and aorta are normal.  He exhibits no organomegaly, mass or bruit. Extremities: No edema, cyanosis or clubbing. Neurological: He is alert, and oriented to person, place and time. He has normal reflexes. No cranial nerve deficit. Coordination normal.  Skin: Skin is warm and dry.   There is no rash or diaphoresis. Phychiatric: He has a normal mood and affect. He speech is normal and behavior is normal.  Judgement, cognition and memory are normal.    No components found for: CHLPLTotal Cholesterol 139      10/28/2014   Lab Results   Component Value Date    TRIG 82 07/12/2022    TRIG 136 07/07/2021    TRIG 140 01/14/2020     Lab Results   Component Value Date    HDL 36 (L) 07/12/2022    HDL 28 (L) 07/07/2021    HDL 30 (L) 01/14/2020     Lab Results   Component Value Date    LDLCALC 82 07/12/2022    LDLCALC 77 07/07/2021    LDLCALC 66 01/14/2020     Lab Results   Component Value Date    LABVLDL 16 07/12/2022    LABVLDL 27 07/07/2021    LABVLDL 28 01/14/2020       Patient Active Problem List   Diagnoses   1. HTN (hypertension):  Stable and well controlled and will continue present medical regimen. 2. CAD (coronary artery disease): s/p stents in LAD and RCA in May 2005. Most recent GXT nuc 5/08/2019 showing normal isotope at stress and rest, no ischemia or scarring. There are no concerning symptoms for angina currently. Continue current regimen. 3. Hyperlipidemia:  Most recent labs 7/7/21 see results above I personally reviewed. Well controlled and will continue current medical regimen. At goal except for lower HDL which doesn't show definite clinical benefit treating. Continue present zocor. Repeat Lipid/Liver panels near future. 4.   Tobacco abuse: He is still not interested in quitting and will NOT quit. 5.   Dizziness: Note he became very dizzy when I palpated right carotid. Given hx CAD, tobacco abuse,and exam  with symptoms I am concerned about carotid artery disease and will order doppler US. PLAN:  1. Discussion of importance not to take Cialis and Nitroglycerin within 24 hours of one another as can lead to medical emergency. 2. I recommend that the patient continue their currently prescribed medications. Their drug modifiable risk factors appear to be well controlled.  I will continue to address the need/dosing of medications in future visits. 3. Tobacco cessation high risk factor to heart attack or stoke  ~1-800-QUIT-NOW  4. Order lab work ~ lipid panel and liver function test  ~ Please be fasting for 8 hrs prior   5. Order carotid artery ultrasound ~ dizziness  6. Follow up in 6 months or sooner if possible at [facility].         ***      ***    Cost of prescription medications and patient compliance have been reviewed with patient. All questions answered. Dr. Jose Price.  Jaspreet Aver

## 2023-02-02 ENCOUNTER — HOSPITAL ENCOUNTER (EMERGENCY)
Age: 78
Discharge: HOME | End: 2023-02-02
Attending: EMERGENCY MEDICINE
Payer: MEDICARE

## 2023-02-02 ENCOUNTER — TELEPHONE (OUTPATIENT)
Dept: CARDIOLOGY CLINIC | Age: 78
End: 2023-02-02

## 2023-02-02 VITALS
RESPIRATION RATE: 18 BRPM | BODY MASS INDEX: 24.43 KG/M2 | HEART RATE: 61 BPM | HEIGHT: 66 IN | TEMPERATURE: 97.5 F | DIASTOLIC BLOOD PRESSURE: 84 MMHG | SYSTOLIC BLOOD PRESSURE: 188 MMHG | WEIGHT: 152 LBS | OXYGEN SATURATION: 96 %

## 2023-02-02 DIAGNOSIS — I10 PRIMARY HYPERTENSION: Primary | ICD-10-CM

## 2023-02-02 PROCEDURE — 6370000000 HC RX 637 (ALT 250 FOR IP): Performed by: EMERGENCY MEDICINE

## 2023-02-02 PROCEDURE — 99283 EMERGENCY DEPT VISIT LOW MDM: CPT

## 2023-02-02 RX ORDER — METOPROLOL SUCCINATE 25 MG/1
12.5 TABLET, EXTENDED RELEASE ORAL DAILY
Qty: 45 TABLET | Refills: 1 | Status: SHIPPED | OUTPATIENT
Start: 2023-02-02

## 2023-02-02 RX ADMIN — METOPROLOL TARTRATE 25 MG: 25 TABLET, FILM COATED ORAL at 11:35

## 2023-02-02 ASSESSMENT — PAIN - FUNCTIONAL ASSESSMENT: PAIN_FUNCTIONAL_ASSESSMENT: NONE - DENIES PAIN

## 2023-02-02 ASSESSMENT — ENCOUNTER SYMPTOMS: COUGH: 0

## 2023-02-02 ASSESSMENT — LIFESTYLE VARIABLES: HOW OFTEN DO YOU HAVE A DRINK CONTAINING ALCOHOL: NEVER

## 2023-02-02 NOTE — ED NOTES
AVS provided and reviewed with the patient and son. The patient and son verbalized understanding of care at home, follow up care, and emergent symptoms to return for. No questions or concerns verbalized at this time. The patient is alert, oriented, stable, and ambulatory out of the department at the time of discharge.        Cherylene Africa, RN  02/02/23 [id number]

## 2023-02-02 NOTE — TELEPHONE ENCOUNTER
FYI   Pts son called mhi stating he seen smm in office yesterday and he advised him to get a bp cuff, stated this morning they took bp 3 times and once was by a EMT and stated it was 190/116, 210/110 and 212/118, per PMKW advised pt he needs to go to ED

## 2023-02-02 NOTE — ED PROVIDER NOTES
[facility]      Pt Name: Nanci Terrell  MRN: [id number]  [place] 1945  Date of evaluation: 2/2/2023  Provider: Oxana East MD    CHIEF COMPLAINT       Chief Complaint   Patient presents with    Hypertension         HISTORY OF PRESENT ILLNESS   (Location/Symptom, Timing/Onset, Context/Setting, Quality, Duration, Modifying Factors, Severity)  Note limiting factors. Nanci Terrell is a 69 y.o. male who presents to the emergency department     Patient is here with his son without any symptomatology  He did see Dr. Steffi Carrasco actually just yesterday    The history is provided by the patient and a relative. Nursing Notes were reviewed. REVIEW OF SYSTEMS    (2-9 systems for level 4, 10 or more for level 5)     Review of Systems   Respiratory:  Negative for cough. Cardiovascular:  Negative for chest pain. Neurological:  Negative for light-headedness and headaches. All other systems reviewed and are negative. Except as noted above the remainder of the review of systems was reviewed and negative. PAST MEDICAL HISTORY     Past Medical History:   Diagnosis Date    CAD (coronary artery disease)     Carotid bruit     ED (erectile dysfunction)     Hyperlipidemia     Hypertension     Tobacco abuse          SURGICAL HISTORY       Past Surgical History:   Procedure Laterality Date    CORONARY ANGIOPLASTY WITH STENT PLACEMENT           CURRENT MEDICATIONS       Previous Medications    AMLODIPINE (NORVASC) 10 MG TABLET    Take one tablet by mouth daily    ASPIRIN 81 MG EC TABLET    Take 81 mg by mouth daily.       BLOOD PRESSURE KIT    1 Device by Does not apply route once for 1 dose    CLOPIDOGREL (PLAVIX) 75 MG TABLET    Take 1 tablet by mouth daily    CVS VIT D 5000 HIGH-POTENCY 5000 UNITS CAPS CAPSULE    TAKE ONE CAPSULE BY MOUTH DAILY    NITROGLYCERIN (NITROSTAT) 0.4 MG SL TABLET    Place 1 tablet under the tongue every 5 minutes as needed for Chest pain    SIMVASTATIN (ZOCOR) 40 MG TABLET    Take one tablet by mouth every night    TADALAFIL (CIALIS) 20 MG TABLET    Take 1 tablet by mouth as needed for Erectile Dysfunction       ALLERGIES     Lisinopril and Ampicillin    FAMILY HISTORY     No family history on file. SOCIAL HISTORY       Social History     Socioeconomic History    Marital status:    Tobacco Use    Smoking status: Every Day     Packs/day: 2.00     Years: 60.00     Pack years: 120.00     Types: Cigarettes     Start date: 6/18/1957    Smokeless tobacco: Never   Vaping Use    Vaping Use: Never used   Substance and Sexual Activity    Alcohol use: No     Alcohol/week: 0.0 standard drinks    Drug use: No    Sexual activity: Yes     Partners: Female     Social Determinants of Health     Financial Resource Strain: Low Risk     Difficulty of Paying Living Expenses: Not hard at all   Food Insecurity: No Food Insecurity    Worried About [employer] in the Last Year: Never true    [facility] [employer] in the Last Year: Never true       SCREENINGS    Santi Coma Scale  Eye Opening: Spontaneous  Best Verbal Response: Oriented  Best Motor Response: Obeys commands  [place] Coma Scale Score: 15          PHYSICAL EXAM    (up to 7 for level 4, 8 or more for level 5)     ED Triage Vitals [02/02/23 1051]   BP Temp Temp Source Heart Rate Resp SpO2 Height Weight   (!) 220/99 97.5 °F (36.4 °C) Oral 65 18 97 % 5' 6\" (1.676 m) 152 lb (68.9 kg)       Physical Exam  Vitals and nursing note reviewed. HENT:      Nose: Nose normal.   Cardiovascular:      Rate and Rhythm: Normal rate. Pulses: Normal pulses. Neurological:      Mental Status: He is alert.        DIAGNOSTIC RESULTS     EKG: All EKG's are interpreted by the Emergency Department Physician who either signs or Co-signs this chart in the absence of a cardiologist.        RADIOLOGY:   Non-plain film images such as CT, Ultrasound and MRI are read by the radiologist. Rich Delacruz radiographic images are visualized and preliminarily interpreted by the emergency physician with the below findings:        Interpretation per the Radiologist below, if available at the time of this note:    No orders to display           LABS:  No results found for this visit on 02/02/23. EMERGENCY DEPARTMENT COURSE and DIFFERENTIAL DIAGNOSIS/MDM:     Vitals:    02/02/23 1051 02/02/23 1100 02/02/23 1114 02/02/23 1126   BP: (!) 220/99  (!) 195/77 (!) 188/84   Pulse: 65 61 62 61   Resp: 18   18   Temp: 97.5 °F (36.4 °C)      TempSrc: Oral      SpO2: 97%   96%   Weight: 152 lb (68.9 kg)      Height: 5' 6\" (1.676 m)              MDM  Historians: Patient is here with his son who provided history  Limitations of history patient is somewhat I think cognitively slow. And is not totally aware or has a good grasp of his medical conditions including his tobacco abuse which she does not want to give up    Medical appropriate history patient presents without symptoms of headache chest pain or other symptoms apparently he was told yesterday to start checking his blood pressure he got several high readings today which scared him and so he had his son drive him out here  He denies any symptoms  Medically appropriate physical exam his blood pressure is 188/80 4 repeat was 200 over approximately 100 lung sounds are clear card exam is normal abdomen is soft no neurological symptoms  Differential diagnosis acute high blood pressure acute hypertension  Conditions and morbidities patient does have a history of cardiac stents cardiac disease  Has history of hypertension in the past hyperlipidemia he is anticoagulated on Plavix  Records reviewed I did review with Dr. Ricardo Troncoso external record yesterday. Very thorough. Meds reviewed he is on Norvasc 10  No EKG x-rays or labs are indicated he is in agreement with this. Medications given Dr. Ivonne Viera note yesterday suggested meta propyl all beta-blocker be started.   Therefore I did start him on metoprolol 12.5 extended release a small dose his heart rate is just a little slow at 61 so I thought I had start with a baby dose at this point. Visit summary this patient presents with hypertension but without symptoms he is reassured he is advised to check his blood pressure for least 1 week and then started on meta propyl all 12.5 twice daily social deterrence could be his cognitive slowness  Prescriptions given meta propanol 25 ended release which she is to take one half a day      REASSESSMENT          CRITICAL CARE TIME     CONSULTS:  None      PROCEDURES:     Procedures    MEDICATIONS GIVEN THIS VISIT:  Medications   metoprolol tartrate (LOPRESSOR) tablet 25 mg (has no administration in time range)        FINAL IMPRESSION      1. Primary hypertension            DISPOSITION/PLAN   DISPOSITION Decision To Discharge 02/02/2023 11:24:23 AM      PATIENT REFERRED TO:  Vikash Montiel MD  [address] [place] [address]. [place] [address]  [phone]    In 1 week        DISCHARGE MEDICATIONS:  New Prescriptions    METOPROLOL SUCCINATE (TOPROL XL) 25 MG EXTENDED RELEASE TABLET    Take 0.5 tablets by mouth daily       Controlled Substances Monitoring  No flowsheet data found. (Please note that portions of this note were completed with a voice recognition program.  Efforts were made to edit the dictations but occasionally words are mis-transcribed.)    Patient was advised to return to the Emergency Department if there was any worsening.     Brynn Fowler MD (electronically signed)  Attending Emergency Physician         Carlos Rees MD  02/02/23 8577

## 2023-02-02 NOTE — TELEPHONE ENCOUNTER
OK. Sounds appropriate to have BP rechecked in ER and may need IV meds to lower. Would have his home BP cuff checked at hospital as well. Make sure did follow through with ER and let me know what happened to him. Thanks.

## 2023-02-02 NOTE — DISCHARGE INSTRUCTIONS
Start metoprolol XL 25 mg extended release tablet take 0.5 (one half of the tablet) once a day starting in the morning    Check your blood pressure for another 1 to 2 weeks and follow-up with your primary care physician

## 2023-02-07 ENCOUNTER — OFFICE VISIT (OUTPATIENT)
Dept: FAMILY MEDICINE CLINIC | Age: 78
End: 2023-02-07

## 2023-02-07 VITALS
OXYGEN SATURATION: 94 % | BODY MASS INDEX: 24.53 KG/M2 | SYSTOLIC BLOOD PRESSURE: 189 MMHG | WEIGHT: 152 LBS | HEART RATE: 79 BPM | DIASTOLIC BLOOD PRESSURE: 99 MMHG

## 2023-02-07 DIAGNOSIS — R73.03 PRE-DIABETES: ICD-10-CM

## 2023-02-07 DIAGNOSIS — D69.6 ACQUIRED THROMBOCYTOPENIA (HCC): ICD-10-CM

## 2023-02-07 DIAGNOSIS — R97.20 ELEVATED PSA: ICD-10-CM

## 2023-02-07 DIAGNOSIS — T46.4X5A ACE INHIBITOR-AGGRAVATED ANGIOEDEMA, INITIAL ENCOUNTER: ICD-10-CM

## 2023-02-07 DIAGNOSIS — I10 UNCONTROLLED HYPERTENSION: ICD-10-CM

## 2023-02-07 DIAGNOSIS — N18.31 STAGE 3A CHRONIC KIDNEY DISEASE (HCC): ICD-10-CM

## 2023-02-07 DIAGNOSIS — T78.3XXA ACE INHIBITOR-AGGRAVATED ANGIOEDEMA, INITIAL ENCOUNTER: ICD-10-CM

## 2023-02-07 DIAGNOSIS — Z00.00 INITIAL MEDICARE ANNUAL WELLNESS VISIT: Primary | ICD-10-CM

## 2023-02-07 DIAGNOSIS — E78.2 MIXED HYPERLIPIDEMIA: ICD-10-CM

## 2023-02-07 DIAGNOSIS — Z72.0 TOBACCO ABUSE: ICD-10-CM

## 2023-02-07 DIAGNOSIS — I25.10 CORONARY ARTERY DISEASE DUE TO LIPID RICH PLAQUE: ICD-10-CM

## 2023-02-07 DIAGNOSIS — I25.83 CORONARY ARTERY DISEASE DUE TO LIPID RICH PLAQUE: ICD-10-CM

## 2023-02-07 DIAGNOSIS — I10 ESSENTIAL HYPERTENSION, BENIGN: ICD-10-CM

## 2023-02-07 DIAGNOSIS — R41.3 MEMORY CHANGES: ICD-10-CM

## 2023-02-07 DIAGNOSIS — R41.89 COGNITIVE IMPAIRMENT: ICD-10-CM

## 2023-02-07 LAB
ATYPICAL LYMPHOCYTE RELATIVE PERCENT: 2 % (ref 0–6)
BANDED NEUTROPHILS RELATIVE PERCENT: 11 % (ref 0–7)
BASOPHILS ABSOLUTE: 0 K/UL (ref 0–0.2)
BASOPHILS RELATIVE PERCENT: 0 %
EOSINOPHILS ABSOLUTE: 0.6 K/UL (ref 0–0.6)
EOSINOPHILS RELATIVE PERCENT: 11 %
FOLATE: 12.34 NG/ML (ref 4.78–24.2)
HCT VFR BLD CALC: 46.6 % (ref 40.5–52.5)
HEMOGLOBIN: 15.2 G/DL (ref 13.5–17.5)
LYMPHOCYTES ABSOLUTE: 1.1 K/UL (ref 1–5.1)
LYMPHOCYTES RELATIVE PERCENT: 19 %
MCH RBC QN AUTO: 30.6 PG (ref 26–34)
MCHC RBC AUTO-ENTMCNC: 32.5 G/DL (ref 31–36)
MCV RBC AUTO: 93.9 FL (ref 80–100)
MONOCYTES ABSOLUTE: 0.2 K/UL (ref 0–1.3)
MONOCYTES RELATIVE PERCENT: 3 %
NEUTROPHILS ABSOLUTE: 3.3 K/UL (ref 1.7–7.7)
NEUTROPHILS RELATIVE PERCENT: 54 %
PDW BLD-RTO: 15.6 % (ref 12.4–15.4)
PLATELET # BLD: 148 K/UL (ref 135–450)
PLATELET SLIDE REVIEW: ADEQUATE
PMV BLD AUTO: 9.7 FL (ref 5–10.5)
RBC # BLD: 4.96 M/UL (ref 4.2–5.9)
SLIDE REVIEW: ABNORMAL
VITAMIN B-12: 228 PG/ML (ref 211–911)
WBC # BLD: 5 K/UL (ref 4–11)

## 2023-02-07 RX ORDER — METOPROLOL SUCCINATE 25 MG/1
25 TABLET, EXTENDED RELEASE ORAL DAILY
Qty: 45 TABLET | Refills: 1
Start: 2023-02-07

## 2023-02-07 SDOH — ECONOMIC STABILITY: INCOME INSECURITY: HOW HARD IS IT FOR YOU TO PAY FOR THE VERY BASICS LIKE FOOD, HOUSING, MEDICAL CARE, AND HEATING?: NOT HARD AT ALL

## 2023-02-07 SDOH — ECONOMIC STABILITY: HOUSING INSECURITY
IN THE LAST 12 MONTHS, WAS THERE A TIME WHEN YOU DID NOT HAVE A STEADY PLACE TO SLEEP OR SLEPT IN A SHELTER (INCLUDING NOW)?: NO

## 2023-02-07 SDOH — ECONOMIC STABILITY: FOOD INSECURITY: WITHIN THE PAST 12 MONTHS, YOU WORRIED THAT YOUR FOOD WOULD RUN OUT BEFORE YOU GOT MONEY TO BUY MORE.: NEVER TRUE

## 2023-02-07 SDOH — ECONOMIC STABILITY: FOOD INSECURITY: WITHIN THE PAST 12 MONTHS, THE FOOD YOU BOUGHT JUST DIDN'T LAST AND YOU DIDN'T HAVE MONEY TO GET MORE.: NEVER TRUE

## 2023-02-07 ASSESSMENT — PATIENT HEALTH QUESTIONNAIRE - PHQ9
2. FEELING DOWN, DEPRESSED OR HOPELESS: 0
SUM OF ALL RESPONSES TO PHQ QUESTIONS 1-9: 0
1. LITTLE INTEREST OR PLEASURE IN DOING THINGS: 0
SUM OF ALL RESPONSES TO PHQ QUESTIONS 1-9: 0
SUM OF ALL RESPONSES TO PHQ9 QUESTIONS 1 & 2: 0
SUM OF ALL RESPONSES TO PHQ QUESTIONS 1-9: 0
SUM OF ALL RESPONSES TO PHQ QUESTIONS 1-9: 0

## 2023-02-07 ASSESSMENT — LIFESTYLE VARIABLES
HOW MANY STANDARD DRINKS CONTAINING ALCOHOL DO YOU HAVE ON A TYPICAL DAY: PATIENT DOES NOT DRINK
HOW OFTEN DO YOU HAVE A DRINK CONTAINING ALCOHOL: NEVER

## 2023-02-07 NOTE — PROGRESS NOTES
Medicare Annual Wellness Visit    Scott Cancino is here for Hypertension and Medicare AWV    Assessment & Plan   Initial Medicare annual wellness visit  Acquired thrombocytopenia ([facility] [address]  Stage 3a chronic kidney disease ([facility] [address]  Essential hypertension, benign  Uncontrolled hypertension  Memory changes  -     CBC with Auto Differential  -     Basic Metabolic Panel  -     Hemoglobin A1C  -     CT HEAD WO CONTRAST; Future  -     Vitamin B12 & Folate  -     TSH with Reflex  -     T4, Free  Mixed hyperlipidemia  -     LIPID PANEL  Pre-diabetes  -     Hemoglobin A1C  Coronary artery disease due to lipid rich plaque  Tobacco abuse  ACE inhibitor-aggravated angioedema, initial encounter  Elevated PSA  Cognitive impairment      Recommendations for Preventive Services Due: see orders and patient instructions/AVS.  Recommended screening schedule for the next 5-10 years is provided to the patient in written form: see Patient Instructions/AVS.     No follow-ups on file. Subjective       Patient's complete Health Risk Assessment and screening values have been reviewed and are found in Flowsheets. The following problems were reviewed today and where indicated follow up appointments were made and/or referrals ordered.     Positive Risk Factor Screenings with Interventions:                 Weight and Activity:  Physical Activity: Inactive    Days of Exercise per Week: 0 days    Minutes of Exercise per Session: 0 min     On average, how many days per week do you engage in moderate to strenuous exercise (like a brisk walk)?: 0 days  Have you lost any weight without trying in the past 3 months?: (!) Yes        Safety:  Do you have either shower bars, grab bars, non-slip mats or non-slip surfaces in your shower or bathtub?: (!) No    Tobacco Use:  Tobacco Use: High Risk    Smoking Tobacco Use: Every Day    Smokeless Tobacco Use: Never    Passive Exposure: Not on file     E-cigarette/Vaping       Questions Responses E-cigarette/Vaping Use Never User    Start Date     Passive Exposure No    Quit Date     Counseling Given No    Comments           Interventions:  Patient declined any further intervention or treatment            Objective   Vitals:    02/07/23 0754 02/07/23 0755   BP: (!) 191/105 (!) 189/99   Pulse: 79    SpO2: 94%    Weight: 152 lb (68.9 kg)       Body mass index is 24.53 kg/m². Allergies   Allergen Reactions    Lisinopril Other (See Comments)     Tongue swelling    Ampicillin      Prior to Visit Medications    Medication Sig Taking? Authorizing Provider   metoprolol succinate (TOPROL XL) 25 MG extended release tablet Take 0.5 tablets by mouth daily Yes Tyson Green MD   simvastatin (ZOCOR) 40 MG tablet Take one tablet by mouth every night Yes Nessa Martinez MD   amLODIPine (NORVASC) 10 MG tablet Take one tablet by mouth daily Yes Nessa Martinez MD   clopidogrel (PLAVIX) 75 MG tablet Take 1 tablet by mouth daily Yes Nessa Martinez MD   nitroGLYCERIN (NITROSTAT) 0.4 MG SL tablet Place 1 tablet under the tongue every 5 minutes as needed for Chest pain Yes Nessa Martinez MD   tadalafil (CIALIS) 20 MG tablet Take 1 tablet by mouth as needed for Erectile Dysfunction Yes Earnest Ibarra MD   CVS VIT D 5000 HIGH-POTENCY 5000 UNITS CAPS capsule TAKE ONE CAPSULE BY MOUTH DAILY Yes Earnest Ibarra MD   aspirin 81 MG EC tablet Take 81 mg by mouth daily.    Yes Historical Provider, MD   Blood Pressure KIT 1 Device by Does not apply route once for 1 dose  Nessa Martinez MD       [facility] (Including outside providers/suppliers regularly involved in providing care):   Patient Care Team:  Earnest Ibarra MD as PCP - Yahaira Lal MD as PCP - [facility]  Nessa Martinez MD as Consulting Physician (Cardiology)     Reviewed and updated this visit:  Tobacco  Allergies  Meds  Problems  Med Hx  Surg Hx  Soc Hx  Fam Hx          I, Frank Young Julita Wells LPN, 6/4/2410, performed the documented evaluation under the direct supervision of the attending physician. Jennifer Jennings LPN     This encounter was performed under my, Jose Reynolds, direct supervision, 2/7/2023.

## 2023-02-07 NOTE — PATIENT INSTRUCTIONS
Begin taking a whole tablet of metoprolol daily     Call [phone] to schedule for the CT brain      Patient should call the office immediately with new or ongoing signs or symptoms or worsening, or proceed to the emergency room. If you are on medications which could impair your senses, you are at risk of weakness, falls, dizziness, or drowsiness. You should be careful during activities which could place you at risk of harm, such as climbing, using stairs, operating machinery, or driving vehicles. If you feel you cannot safely do these activities, you should request others to help you, or avoid the activities altogether. If you are drowsy for any other reason, you should use the same precautions as listed above. Web Address for Advance Directive:    [employer]     Personalized Preventive Plan for Scott Cancino - 2/7/2023  Medicare offers a range of preventive health benefits. Some of the tests and screenings are paid in full while other may be subject to a deductible, co-insurance, and/or copay. Some of these benefits include a comprehensive review of your medical history including lifestyle, illnesses that may run in your family, and various assessments and screenings as appropriate. After reviewing your medical record and screening and assessments performed today your provider may have ordered immunizations, labs, imaging, and/or referrals for you. A list of these orders (if applicable) as well as your Preventive Care list are included within your After Visit Summary for your review. Other Preventive Recommendations:    A preventive eye exam performed by an eye specialist is recommended every 1-2 years to screen for glaucoma; cataracts, macular degeneration, and other eye disorders. A preventive dental visit is recommended every 6 months. Try to get at least 150 minutes of exercise per week or 10,000 steps per day on a pedometer .   Order or download the FREE \"Exercise & Physical Activity: Your Everyday Guide\" from [employer]. Call 1-501.522.7236 or search [employer] online. You need 3155-3310 mg of calcium and 9696-1036 IU of vitamin D per day. It is possible to meet your calcium requirement with diet alone, but a vitamin D supplement is usually necessary to meet this goal.  When exposed to the sun, use a sunscreen that protects against both UVA and UVB radiation with an SPF of 30 or greater. Reapply every 2 to 3 hours or after sweating, drying off with a towel, or swimming. Always wear a seat belt when traveling in a car. Always wear a helmet when riding a bicycle or motorcycle.

## 2023-02-07 NOTE — PROGRESS NOTES
Chief Complaint   Patient presents with    Hypertension        Internal Administration   First Dose COVID-19, PFIZER PURPLE top, DILUTE for use, (age 15 y+), 30mcg/0.3mL  2021   Second Dose COVID-19, PFIZER PURPLE top, DILUTE for use, (age 15 y+), 30mcg/0.3mL   2021       Last COVID Lab No results found for: SARS-COV-2, SARS-COV-2 RNA, SARS-COV-2, SARS-COV-2, SARS-COV-2 BY PCR, SARS-COV-2, SARS-COV-2, SARS-COV-2          Wt Readings from Last 3 Encounters:   23 152 lb (68.9 kg)   23 152 lb (68.9 kg)   23 152 lb 9.6 oz (69.2 kg)     BP Readings from Last 3 Encounters:   23 (!) 189/99   23 (!) 188/84   23 (!) 142/90      Lab Results   Component Value Date    LABA1C 5.7 2022       HPI:  Diane Zhang is a 69 y.o. (: 1945) here today for    Patient was in the ER on  for elevated blood pressure in the 200s/100s. He saw dr. Elisha Lamas and he started him on metoprolol but his blood pressures are still elevated. He denies any chest pain or shortness of breath. Informed that since his blood pressure is still elevated he needs to begin taking a whole tablet of metoprolol daily. Family also brought up concern about patients memory. He denies noticing any issues with his memory. He missed an appointment in January and is begin to forget things that are planned. He even forgot going for his prostate biopsy and that it was canceled because he forgot to stop his medications that he needed to stop. Discussed checking labs to look for causes of his memory changes and will do a CT brain without contrast.     [x] Patient has completed an advance directive  [] Patient has NOT completed an advanced directive  [x] Patient has a documented healthcare surrogate  [] Patient does NOT have a documented healthcare surrogate  [] Discussed the importance of establishing and updating an advanced directive. Patient has questions at this time and those were answered.   [x] Discussed the importance of establishing and updating an advanced directive.  Patient does NOT have questions at this time.    Discussed with: [x] Patient            [] Family             [] Other caregiver    Patient's medications, allergies, past medical, surgical, social and family histories were reviewed and updated asappropriate on 2/7/2023 at 8:00 AM.    ROS:  Review of Systems    All other systems reviewed and are negative except as noted above on 2/7/2023 at 8:00 AM. Additional review of systems may be scanned into the media section ofthis medical record.  Any responses requiring further intervention were pursued.    Past Medical History:   Diagnosis Date    CAD (coronary artery disease)     Carotid bruit     ED (erectile dysfunction)     Hyperlipidemia     Hypertension     Tobacco abuse      History reviewed. No pertinent family history.  Social History     Socioeconomic History    Marital status:      Spouse name: Not on file    Number of children: Not on file    Years of education: Not on file    Highest education level: Not on file   Occupational History    Not on file   Tobacco Use    Smoking status: Every Day     Packs/day: 2.00     Years: 60.00     Pack years: 120.00     Types: Cigarettes     Start date: 6/18/1956    Smokeless tobacco: Never   Vaping Use    Vaping Use: Never used   Substance and Sexual Activity    Alcohol use: No     Alcohol/week: 0.0 standard drinks    Drug use: No    Sexual activity: Yes     Partners: Female   Other Topics Concern    Not on file   Social History Narrative    Not on file     Social Determinants of Health     Financial Resource Strain: Low Risk     Difficulty of Paying Living Expenses: Not hard at all   Food Insecurity: No Food Insecurity    Worried About Running Out of Food in the Last Year: Never true    Ran Out of Food in the Last Year: Never true   Transportation Needs: Unknown    Lack of Transportation (Medical): Not on file    Lack of Transportation  (Non-Medical): No   Physical Activity: Not on file   Stress: Not on file   Social Connections: Not on file   Intimate Partner Violence: Not on file   Housing Stability: Unknown    Unable to Pay for Housing in the Last Year: Not on file    Number of Places Lived in the Last Year: Not on file    Unstable Housing in the Last Year: No     Prior to Visit Medications    Medication Sig Taking? Authorizing Provider   metoprolol succinate (TOPROL XL) 25 MG extended release tablet Take 0.5 tablets by mouth daily Yes Melecio Moraes MD   simvastatin (ZOCOR) 40 MG tablet Take one tablet by mouth every night Yes Karl Handley MD   amLODIPine (NORVASC) 10 MG tablet Take one tablet by mouth daily Yes Karl Handley MD   clopidogrel (PLAVIX) 75 MG tablet Take 1 tablet by mouth daily Yes Karl Handley MD   nitroGLYCERIN (NITROSTAT) 0.4 MG SL tablet Place 1 tablet under the tongue every 5 minutes as needed for Chest pain Yes Karl Handley MD   tadalafil (CIALIS) 20 MG tablet Take 1 tablet by mouth as needed for Erectile Dysfunction Yes Holly Pearson MD   [employer] VIT D 5000 HIGH-POTENCY 5000 UNITS CAPS capsule TAKE ONE CAPSULE BY MOUTH DAILY Yes Holly Pearson MD   aspirin 81 MG EC tablet Take 81 mg by mouth daily. Yes Historical Provider, MD   Blood Pressure KIT 1 Device by Does not apply route once for 1 dose  Karl Handley MD     Allergies   Allergen Reactions    Lisinopril Other (See Comments)     Tongue swelling    Ampicillin        OBJECTIVE:  Estimated body mass index is 24.53 kg/m² as calculated from the following:    Height as of 2/2/23: 5' 6\" (1.676 m). Weight as of this encounter: 152 lb (68.9 kg). Vitals:    02/07/23 0754 02/07/23 0755   BP: (!) 191/105 (!) 189/99   Pulse: 79    SpO2: 94%    Weight: 152 lb (68.9 kg)        Physical Exam  Vitals and nursing note reviewed. Constitutional:       General: He is not in acute distress. Appearance: He is well-developed.  He is not diaphoretic. HENT:      Head: Normocephalic and atraumatic. Right Ear: External ear normal.      Left Ear: External ear normal.      Nose: Nose normal.   Eyes:      General: Lids are normal. No scleral icterus. Right eye: No discharge. Left eye: No discharge. Pupils: Pupils are equal, round, and reactive to light. Neck:      Thyroid: No thyromegaly. Vascular: No JVD. Cardiovascular:      Rate and Rhythm: Normal rate and regular rhythm. Pulses:           Posterior tibial pulses are 2+ on the right side and 2+ on the left side. Heart sounds: Normal heart sounds. Pulmonary:      Effort: Pulmonary effort is normal. No respiratory distress. Breath sounds: Normal breath sounds. Abdominal:      Palpations: Abdomen is soft. There is no hepatomegaly or splenomegaly. Tenderness: There is no abdominal tenderness. Musculoskeletal:      Right lower leg: No edema. Left lower leg: No edema. Skin:     General: Skin is warm and dry. Coloration: Skin is not pale. Findings: No erythema or rash. Comments: Turgor normal   Neurological:      Mental Status: He is oriented to person, place, and time. Psychiatric:         Mood and Affect: Mood normal.         Behavior: Behavior normal.         Thought Content: Thought content normal.         Cognition and Memory: He exhibits impaired recent memory. Judgment: Judgment normal.            ASSESSMENT PLAN      Diagnosis Orders   1. Initial Medicare annual wellness visit        2. Acquired thrombocytopenia (HCC)        3. Stage 3a chronic kidney disease ([facility] [address]        4. Essential hypertension, benign        5. Uncontrolled hypertension        6. Memory changes  CBC with Auto Differential    Basic Metabolic Panel    Hemoglobin A1C    CT HEAD WO CONTRAST    Vitamin B12 & Folate    TSH with Reflex    T4, Free      7. Mixed hyperlipidemia  LIPID PANEL      8. Pre-diabetes  Hemoglobin A1C      9.  Coronary artery disease due to lipid rich plaque        10. Tobacco abuse        11. ACE inhibitor-aggravated angioedema, initial encounter        12. Elevated PSA        13. Cognitive impairment        In addition to the AWV patient has uncontrolled hypertension quite high. We are asking him to increase his metoprolol to XL 25 mg daily from 12.5 mg daily. Call readings in a week. He has a prostate biopsy scheduled end of the month we will work aggressively to get blood pressure down before the biopsy is done. His cardiologist is already given permission to hold aspirin and Plavix for a week, but the biopsy scheduled end of January was postponed because the patient did not hold the aspirin and Plavix. His other issue is his memory and cognitive function. Son related to me in private that he is caught his father leaving the burner on the stove. He tries not to leave his father alone. Father is not remembering things that occur, for example yesterday father loaned son some barone and then later in the day when daughter-in-law tried to repay him he could remember what for. Patient had forgotten his appointment with me last month. He did not recall that he went for the prostate biopsy. Son tells me he has not forgotten about 5 appointments in the last week or 2. We are checking for labs for contributing causes above as well as do a CAT scan of the brain. I asked the son to get together the siblings to try to make sure they are getting father's affairs in line. Hip it was to be updated today allowing us to communicate with the son for all issues that would not call. Tobacco abuse - Pt will not quit despite understanding of risks of continued tobacco use, including cancer, heart attacks, strokes or death.   There is no clinical evidence of coronary insufficiency or heart failure that requires any change in the treatment regimen and no changes in medications for cardiac conditions as listed in the medication list are necessary. Follow-up in the office in 2 months. Again blood pressure readings to be reported to us in a week. Patient should call the office immediately with new or ongoing signs or symptoms or worsening, or proceed to the emergency room. No changes in past medical history, past surgical history, social history, or family history were noted during the patient encounter unless specifically listed above. All updates of past medical history, past surgical history, social history, or family history were reviewed personally by me during the office visit. All problems listed in the assessment are stable unless noted otherwise. Medication profile reviewed personally by me during the visit. Medication side effects and possible impairments from medications were discussed as applicable. Unless stated otherwise, we will continue current medications as listed in the medication review report contained in the patient's medical record. This document was prepared by a combination of typing and transcription through a voice recognition software. Scribe attestation: Hannah Campoverde RN, am scribing for and in the presence of Jennifer Whitman MD. Electronically signed by Mane Norwood RN on 2/7/2023 at 8:00 AM      Provider attestation:     I, Dr. Carina Baptiste, personally performed the services described in this documentation, as scribed by the above signed scribe in my presence, and it is both accurate and complete. I agree with the ROS and Past Histories independently gathered by the clinical support staff and the remaining scribed note accurately describes my personal service to the patient.       2/7/2023    9:49 AM

## 2023-02-08 ENCOUNTER — CARE COORDINATION (OUTPATIENT)
Dept: CARE COORDINATION | Age: 78
End: 2023-02-08

## 2023-02-08 ENCOUNTER — TELEPHONE (OUTPATIENT)
Dept: FAMILY MEDICINE CLINIC | Age: 78
End: 2023-02-08

## 2023-02-08 LAB
ANION GAP SERPL CALCULATED.3IONS-SCNC: 19 MMOL/L (ref 3–16)
BUN BLDV-MCNC: 18 MG/DL (ref 7–20)
CALCIUM SERPL-MCNC: 10.1 MG/DL (ref 8.3–10.6)
CHLORIDE BLD-SCNC: 103 MMOL/L (ref 99–110)
CHOLESTEROL, TOTAL: 144 MG/DL (ref 0–199)
CO2: 21 MMOL/L (ref 21–32)
CREAT SERPL-MCNC: 1.4 MG/DL (ref 0.8–1.3)
ESTIMATED AVERAGE GLUCOSE: 108.3 MG/DL
GFR SERPL CREATININE-BSD FRML MDRD: 52 ML/MIN/{1.73_M2}
GLUCOSE BLD-MCNC: 91 MG/DL (ref 70–99)
HBA1C MFR BLD: 5.4 %
HDLC SERPL-MCNC: 31 MG/DL (ref 40–60)
LDL CHOLESTEROL CALCULATED: 88 MG/DL
POTASSIUM SERPL-SCNC: 3.9 MMOL/L (ref 3.5–5.1)
SODIUM BLD-SCNC: 143 MMOL/L (ref 136–145)
T4 FREE: 1.6 NG/DL (ref 0.9–1.8)
TRIGL SERPL-MCNC: 125 MG/DL (ref 0–150)
TSH REFLEX: 3.51 UIU/ML (ref 0.27–4.2)
VLDLC SERPL CALC-MCNC: 25 MG/DL

## 2023-02-08 RX ORDER — VITAMIN B COMPLEX
1000 TABLET ORAL DAILY
COMMUNITY

## 2023-02-08 SDOH — ECONOMIC STABILITY: HOUSING INSECURITY: IN THE LAST 12 MONTHS, HOW MANY PLACES HAVE YOU LIVED?: 1

## 2023-02-08 SDOH — SOCIAL STABILITY: SOCIAL NETWORK: HOW OFTEN DO YOU ATTENT MEETINGS OF THE CLUB OR ORGANIZATION YOU BELONG TO?: NEVER

## 2023-02-08 SDOH — ECONOMIC STABILITY: FOOD INSECURITY: WITHIN THE PAST 12 MONTHS, YOU WORRIED THAT YOUR FOOD WOULD RUN OUT BEFORE YOU GOT MONEY TO BUY MORE.: NEVER TRUE

## 2023-02-08 SDOH — SOCIAL STABILITY: SOCIAL NETWORK
IN A TYPICAL WEEK, HOW MANY TIMES DO YOU TALK ON THE PHONE WITH FAMILY, FRIENDS, OR NEIGHBORS?: MORE THAN THREE TIMES A WEEK

## 2023-02-08 SDOH — HEALTH STABILITY: PHYSICAL HEALTH

## 2023-02-08 SDOH — SOCIAL STABILITY: SOCIAL NETWORK: HOW OFTEN DO YOU GET TOGETHER WITH FRIENDS OR RELATIVES?: MORE THAN THREE TIMES A WEEK

## 2023-02-08 SDOH — ECONOMIC STABILITY: INCOME INSECURITY: HOW HARD IS IT FOR YOU TO PAY FOR THE VERY BASICS LIKE FOOD, HOUSING, MEDICAL CARE, AND HEATING?: NOT HARD AT ALL

## 2023-02-08 SDOH — ECONOMIC STABILITY: INCOME INSECURITY: IN THE LAST 12 MONTHS, WAS THERE A TIME WHEN YOU WERE NOT ABLE TO PAY THE MORTGAGE OR RENT ON TIME?: NO

## 2023-02-08 SDOH — ECONOMIC STABILITY: TRANSPORTATION INSECURITY
IN THE PAST 12 MONTHS, HAS LACK OF TRANSPORTATION KEPT YOU FROM MEETINGS, WORK, OR FROM GETTING THINGS NEEDED FOR DAILY LIVING?: NO

## 2023-02-08 SDOH — ECONOMIC STABILITY: FOOD INSECURITY: WITHIN THE PAST 12 MONTHS, THE FOOD YOU BOUGHT JUST DIDN'T LAST AND YOU DIDN'T HAVE MONEY TO GET MORE.: NEVER TRUE

## 2023-02-08 SDOH — SOCIAL STABILITY: SOCIAL NETWORK
DO YOU BELONG TO ANY CLUBS OR ORGANIZATIONS SUCH AS CHURCH GROUPS UNIONS, FRATERNAL OR ATHLETIC GROUPS, OR SCHOOL GROUPS?: NO

## 2023-02-08 SDOH — ECONOMIC STABILITY: TRANSPORTATION INSECURITY
IN THE PAST 12 MONTHS, HAS THE LACK OF TRANSPORTATION KEPT YOU FROM MEDICAL APPOINTMENTS OR FROM GETTING MEDICATIONS?: NO

## 2023-02-08 NOTE — CARE COORDINATION
Ambulatory Care Coordination Note  2023    Patient Current Location: [facility]    ACM contacted the patient by telephone. Verified name and  with patient as identifiers. Provided introduction to self, and explanation of the ACM role. ACM: Jair Merida RN      Method of communication with provider: CAT     ACM spoke with patient for follow ER call. Patient will be enrolled in care coordination. ER visit of HTN and he did complete follow up PCP visit already. He has improved. BP is trending down. He is now taking his Metoprolol at 25 mg daily. Medications were reviewed. Encouraged him to continue to monitor and log, BP and HR. He was given parameters to report for low HR and high BP. He verbalized understanding this. He is going to notify ACM or office if he has concerns. Encouraged low sodium diet. Handouts to be mailed. BP was down to 142/83 and HR 59 this afternoon on check     Patient is independent in care at home with supportive family living with him. He has a follow up CT scanned of brain. He has had some memory issues reported by family. No falls. No dizziness noted. Patient will need Bx of prostate in the future. He is aware. He is currently on ASA and plavix. He understands his BP needs to be better controlled. Past Medical History:   Diagnosis Date    CAD (coronary artery disease)     Carotid bruit     ED (erectile dysfunction)     Hyperlipidemia     Hypertension     Tobacco abuse      Plan      Follow up call next week  BP and HR logs  HTN and low sodium diet education   Med mgt concerns  ADs next call and [facility]        Offered patient enrollment in the Remote Patient Monitoring (RPM) program for in-home monitoring: Patient declined. He monitors is on his own.      Ambulatory Care Coordination Assessment    Care Coordination Protocol  Referral from Primary Care Provider: No  Week 1 - Initial Assessment     Do you have all of your prescriptions and are they filled?: Yes  Barriers to medication adherence: Does not understand need for medication, Other  Other barriers to medication adherence: Some having some cognitive impairment per son at PCP vist. Does forget things easily. family also reminds him to take his meds at times. Are you able to afford your medications?: Yes  How often do you have trouble taking your medications the way you have been told to take them?: I always take them as prescribed. Do you have Home O2 Therapy?: No      Ability to seek help/take action for Emergent Urgent situations i.e. fire, crime, inclement weather or health crisis. : Independent  Ability to ambulate to restroom: Independent  Ability handle personal hygeine needs (bathing/dressing/grooming): Independent  Ability to manage Medications: Independent  Ability to prepare Food Preparation: Independent  Ability to maintain home (clean home, laundry): Independent  Ability to drive and/or has transportation: Independent  Ability to do shopping: Independent  Ability to manage finances:  Independent  Is patient able to live independently?: Yes     Current Housing: Private Residence        Per the Fall Risk Screening, did the patient have 2 or more falls or 1 fall with injury in the past year?: No     Do you use rails/bars?: No     Are you experiencing loss of meaning?: No  Are you experiencing loss of hope and peace?: No     Thinking about your patient's physical health needs, are there any symptoms or problems (risk indicators) you are unsure about that require further investigation?: Mild vague physical symptoms or problems; but do not impact on daily life or are not of concern to patient   Are the patients physical health problems impacting on their mental well-being?: No identified areas of concern   Are there any problems with your patients lifestyle behaviors (alcohol, drugs, diet, exercise) that are impacting on physical or mental well-being?: No identified areas of concern   Do you have any other concerns about your patients mental well-being? How would you rate their severity and impact on the patient?: Mild problems - don't interfere with function   How would you rate their home environment in terms of safety and stability (including domestic violence, insecure housing, neighbor harassment)?: Consistently safe, supportive, stable, no identified problems   How do daily activities impact on the patient's well-being? (include current or anticipated unemployment, work, caregiving, access to transportation or other): No identified problems or perceived positive benefits   How would you rate their social network (family, work, friends)?: Good participation with social networks   How would you rate their financial resources (including ability to afford all required medical care)?: Financially secure, resources adequate, no identified problems   How wells does the patient now understand their health and well-being (symptoms, signs or risk factors) and what they need to do to manage their health?: Reasonable to good understanding and already engages in managing health or is willing to undertake better management   How well do you think your patient can engage in healthcare discussions? (Barriers include language, deafness, aphasia, alcohol or drug problems, learning difficulties, concentration): Clear and open communication, no identified barriers   Do other services need to be involved to help this patient?: Other care/services not required at this time   Are current services involved with this patient well-coordinated? (Include coordination with other services you are now recommendation):  All required care/services in place and well-coordinated   Suggested Interventions and Community Resources   Registered Dietician: Not Started   Transportation Services: Declined                  Future Appointments   Date Time Provider Tl Cain   2/13/2023  6:30 PM Berry Hernandes   4/5/2023  7:40 AM Paula Alarcon MD [place]  [place] - [facility]   8/2/2023  8:30 AM MD CLAIRE Flores [facility]      and   General Assessment    Do you have any symptoms that are causing concern?: Yes  Progression since Onset: Rapidly Improving  Reported Symptoms: Other (Comment: BP at home has improved with start of Metoprolol 25 mg daily.)

## 2023-02-08 NOTE — TELEPHONE ENCOUNTER
Yasmany(EC) called with pt's BP readings for today. 9:30AM(before medication)   /85 HR 68    About 2:15PM  /83 HR 59    Stated that yesterday was pt's first full day of the new medication.    Call back Raheel Hayward with recommendations [phone]

## 2023-02-13 ENCOUNTER — CARE COORDINATION (OUTPATIENT)
Dept: CARE COORDINATION | Age: 78
End: 2023-02-13

## 2023-02-13 ENCOUNTER — HOSPITAL ENCOUNTER (OUTPATIENT)
Dept: CT IMAGING | Age: 78
Discharge: HOME | End: 2023-02-13
Payer: MEDICARE

## 2023-02-13 DIAGNOSIS — R41.3 MEMORY CHANGES: ICD-10-CM

## 2023-02-13 PROCEDURE — 70450 CT HEAD/BRAIN W/O DYE: CPT

## 2023-02-13 NOTE — TELEPHONE ENCOUNTER
As long as heart rate is above 60 would allow him to start taking 2 of the metoprolol XL 25 mg tabs daily and report blood pressure and heart rate in a week

## 2023-02-13 NOTE — CARE COORDINATION
Ambulatory Care Coordination Note  2023    Patient Current Location: [facility]     ACM contacted the patient by telephone. Verified name and  with patient as identifiers. Provided introduction to self, and explanation of the ACM role. Challenges to be reviewed by the provider   Additional needs identified to be addressed with provider: Yes  BP readings to PCP. Patient remains in 150/90 range for BP. No symptoms to report                Method of communication with provider: chart routing. ACM: Falguni De La Rosa RN    Follow up call completed with patient. He is monitoring BP and taking meds as ordered BP seems to be staying in 150/90 ranges. He has a follow up head CT today at [facility]. He repeated the plans for this test several times on our call. He also repeated bp readings. He writing things down so not to forget. He denies any symptoms with this bp. Reveiwed with him symptoms for him to return to ER with such as extreme headaches, blurry vision. Vomiting, confusion , or numbness/ tingling. Plan    Updating provider  Follow up call in 2 days  Continue to log BP at home   ADs next call and [facility] ( still needing completion)            Offered patient enrollment in the Remote Patient Monitoring (RPM) program for in-home monitoring: Patient declined. He monitors is on his own. Lab Results       None            Care Coordination Interventions    Referral from Primary Care Provider: No  Suggested Interventions and Community Resources  Medi Set or Pill Pack: Declined (Comment: managing his own meds (family supports))  Registered Dietician: Not Started  Transportation Support: Declined          Goals Addressed                   This Visit's Progress       Care Coordination     Medication Management   Improving     I will take my medication as directed. I will notify my provider of any problems with medications, like adverse effects or side effects.   I will notify my provider/Care Coordinator if I am unable to afford my medications. I will notify my provider for advice before I stop taking any of my medication. Barriers: lack of education and forgetful   Plan for overcoming my barriers: education and support   Confidence: 7/10  Anticipated Goal Completion Date: 5/23    Med review completed again today. Discussed parameters for BP and HR. Educated on need for compliance and calling with symptom changes. He is logging and reports taking meds as directed. He did recite his med list to me several times and BP readings 2/13/23 trb        Self Monitoring   Improving     Blood Pressure - I will take my blood pressure as directed - Daily    Patient Reported Blood Pressure   Patient Reported Blood Pressure 2/13/2023 2/12/2023 2/12/2023 2/12/2023 2/11/2023   Home Blood Pressure 154/92 129/77 151/95 159/94 153/91   BP Comments - - taken in am taken in am -     Barriers: lack of education and forgetful  Plan for overcoming my barriers: education and support   Confidence: 7/10  Anticipated Goal Completion Date: 5/23    Started metoprolol 25 mg daily. Checking bp at home twice a day has improved since start, but remain in 150/90 range. Routed to provider. Patient states he feels fine.  2/13/23 trb                 Future Appointments   Date Time Provider Tl Cain   2/13/2023  6:30 PM [facility]   4/5/2023  7:40 AM Sharlene Flor MD [place]  [place] [facility]   8/2/2023  8:30 AM MD CLAIRE Dolan [facility]    and   General Assessment

## 2023-02-14 ENCOUNTER — CARE COORDINATION (OUTPATIENT)
Dept: CARE COORDINATION | Age: 78
End: 2023-02-14

## 2023-02-14 DIAGNOSIS — R41.89 COGNITIVE IMPAIRMENT: Primary | ICD-10-CM

## 2023-02-14 RX ORDER — DONEPEZIL HYDROCHLORIDE 5 MG/1
5 TABLET, FILM COATED ORAL NIGHTLY
Qty: 30 TABLET | Refills: 5 | Status: SHIPPED | OUTPATIENT
Start: 2023-02-14

## 2023-02-14 ASSESSMENT — SOCIAL DETERMINANTS OF HEALTH (SDOH)
HOW OFTEN DO YOU ATTENT MEETINGS OF THE CLUB OR ORGANIZATION YOU BELONG TO?: NEVER
HOW OFTEN DO YOU ATTEND CHURCH OR RELIGIOUS SERVICES?: 1 TO 4 TIMES PER YEAR
HOW OFTEN DO YOU GET TOGETHER WITH FRIENDS OR RELATIVES?: MORE THAN THREE TIMES A WEEK
IN A TYPICAL WEEK, HOW MANY TIMES DO YOU TALK ON THE PHONE WITH FAMILY, FRIENDS, OR NEIGHBORS?: MORE THAN THREE TIMES A WEEK
DO YOU BELONG TO ANY CLUBS OR ORGANIZATIONS SUCH AS CHURCH GROUPS UNIONS, FRATERNAL OR ATHLETIC GROUPS, OR SCHOOL GROUPS?: NO

## 2023-02-14 NOTE — ACP (ADVANCE CARE PLANNING)
Advance Care Planning   Healthcare Decision Maker:    Primary Decision Maker: Geroldine Eisenmenger - Child - [phone]    Click here to complete Healthcare Decision Makers including selection of the Healthcare Decision Maker Relationship (ie \"Primary\"). Today we     Discussed and verified [facility] [place], [facility]. decision maker. Patient has an appt upcoming with his  to discuss and update his state of affairs and will provide update documents once completed.

## 2023-02-14 NOTE — CARE COORDINATION
Ambulatory Care Coordination Note  2023    Patient Current Location: [facility]     ACM contacted the patient by telephone. Verified name and  with patient as identifiers. Method of communication with provider: chart routing. ACM: Billy Kim RN    ACM spoke with patient for outreach today. He was asked to check bp again during call. He was  elevated to 193/112 and hr was 74. He had not took his morning meds yet. He was advised to start taking 2  tabs of  Metoprolol 25 mg daily today and there after. He is was educated, as was the son, on the need for BP an HR monitoring. Instructed to report HR 60 or less. His son plans to call bp/hr readings to me as well. We also discussed the start of the Aricept that was recommended. Educated them on this drug and use. Recent CT of head completed. Patient has had issues with missed appointments, more forgetful. He has always been sharp and is struggling to keep things straight. Son taking him to appointments and getting more involved in the care. We discussed the strain of now needing to oversight his dad's care and how to handle this. He is planning a follow up with their  as well to be advised and work on making sure affairs are in order. AD/LW discussion today. These are planned to be updated per the son. Patient still stated he smoke 2 ppd per day. His son feels this has increased to more like 3. They are aware of the effects of nicotine on BP and cardiovascular health. 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 No chest pain or shortness of breath noted. Plan    Medication mgt needs  Continued support with education related to HTN  Support with memory issues   BP and HR logs  Encouraged earlier f/u with cardiology. Offered patient enrollment in the Remote Patient Monitoring (RPM) program for in-home monitoring: NA. ( Already monitoring on his own)         Lab Results       None            Care Coordination Interventions    Referral from Primary Care Provider: No  Suggested Interventions and Community Resources  Disease Association: Not Started  Medi Set or Pill Pack: Declined (Comment: managing his own meds (family supports))  Registered Dietician: Not Started  Transportation Support: Declined          Goals Addressed                   This Visit's Progress       Care Coordination     Medication Management   Improving     I will take my medication as directed. I will notify my provider of any problems with medications, like adverse effects or side effects. I will notify my provider/Care Coordinator if I am unable to afford my medications. I will notify my provider for advice before I stop taking any of my medication. Barriers: lack of education and forgetful   Plan for overcoming my barriers: education and support   Confidence: 7/10  Anticipated Goal Completion Date: 5/23    Med review completed again today. Discussed parameters for BP and HR with son and patient. . Educated on need for compliance and calling with symptom changes. He is logging and reports taking meds as directed. He did recite his med list to me several times and BP readings. Son was also included in med change discussions. 2/14/23 trb        Self Monitoring   Worsening     Blood Pressure - I will take my blood pressure as directed - Daily    Patient Reported Blood Pressure   Patient Reported Blood Pressure 2/14/2023 2/13/2023 2/12/2023 2/12/2023 2/12/2023   Home Blood Pressure 193/112 154/92 129/77 151/95 159/94   BP Comments hr 74. this was prior to meds this am - - taken in am taken in am     Barriers: lack of education and forgetful  Plan for overcoming my barriers: education and support   Confidence: 7/10  Anticipated Goal Completion Date: 5/23    Increasing metoprolol to 50 mg daily today.  Educated on BP and HR parameters to report 2/14/23 trb              Future Appointments   Date Time Provider Tl Cain   4/5/2023  7:40 AM [facility] [place] [facility]   8/2/2023  8:30 AM MD CLAIRE Barboza

## 2023-02-15 ENCOUNTER — CARE COORDINATION (OUTPATIENT)
Dept: CARE COORDINATION | Age: 78
End: 2023-02-15

## 2023-02-15 DIAGNOSIS — I25.83 CORONARY ARTERY DISEASE DUE TO LIPID RICH PLAQUE: Primary | ICD-10-CM

## 2023-02-15 DIAGNOSIS — I25.10 CORONARY ARTERY DISEASE DUE TO LIPID RICH PLAQUE: Primary | ICD-10-CM

## 2023-02-15 RX ORDER — HYDROCHLOROTHIAZIDE 25 MG/1
25 TABLET ORAL EVERY MORNING
Qty: 90 TABLET | Refills: 1 | Status: SHIPPED | OUTPATIENT
Start: 2023-02-15

## 2023-02-15 NOTE — CARE COORDINATION
Ambulatory Care Coordination Note  2/15/2023    Patient Current Location: [facility] [address] contacted the family by telephone. Verified name and  with family as identifiers. Method of communication with provider: chart routing. ACM: Duke Holloway, RN    Spoke with patient son, Mary Coughlin, regarding new orders for his dads bp control. He is assisting him with reminders and med mgt. Informed that Dr. Camden Ty (cardiology) would like to add HCTZ 25 mg to current BP regimen. Bp yesterday evening was still 154/105. He was educated on this medication and need for continued BP/HR checks. Advised that best time for BP check would be 2 hours after his BP has been taken. Advised that he will need have BMP done in 1 week. New order for HCTZ and labs will be placed by cardiology. Optimal goal for bp 130/80   Ok with 140/90 as we continue to work on reducing, per cardiology. Educated again on use of Aricept. Started on 23 at . Plan    Follow up call on Friday   Continue to log BP /HR at home   Medication mgt  Support with memory issues       Offered patient enrollment in the Remote Patient Monitoring (RPM) program for in-home monitoring: Patient declined. Lab Results       None            Care Coordination Interventions    Referral from Primary Care Provider: No  Suggested Interventions and Community Resources  Disease Association: Not Started  Medi Set or Pill Pack: Declined (Comment: managing his own meds (family supports))  Registered Dietician: Not Started  Transportation Support: Declined          Goals Addressed                   This Visit's Progress       Care Coordination     Medication Management   No change     I will take my medication as directed. I will notify my provider of any problems with medications, like adverse effects or side effects. I will notify my provider/Care Coordinator if I am unable to afford my medications.   I will notify my provider for advice before I stop taking any of my medication. Barriers: lack of education and forgetful   Plan for overcoming my barriers: education and support   Confidence: 7/10  Anticipated Goal Completion Date: 5/23    Med review completed again today. Discussed parameters for BP and HR with son and patient. . Educated on need for compliance and calling with symptom changes. He is logging and reports taking meds as directed. Adding HCTZ to bp regimen today 2/25/23 trb        Self Monitoring   No change     Blood Pressure - I will take my blood pressure as directed - Daily    Patient Reported Blood Pressure   Patient Reported Blood Pressure 2/14/2023 2/14/2023 2/13/2023 2/12/2023 2/12/2023   Home Blood Pressure 154/105 193/112 154/92 129/77 151/95   BP Comments - hr 74. this was prior to meds this am - - taken in am     Barriers: lack of education and forgetful  Plan for overcoming my barriers: education and support   Confidence: 7/10  Anticipated Goal Completion Date: 5/23    Increasing metoprolol to 50 mg daily . Adding HCTZ 25 mg today. Working with cardiology as well for mgt of bp. 2/15/23 trb.                Future Appointments   Date Time Provider Tl Cain   4/5/2023  7:40 AM Migd[facility] [place] [facility]   8/2/2023  8:30 AM MD CLAIRE Chiang

## 2023-02-15 NOTE — TELEPHONE ENCOUNTER
----- Message from Kaia Mayer RN sent at 2/15/2023  8:45 AM EST -----  Regarding: FW: HTN issues    ----- Message -----  From: Chiara Mcfadden MD  Sent: 2/14/2023   5:12 PM EST  To: Kaia aMyer RN  Subject: RE: HTN issues                                   Please call my office and ask for my nurse to place the orders I want. She will take care of it and then I will sign. Thanks. Dr. Maribell Riley    ----- Message -----  From: Kaia Mayer RN  Sent: 2/14/2023   5:02 PM EST  To: Chiara Mcfadden MD  Subject: RE: HTN issues                                   Will you be placing orders for this? I will update the patient and his son on the recommendations. Thanks     Kaia Mayer RN BSN  Ambulatory Care Manager   [email or web address]  [phone]   ----- Message -----  From: Chiara Mcfadden MD  Sent: 2/14/2023   3:18 PM EST  To: Kaia Mayer RN  Subject: RE: HTN issues                                   Please add HCTZ 25mg po daily to regimen and see how he does. He will need repeat BMP in 1 week after starting med. Thanks. Dr. Maribell Riley    ----- Message -----  From: Kaia Mayer RN  Sent: 2/14/2023  12:37 PM EST  To: Chiara Mcfadden MD  Subject: RE: HTN issues                                   Currently, He was increased to 50 mg of metoprolol succinate daily today. He is also on amlodipine  10 mg daily.     ----- Message -----  From: Chiara Mcfadden MD  Sent: 2/14/2023  11:52 AM EST  To: Kaia Mayer RN  Subject: RE: HTN issues                                   Erich, Ms. Vaughn Kam. What is his exact medical regimen for anti-hypertensives and I can adjust meds accordingly. Metoprolol is not a very effective BP medication. Would take BP couple hours after AM meds and goal ideally 130/80 or less but would settle for < 140/90. Thanks.    Dr. Maribell Riley    ----- Message -----  From: Kaia Mayer RN  Sent: 2/14/2023  10:37 AM EST  To: Chiara Mcfadden MD  Subject: HTN issues Good morning  This patient was just seen in on 2/2/23. He has been checking BP at home as recommended and has been found to have elevated pressures. Today PCP increased his Metoprolol to 50 mg daily. On average he has been in the 150/90 range at home. Today he was up to 193/112 but that was prior to his meds today. They plan to continue to recheck later as well. Family is wondering if you would want to see him any earlier for additional follow up. He denies any chest pain or shortness of breath. He did also have a CT/head done yesterday as well due to some progressive issue with his memory. I am working him in care coordination program, so I also will be reaching out to him regularly as well to follow up. Thanks   Tyrone Isidro RN BSN  Ambulatory Care Manager   [facility]  [email or web address]  [phone]

## 2023-02-20 ENCOUNTER — CARE COORDINATION (OUTPATIENT)
Dept: CARE COORDINATION | Age: 78
End: 2023-02-20

## 2023-02-20 ENCOUNTER — TELEPHONE (OUTPATIENT)
Dept: FAMILY MEDICINE CLINIC | Age: 78
End: 2023-02-20

## 2023-02-20 DIAGNOSIS — I10 ESSENTIAL HYPERTENSION, BENIGN: Primary | ICD-10-CM

## 2023-02-20 NOTE — CARE COORDINATION
Per ACM- Can you contact his son , Lizy Velasquez, and see how HR and BP are. BP Med changes last week. Patient has some dementia but sometimes can also give you the numbers. Contacted son/Yasmany- Sammy to call CCSS back. Contacted Patient   Everything has been going well everyday. Asked patient if he was writing down BP and HR readings and he said yes. Provided the following readings    2.7 - 165/85  2.8 - 205/101 HR 59  2.9 - 179/99 and 156/92  2.10- No Reading  2.11- 157/91 HR 94  2.12 - 157/91 HR 65  2.13 - 159/92  2.14 - 154/105  2.15 - 158/88  2.16 - 145/85 HR 60  2.17 - 146/91 HR 76  2.19 - 130/89 HR 64    Patient would like a call back to discuss when he should be concerned about BP/HR and when he would need to contact PCP. He is aware that his goal is 130/80 per ACM but wasn't sure when he should be concerned/reach out to PCP or ACM.     Routed to [employer]

## 2023-02-20 NOTE — TELEPHONE ENCOUNTER
Rachael Kingsley with [facility] called stating that pt was scheduled for a procedure, but he didn't follow through with getting a pre-op and following the medication instructions before the procedure. States that they just got off the phone with pt's son and was told that he had an appt with PCP on 2/7/2023. They are wanting to know if PCP would be willing to do an addendum to the visit note to say he's cleared for surgery. States they already did get cardiac clearance from cardio. Fax addendum with blood work results(mainly creatine and BUN) to [facility] [phone] and [phone].    Call back Rachael Kingsley with any questions [phone]

## 2023-02-20 NOTE — TELEPHONE ENCOUNTER
Before I state in that note he is cleared for surgery, I would like to have updated blood pressure readings

## 2023-02-21 ENCOUNTER — NURSE ONLY (OUTPATIENT)
Dept: FAMILY MEDICINE CLINIC | Age: 78
End: 2023-02-21

## 2023-02-21 DIAGNOSIS — R42 DIZZINESS: ICD-10-CM

## 2023-02-21 DIAGNOSIS — I25.10 CORONARY ARTERY DISEASE DUE TO LIPID RICH PLAQUE: ICD-10-CM

## 2023-02-21 DIAGNOSIS — I25.83 CORONARY ARTERY DISEASE DUE TO LIPID RICH PLAQUE: ICD-10-CM

## 2023-02-21 DIAGNOSIS — Z79.899 MEDICATION MANAGEMENT: ICD-10-CM

## 2023-02-21 LAB
ALBUMIN SERPL-MCNC: 4.4 G/DL (ref 3.4–5)
ALP BLD-CCNC: 102 U/L (ref 40–129)
ALT SERPL-CCNC: 12 U/L (ref 10–40)
ANION GAP SERPL CALCULATED.3IONS-SCNC: 14 MMOL/L (ref 3–16)
AST SERPL-CCNC: 15 U/L (ref 15–37)
BILIRUB SERPL-MCNC: 0.7 MG/DL (ref 0–1)
BILIRUBIN DIRECT: <0.2 MG/DL (ref 0–0.3)
BILIRUBIN, INDIRECT: NORMAL MG/DL (ref 0–1)
BUN BLDV-MCNC: 20 MG/DL (ref 7–20)
CALCIUM SERPL-MCNC: 9.9 MG/DL (ref 8.3–10.6)
CHLORIDE BLD-SCNC: 104 MMOL/L (ref 99–110)
CHOLESTEROL, TOTAL: 145 MG/DL (ref 0–199)
CO2: 26 MMOL/L (ref 21–32)
CREAT SERPL-MCNC: 1.5 MG/DL (ref 0.8–1.3)
GFR SERPL CREATININE-BSD FRML MDRD: 48 ML/MIN/{1.73_M2}
GLUCOSE BLD-MCNC: 103 MG/DL (ref 70–99)
HDLC SERPL-MCNC: 34 MG/DL (ref 40–60)
LDL CHOLESTEROL CALCULATED: 84 MG/DL
POTASSIUM SERPL-SCNC: 4.7 MMOL/L (ref 3.5–5.1)
SODIUM BLD-SCNC: 144 MMOL/L (ref 136–145)
TOTAL PROTEIN: 7.2 G/DL (ref 6.4–8.2)
TRIGL SERPL-MCNC: 136 MG/DL (ref 0–150)
VLDLC SERPL CALC-MCNC: 27 MG/DL

## 2023-02-22 DIAGNOSIS — E78.5 HYPERLIPIDEMIA, UNSPECIFIED HYPERLIPIDEMIA TYPE: Primary | ICD-10-CM

## 2023-02-22 RX ORDER — METOPROLOL SUCCINATE 25 MG/1
50 TABLET, EXTENDED RELEASE ORAL DAILY
Qty: 180 TABLET | Refills: 1 | Status: SHIPPED | OUTPATIENT
Start: 2023-02-22

## 2023-02-22 RX ORDER — ROSUVASTATIN CALCIUM 40 MG/1
40 TABLET, COATED ORAL DAILY
Qty: 90 TABLET | Refills: 1 | Status: SHIPPED | OUTPATIENT
Start: 2023-02-22

## 2023-02-22 NOTE — TELEPHONE ENCOUNTER
Called giovanni and discussed this with him. He is going to call back with patients readings. He states that he did not start the HCTZ because he discovered pt was only taking the metoprolol as one a day so just made sure he is now taking two metoprolol daily.

## 2023-02-22 NOTE — TELEPHONE ENCOUNTER
----- Message from Johanny Ramirez MD sent at 2/22/2023  7:31 AM EST -----  Kidney function stable. Liver test good. LDL cholesterol still not at goal 54 and need < 70 as low as possible. I recommend d/c zocor 40mg qhs and start crestor 40mg po qd in its place. Recheck FLP and LFT's in 2 months. If not at goal then will add zetia and then repatha possibly. Thanks.

## 2023-02-22 NOTE — TELEPHONE ENCOUNTER
Corrected office notes sent to number requested. Son called ad informed to not start the HCTZ since readings better since he is now taking the metoprolol correctly.

## 2023-02-22 NOTE — TELEPHONE ENCOUNTER
Yasmany() called with BP readings for a couple of days this week. States that he did find out that pt wasn't taking the metoprolol the way it was prescribed. States that since he's been monitoring pt's Rx's and making sure he takes Rx's correctly he's noticed his BP has gone down some. States that pt hasn't started the hydrochlorothiazide due to wanting to see if pt's BP will continue you to go down some more with just the metoprolol. 2/19- /89(AM) - 135/80(PM)  2/20- /84(AM) - 123/69(PM)    Stating that pt's HR has been running between 65-75 and 1 day it was down to 60.    Call back Dyer with any recommendations [phone]

## 2023-02-23 ENCOUNTER — CARE COORDINATION (OUTPATIENT)
Dept: CARE COORDINATION | Age: 78
End: 2023-02-23

## 2023-02-23 NOTE — CARE COORDINATION
Ambulatory Care Coordination Note  2023    Patient Current Location: [facility]     ACM contacted the family, Rafael Bonilla son, by telephone. Verified name and  with family as identifiers. Provided introduction to self, and explanation of the ACM role. Method of communication with provider: chart routing. ACM: Dede Bloom RN    ACM spoke with patient's son, Rafael Bonilla for follow up the patient. He is managing his dads's medications He found that he was not taking them a prescribed. BP has been improving. He is currently not taking the HCTZ. Monitoring bp a couple times a day. New orders from cardiology to start on Crestor. His son is picking that up today. Plans to have the prostate bx on Monday and is currently off blood thinners. Son has had some issue with convincing the patient on med changes and care needs. He sometimes is argumentative to him. Discussed options such as alzheimer's organization and supports with techniques to deal with dementia patients. Plan    Follow up call next week   Bp checks   Medication mgt  Support with memory issues      Offered patient enrollment in the Remote Patient Monitoring (RPM) program for in-home monitoring: NA.     Lab Results       None            Care Coordination Interventions    Referral from Primary Care Provider: No  Suggested Interventions and Community Resources  Disease Association: Not Started  Medi Set or Pill Pack: Declined (Comment: managing his own meds (family supports))  Registered Dietician: Not Started  Transportation Support: Declined          Goals Addressed    None         Future Appointments   Date Time Provider Tl Cain   2023  7:40 AM Sonali Sherwood MD [facility]  [place]   2023  8:30 AM MD CLAIRE Ruiz [facility]    and   General Assessment    Do you have any symptoms that are causing concern?: No

## 2023-03-07 NOTE — TELEPHONE ENCOUNTER
Transitional Care Management Telephone Call Attempt    Discharge Date: 3/6/23  Discharge Location: [facility]: [facility]    Call Attempt Date: 3/7/2023  Call Attempt: First   Upcoming appt 7-

## 2023-03-08 DIAGNOSIS — R41.89 COGNITIVE IMPAIRMENT: ICD-10-CM

## 2023-03-08 RX ORDER — DONEPEZIL HYDROCHLORIDE 5 MG/1
5 TABLET, FILM COATED ORAL NIGHTLY
Qty: 30 TABLET | Refills: 2 | Status: SHIPPED | OUTPATIENT
Start: 2023-03-08

## 2023-03-08 NOTE — TELEPHONE ENCOUNTER
Jacob Santiago is requesting refill(s)   Last OV 2/7/23 (pertaining to medication)  LR 2/14/23 (per medication requested)  Next office visit scheduled or attempted Yes   If no, reason:  4/5/23

## 2023-03-22 ENCOUNTER — CARE COORDINATION (OUTPATIENT)
Dept: CARE COORDINATION | Age: 78
End: 2023-03-22

## 2023-03-22 NOTE — CARE COORDINATION
Ambulatory Care Coordination Note  3/22/2023        Attempted to contact patient for follow up transition call. Sounded as if someone picked up the phone and hung up. Tried again same thing happened. Called son Yasmany,no answer. Left voicemail message to return call with an update on condition since discharge. Contact information provided. Will continue to follow up.       Plan:   Follow up call next week   Bp checks   Medication mgt  Support with memory issues        Lab Results       None            Care Coordination Interventions    Referral from Primary Care Provider: No  Suggested Interventions and Community Resources  Disease Association: Not Started  Medi Set or Pill Pack: Declined (Comment: managing his own meds (family supports))  Registered Dietician: Not Started  Transportation Support: Declined          Goals Addressed    None         Future Appointments   Date Time Provider Tl Cain   4/5/2023  7:40 AM [facility] [place] [facility]   8/2/2023  8:30 AM MD CLAIRE Lawson

## 2023-04-01 LAB — PATHOLOGY/CYTOLOGY REPORT: NORMAL

## 2023-04-03 ENCOUNTER — CARE COORDINATION (OUTPATIENT)
Dept: CARE COORDINATION | Age: 78
End: 2023-04-03

## 2023-04-03 NOTE — CARE COORDINATION
ACM attempted outreach. Left message for son Rocky Weir for follow up . Contact information for call back provided.  2nd missed call     Plan:   Follow up call next week   Bp checks   Medication mgt  Support with memory issues -Best to speak with son (Patient can engage, but may not remember directions given)

## 2023-04-05 ENCOUNTER — OFFICE VISIT (OUTPATIENT)
Dept: FAMILY MEDICINE CLINIC | Age: 78
End: 2023-04-05

## 2023-04-05 VITALS
BODY MASS INDEX: 24.05 KG/M2 | WEIGHT: 149 LBS | OXYGEN SATURATION: 98 % | SYSTOLIC BLOOD PRESSURE: 116 MMHG | HEART RATE: 57 BPM | DIASTOLIC BLOOD PRESSURE: 70 MMHG

## 2023-04-05 DIAGNOSIS — E55.9 VITAMIN D DEFICIENCY: ICD-10-CM

## 2023-04-05 DIAGNOSIS — R41.89 COGNITIVE IMPAIRMENT: ICD-10-CM

## 2023-04-05 DIAGNOSIS — I25.10 CORONARY ARTERY DISEASE DUE TO LIPID RICH PLAQUE: ICD-10-CM

## 2023-04-05 DIAGNOSIS — E78.2 MIXED HYPERLIPIDEMIA: ICD-10-CM

## 2023-04-05 DIAGNOSIS — N18.31 STAGE 3A CHRONIC KIDNEY DISEASE (HCC): ICD-10-CM

## 2023-04-05 DIAGNOSIS — I10 ESSENTIAL HYPERTENSION, BENIGN: Primary | ICD-10-CM

## 2023-04-05 DIAGNOSIS — I25.83 CORONARY ARTERY DISEASE DUE TO LIPID RICH PLAQUE: ICD-10-CM

## 2023-04-05 DIAGNOSIS — Z72.0 TOBACCO ABUSE: ICD-10-CM

## 2023-04-05 RX ORDER — ROSUVASTATIN CALCIUM 40 MG/1
40 TABLET, COATED ORAL DAILY
Qty: 90 TABLET | Refills: 3 | Status: SHIPPED | OUTPATIENT
Start: 2023-04-05

## 2023-04-05 NOTE — PROGRESS NOTES
kg).  Vitals:    04/05/23 0749   BP: 116/70   Pulse: 57   SpO2: 98%   Weight: 149 lb (67.6 kg)       Physical Exam  Vitals and nursing note reviewed. Constitutional:       General: He is not in acute distress. Appearance: He is well-developed. He is not diaphoretic. HENT:      Head: Normocephalic and atraumatic. Right Ear: External ear normal.      Left Ear: External ear normal.      Nose: Nose normal.   Eyes:      General: Lids are normal. No scleral icterus. Right eye: No discharge. Left eye: No discharge. Pupils: Pupils are equal, round, and reactive to light. Neck:      Thyroid: No thyromegaly. Vascular: No JVD. Cardiovascular:      Rate and Rhythm: Normal rate and regular rhythm. Heart sounds: Normal heart sounds. Pulmonary:      Effort: Pulmonary effort is normal. No respiratory distress. Breath sounds: Normal breath sounds. Abdominal:      Palpations: Abdomen is soft. There is no hepatomegaly or splenomegaly. Tenderness: There is no abdominal tenderness. Musculoskeletal:      Right lower leg: No edema. Left lower leg: No edema. Skin:     General: Skin is warm and dry. Coloration: Skin is not pale. Findings: No erythema or rash. Comments: Turgor normal   Neurological:      Mental Status: He is alert and oriented to person, place, and time. Comments: Diminished memory although I think he is oriented to person place and time today his speech pattern is somewhat superficial repeats the same statements   Psychiatric:         Mood and Affect: Mood normal.         Behavior: Behavior normal.         Thought Content: Thought content normal.         Judgment: Judgment normal.            ASSESSMENT PLAN      Diagnosis Orders   1. Essential hypertension, benign        2. Mixed hyperlipidemia        3. Cognitive impairment        4. Coronary artery disease due to lipid rich plaque        5.  Stage 3a chronic kidney disease ([address]

## 2023-04-05 NOTE — PATIENT INSTRUCTIONS

## 2023-05-04 LAB — PATHOLOGY/CYTOLOGY REPORT: NORMAL

## 2023-05-08 ENCOUNTER — CARE COORDINATION (OUTPATIENT)
Dept: CARE COORDINATION | Age: 78
End: 2023-05-08

## 2023-05-08 RX ORDER — DONEPEZIL HYDROCHLORIDE 10 MG/1
10 TABLET, FILM COATED ORAL DAILY
COMMUNITY

## 2023-05-08 NOTE — CARE COORDINATION
Ambulatory Care Coordination Note  2023    Patient Current Location: [address] contacted the  son, Arianne Delgado  by telephone. Verified name and  with caregiver as identifiers. Provided introduction to self, and explanation of the ACM role. Method of communication with provider: chart routing. ACM: Annamarie Bernal RN    GER spoke with patient's son Arianne Delgado this am for follow up . Yasmany contacted [facility] regarding his dad's med. Medication review was completed. The patient has been out of rouvastatin for a few days. Verified with Arianne Delgado that there should be a prescription for this at [facility] as it was previously ordered with refills. He is going to again check with them and contact me it there continues to be an issue. We also discussed how he is doing with his meds. The patient continues to manage them with oversight. The  biggest challenge as been when med bottle shows generic or brand name. The different name make the patient confused and frustrated. He feels his taking the wrong med when there is a name change and he argues with the family about this. He also has difficulty take meds out of his normal daily routine that he has had for years. He struggles with HS meds. He will take the Donepezil and rouvastatin if it is in the morning. He has never really had any meds in the past at bedtime and and argues it needs to me with a meal. He is compliant with the meds if he takes them in the AM. Discussed with his son about routines and that it may be best to keep it adjusted for compliance. He stated the PCP is aware that he has been taking them in this manner. The family has been making some progress with this patient in getting personal business in order. He has allowed his son to get on accounts and they are working to get POA and finances squared away. This has been a slow process as the patient has been reluctant to make changes and lose this independence. HTN.  BP has been on target and improved per son at

## 2023-06-06 ENCOUNTER — CARE COORDINATION (OUTPATIENT)
Dept: CARE COORDINATION | Age: 78
End: 2023-06-06

## 2023-06-06 NOTE — CARE COORDINATION
ACM attempted outreach to son. No answer at this time     Plan    Potential d/c. He has been stable   Follow up in 2 weeks   Improved bp noted, improved med compliance  Offer additional family supports.-they have been proactive in reaching out when supports are needed.

## 2023-07-11 ENCOUNTER — CARE COORDINATION (OUTPATIENT)
Dept: CARE COORDINATION | Age: 78
End: 2023-07-11

## 2023-07-11 NOTE — CARE COORDINATION
Ambulatory Care Coordination Note  2023    Patient Current Location: [address] contacted the  son, Tiffany Watt   by telephone. Verified name and  with  son   as identifiers. Provided introduction to self, and explanation of the ACM role. Method of communication with provider: chart routing. ACM: Phoebe Sin, RN    ACM spoke with son, Tiffany Watt for additional outreach. He has seen  some additional decline in memory with is dad. 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 He seems to still enjoy this. He is only doing an occasional drive with family. He drives safely with them in home area, but they fear he would get lost on his own if her made a wrong turn. BP has been stable and meds managed by family. Doing well at this time. No additional services indicated at this time. I plan on completing care coordination at this time. Patient's family has my contact information for symptom changes or needs in additional resources. They reach out appropriately. Offered patient enrollment in the Remote Patient Monitoring (RPM) program for in-home monitoring: NA. Lab Results       None            Care Coordination Interventions    Referral from Primary Care Provider: No  Suggested Interventions and Community Resources  Disease Association: Completed (Comment: alzheimers organzation contact provieded trb)  Medi Set or Pill Pack: Declined (Comment: managing his own meds (family supports))  Pharmacist: Declined  Registered Dietician: Declined  Transportation Support: Declined (Comment: patient still drives)          Goals Addressed                   This Visit's Progress       Care Coordination     COMPLETED: Medication Management        I will take my medication as directed. I will notify my provider of any problems with medications, like adverse effects or side effects. I will notify my provider/Care Coordinator if I am unable to afford my medications.   I will notify my provider for advice before

## 2023-07-28 NOTE — PROGRESS NOTES
mouth daily, Disp: 90 tablet, Rfl: 3    clopidogrel (PLAVIX) 75 MG tablet, Take 1 tablet by mouth daily, Disp: 90 tablet, Rfl: 3    nitroGLYCERIN (NITROSTAT) 0.4 MG SL tablet, Place 1 tablet under the tongue every 5 minutes as needed for Chest pain, Disp: 25 tablet, Rfl: 1    Blood Pressure KIT, 1 Device by Does not apply route once for 1 dose, Disp: 1 kit, Rfl: 0    aspirin 81 MG EC tablet, Take 1 tablet by mouth daily, Disp: , Rfl:     Review of Systems:  Cardiac: No chest pain and no palpitations. Respiratory: No new SOB, PND, orthopnea or cough. Neurological: No syncope or TIA. General: No major weight gain or loss, no fatigue, no weakness, no fever. Musculoskeletal: No new muscle or joint pain. GI: No change in bowel habits. Psychiatric: No anxiety, no panic, no insomnia, no depression. Skin: No rash. Lab Results   Component Value Date/Time    CHOL 145 02/21/2023 08:05 AM     No results for input(s): AST, ALT, ALB, BILIDIR, BILITOT, ALKPHOS in the last 72 hours. Vitals:    08/02/23 0821   BP: 106/60   Pulse: 68   SpO2: 96%   Weight: 144 lb (65.3 kg)   Height: 5' 6\" (1.676 m)        Physical Examination  Constitutional: He is oriented to person, place and time. He appears well developed and well nourished. Head: Normocephalic and atraumatic. Neck: Neck supple. No JVD present. Carotid bruit is not present. No mass and no thyromegaly present. Cardiovascular: Normal rate, regular rhythm, normal heart sounds and intact distal pulses. Exam reveals no gallop and no friction rub. No murmur heard. Pulmonary/Chest: Effort normal and diffusely soft breathe sounds. No respiratory distress. He has no wheezes, rhonchi or rales. Abdominal: Soft, non-tender. Bowel sounds and aorta are normal.  He exhibits no organomegaly, mass or bruit. Extremities: No edema, cyanosis or clubbing. Neurological: He is alert, and oriented to person, place and time. He has normal reflexes. No cranial nerve deficit.

## 2023-08-02 ENCOUNTER — OFFICE VISIT (OUTPATIENT)
Dept: CARDIOLOGY CLINIC | Age: 78
End: 2023-08-02
Payer: MEDICARE

## 2023-08-02 ENCOUNTER — TELEPHONE (OUTPATIENT)
Dept: CARDIOLOGY CLINIC | Age: 78
End: 2023-08-02

## 2023-08-02 VITALS
HEART RATE: 68 BPM | SYSTOLIC BLOOD PRESSURE: 106 MMHG | HEIGHT: 66 IN | WEIGHT: 144 LBS | BODY MASS INDEX: 23.14 KG/M2 | OXYGEN SATURATION: 96 % | DIASTOLIC BLOOD PRESSURE: 60 MMHG

## 2023-08-02 DIAGNOSIS — I10 ESSENTIAL HYPERTENSION, BENIGN: ICD-10-CM

## 2023-08-02 DIAGNOSIS — Z72.0 TOBACCO ABUSE: ICD-10-CM

## 2023-08-02 DIAGNOSIS — I10 UNCONTROLLED HYPERTENSION: ICD-10-CM

## 2023-08-02 DIAGNOSIS — I25.83 CORONARY ARTERY DISEASE DUE TO LIPID RICH PLAQUE: Primary | ICD-10-CM

## 2023-08-02 DIAGNOSIS — I25.10 CORONARY ARTERY DISEASE DUE TO LIPID RICH PLAQUE: Primary | ICD-10-CM

## 2023-08-02 PROCEDURE — 3078F DIAST BP <80 MM HG: CPT | Performed by: INTERNAL MEDICINE

## 2023-08-02 PROCEDURE — 99214 OFFICE O/P EST MOD 30 MIN: CPT | Performed by: INTERNAL MEDICINE

## 2023-08-02 PROCEDURE — 1123F ACP DISCUSS/DSCN MKR DOCD: CPT | Performed by: INTERNAL MEDICINE

## 2023-08-02 PROCEDURE — 3074F SYST BP LT 130 MM HG: CPT | Performed by: INTERNAL MEDICINE

## 2023-08-02 PROCEDURE — 93000 ELECTROCARDIOGRAM COMPLETE: CPT | Performed by: INTERNAL MEDICINE

## 2023-08-02 RX ORDER — METOPROLOL SUCCINATE 25 MG/1
50 TABLET, EXTENDED RELEASE ORAL DAILY
Qty: 180 TABLET | Refills: 3 | Status: SHIPPED | OUTPATIENT
Start: 2023-08-02

## 2023-08-02 RX ORDER — CLOPIDOGREL BISULFATE 75 MG/1
TABLET ORAL
Qty: 90 TABLET | Refills: 3 | Status: SHIPPED | OUTPATIENT
Start: 2023-08-02

## 2023-08-02 RX ORDER — AMLODIPINE BESYLATE 10 MG/1
TABLET ORAL
Qty: 90 TABLET | Refills: 3 | Status: SHIPPED | OUTPATIENT
Start: 2023-08-02

## 2023-08-02 RX ORDER — ROSUVASTATIN CALCIUM 40 MG/1
40 TABLET, COATED ORAL DAILY
Qty: 90 TABLET | Refills: 3 | Status: SHIPPED | OUTPATIENT
Start: 2023-08-02

## 2023-08-02 NOTE — TELEPHONE ENCOUNTER
Pts daughter called stating pt cannot come in at 3;30 on 8/2 due to no transportation. [address] [facility] with [place] office. Please call  Philip Aguirre  [phone]   To schedule EKG.

## 2023-08-02 NOTE — TELEPHONE ENCOUNTER
Pt was seen in office this AM with SMM pt is due for yearly EKG. Was going to do EKG in office, pt left before I could do it. Spoke with pt and he is coming back this afternoon to have the EKG completed. Front - do we need to add pt to lab schedule for Cler or And? [facility] does not have a lab schedule. Pt stated he would come back at 3:30 today.

## 2023-08-02 NOTE — PATIENT INSTRUCTIONS
PLAN:  1. Discussed recent weight loss. Try to eat three meals a day. Try boost or ensure nutrition drinks for increased calorie intake. 2. Discussed repatha injectable cholesterol medication. Will hold off for now  3. Continue current medications  4. Continue to monitor blood pressure. Goal less than 130/80.  Let me know if above goal   Follow up in 6 months

## 2023-08-08 ENCOUNTER — HOSPITAL ENCOUNTER (OUTPATIENT)
Age: 78
Discharge: HOME | End: 2023-08-08
Payer: MEDICARE

## 2023-08-08 LAB
EKG ATRIAL RATE: 51 BPM
EKG DIAGNOSIS: NORMAL
EKG P AXIS: 69 DEGREES
EKG P-R INTERVAL: 174 MS
EKG Q-T INTERVAL: 498 MS
EKG QRS DURATION: 148 MS
EKG QTC CALCULATION (BAZETT): 458 MS
EKG R AXIS: 32 DEGREES
EKG T AXIS: 48 DEGREES
EKG VENTRICULAR RATE: 51 BPM

## 2023-08-08 PROCEDURE — 93005 ELECTROCARDIOGRAM TRACING: CPT | Performed by: FAMILY MEDICINE

## 2023-08-08 PROCEDURE — 93010 ELECTROCARDIOGRAM REPORT: CPT | Performed by: INTERNAL MEDICINE

## 2023-08-16 RX ORDER — HYDROCHLOROTHIAZIDE 25 MG/1
TABLET ORAL
Qty: 90 TABLET | Refills: 1 | OUTPATIENT
Start: 2023-08-16

## 2023-08-16 NOTE — TELEPHONE ENCOUNTER
Spoke to Reddy banks, the pt's Son per the pt's HIPPA form. Asked if pt is taking HCTZ. Yasmany stated the pt is not taking HCTZ. Yasmany asked me to make sure he is listed as POA in the chart. Reddy banks stated that his Father has Alzheimer's and that they have actually disconnected the pt's phone. So calling Jacob's number would not be helpful. Joseph Ruiz can you please update pt's contact info?

## 2023-10-11 ENCOUNTER — OFFICE VISIT (OUTPATIENT)
Dept: FAMILY MEDICINE CLINIC | Age: 78
End: 2023-10-11

## 2023-10-11 VITALS
BODY MASS INDEX: 23.73 KG/M2 | SYSTOLIC BLOOD PRESSURE: 138 MMHG | WEIGHT: 147 LBS | DIASTOLIC BLOOD PRESSURE: 82 MMHG | HEART RATE: 64 BPM | OXYGEN SATURATION: 96 %

## 2023-10-11 DIAGNOSIS — I25.83 CORONARY ARTERY DISEASE DUE TO LIPID RICH PLAQUE: ICD-10-CM

## 2023-10-11 DIAGNOSIS — E78.2 MIXED HYPERLIPIDEMIA: ICD-10-CM

## 2023-10-11 DIAGNOSIS — E55.9 VITAMIN D DEFICIENCY: ICD-10-CM

## 2023-10-11 DIAGNOSIS — Z72.0 TOBACCO ABUSE: ICD-10-CM

## 2023-10-11 DIAGNOSIS — F03.B18 MODERATE DEMENTIA WITH OTHER BEHAVIORAL DISTURBANCE, UNSPECIFIED DEMENTIA TYPE (HCC): Primary | ICD-10-CM

## 2023-10-11 DIAGNOSIS — I25.10 CORONARY ARTERY DISEASE DUE TO LIPID RICH PLAQUE: ICD-10-CM

## 2023-10-11 DIAGNOSIS — I10 ESSENTIAL HYPERTENSION, BENIGN: ICD-10-CM

## 2023-10-11 DIAGNOSIS — Z23 NEEDS FLU SHOT: ICD-10-CM

## 2023-10-11 DIAGNOSIS — L82.0 KERATOSIS, INFLAMED SEBORRHEIC: ICD-10-CM

## 2023-10-11 PROBLEM — F03.90 DEMENTIA (HCC): Status: ACTIVE | Noted: 2023-02-07

## 2023-10-11 RX ORDER — DONEPEZIL HYDROCHLORIDE 10 MG/1
10 TABLET, FILM COATED ORAL DAILY
Qty: 90 TABLET | Refills: 3 | Status: SHIPPED | OUTPATIENT
Start: 2023-10-11

## 2023-10-11 NOTE — PROGRESS NOTES
Chief Complaint   Patient presents with    Hypertension        Internal Administration   First Dose COVID-19, PFIZER PURPLE top, DILUTE for use, (age 15 y+), 30mcg/0.3mL  2021   Second Dose COVID-19, PFIZER PURPLE top, DILUTE for use, (age 15 y+), 30mcg/0.3mL   2021       Last COVID Lab No results found for: \"SARS-COV-2\", \"SARS-COV-2 RNA\", \"SARS-COV-2 RNA, RT PCR\", \"SARS-COV-2, LAYLA\", \"SARS-COV-2, NAAT\", \"SARS-COV-2 BY PCR\", \"POC\", \"SARS-COV-2, POC\", \"RAPID\", \"SARS-COV-2, RAPID\", \"SALIVA\", \"SARS-COV-2, SALIVA\"          Wt Readings from Last 3 Encounters:   10/11/23 147 lb (66.7 kg)   23 144 lb (65.3 kg)   23 149 lb (67.6 kg)     BP Readings from Last 3 Encounters:   10/11/23 138/82   23 106/60   23 116/70      Lab Results   Component Value Date    LABA1C 5.4 2023    LABA1C 5.7 2022       HPI:  Beth Mello is a 69 y.o. (: 1945) here today for    Patients son states that he did restart his memory medication. Per son patient is having a lot of issues with remembering his medications and is also deleting message informing him that medications are ready to be picked up. Son also states that he feels like his memory is deteriorating. He states that he for a while was forgetting to eat but now he seems to have his appetite back and is eating twice due to not remembering he just ate. Discussed with patient that he does have dementia and explained to him the disease process with dementia. Discussed that he needs to allow his family to help him with his medications and reminding of the need to bath and change clothes and to eat. Discussed that he really should allow family to set up a pill box so that he can easily take his medications as prescribed. Also discussed the need to talk to family about managing his money as well since he is forgetting to pay bills and also is forgetting to take checks to the bank.      Son states father is smoking double what he used to

## 2023-10-13 ENCOUNTER — TELEPHONE (OUTPATIENT)
Dept: PHARMACY | Facility: CLINIC | Age: 78
End: 2023-10-13

## 2023-10-13 NOTE — TELEPHONE ENCOUNTER
[facility][note 3] CLINICAL PHARMACY: ADHERENCE REVIEW  Identified care gap per [employer]: fills at [facility]: Statin adherence    ASSESSMENT   [facility] Records claims through 10/7/23 (Prior Year [address] = 96% - PASSED; YTD [address] = 78%; Potential Fail Date: 10/22/23):   ROSUVASTATIN TAB 40MG last filled on 23 for 90 day supply. Next refill due: 23    Prescribed si tablet/capsule daily    Per Insurer Portal: last filled on 23 for 90 day supply but was returned to stock    Lab Results   Component Value Date    CHOL 145 2023    TRIG 136 2023    HDL 34 (L) 2023    LDLCALC 84 2023     ALT   Date Value Ref Range Status   2023 12 10 - 40 U/L Final     AST   Date Value Ref Range Status   2023 15 15 - 37 U/L Final     The 10-year ASCVD risk score (Yelena DEL REAL, et al., 2019) is: 36%    Values used to calculate the score:      Age: 69 years      Sex: Male      Is Non- : No      Diabetic: No      Tobacco smoker: Yes      Systolic Blood Pressure: 974 mmHg      Is BP treated: Yes      HDL Cholesterol: 34 mg/dL      Total Cholesterol: 145 mg/dL     PLAN  Per insurer report, LIS-0 - co-pays are based on tiers and patient is subject to coverage gap. Patient not found in Outcomes MTM    The following are interventions that have been identified:   Patient overdue refilling ROSUVASTATIN TAB 40MG and active on home medication list.   Patient eligible for 100 day supply of ROSUVASTATIN TAB 40MG    Reached son for review. Patient has dementia and his phone got turned off for some time and patient was not getting messages. Now son is there all the time. Son is getting his meds organized again. They are out of rosuvastatin and would like a refill. I also discussed 100 ds.      Refill #[id number]    Last Visit: 10/11/23  Next Visit: 24    Gregory Nayak, PharmD, [address] Pharmacist  Department:

## 2023-10-17 DIAGNOSIS — F03.B18 MODERATE DEMENTIA WITH OTHER BEHAVIORAL DISTURBANCE, UNSPECIFIED DEMENTIA TYPE (HCC): Primary | ICD-10-CM

## 2023-10-17 RX ORDER — MEMANTINE HYDROCHLORIDE 28 MG/1
CAPSULE, EXTENDED RELEASE ORAL
Qty: 30 CAPSULE | Refills: 2 | Status: SHIPPED | OUTPATIENT
Start: 2023-10-17

## 2023-10-17 RX ORDER — MEMANTINE HYDROCHLORIDE 14 MG/1
CAPSULE, EXTENDED RELEASE ORAL
Qty: 7 CAPSULE | Refills: 0 | Status: SHIPPED | OUTPATIENT
Start: 2023-10-17

## 2023-10-17 RX ORDER — MEMANTINE HYDROCHLORIDE 7 MG/1
CAPSULE, EXTENDED RELEASE ORAL
Qty: 7 CAPSULE | Refills: 0 | Status: SHIPPED | OUTPATIENT
Start: 2023-10-17

## 2023-10-17 RX ORDER — MEMANTINE HYDROCHLORIDE 21 MG/1
CAPSULE, EXTENDED RELEASE ORAL
Qty: 7 CAPSULE | Refills: 0 | Status: SHIPPED | OUTPATIENT
Start: 2023-10-17

## 2023-10-17 NOTE — PROGRESS NOTES
Received fax from pharmacy that they do not have the namenda titration packs so pcp informed to send each individual script for titrating up to 28mg of the medication.

## 2023-10-18 ENCOUNTER — INITIAL CONSULT (OUTPATIENT)
Dept: SURGERY | Age: 78
End: 2023-10-18
Payer: MEDICARE

## 2023-10-18 VITALS
DIASTOLIC BLOOD PRESSURE: 82 MMHG | SYSTOLIC BLOOD PRESSURE: 134 MMHG | WEIGHT: 144.6 LBS | HEIGHT: 66 IN | BODY MASS INDEX: 23.24 KG/M2

## 2023-10-18 DIAGNOSIS — D48.5 NEOPLASM OF UNCERTAIN BEHAVIOR OF SKIN: Primary | ICD-10-CM

## 2023-10-18 PROCEDURE — 3074F SYST BP LT 130 MM HG: CPT | Performed by: SURGERY

## 2023-10-18 PROCEDURE — 1123F ACP DISCUSS/DSCN MKR DOCD: CPT | Performed by: SURGERY

## 2023-10-18 PROCEDURE — 99203 OFFICE O/P NEW LOW 30 MIN: CPT | Performed by: SURGERY

## 2023-10-18 PROCEDURE — 3078F DIAST BP <80 MM HG: CPT | Performed by: SURGERY

## 2023-10-19 DIAGNOSIS — L98.9 SKIN LESION OF FACE: ICD-10-CM

## 2023-10-19 DIAGNOSIS — L98.9 SKIN LESION OF NECK: ICD-10-CM

## 2023-10-31 NOTE — PROGRESS NOTES
PAT to completed with son and patient for local procedure aware of light breakfast may take home medications and will be holding Plavix 4 days prior to procedure but will remain on asa per MD.Elin Coates, RN

## 2023-11-06 ENCOUNTER — HOSPITAL ENCOUNTER (OUTPATIENT)
Age: 78
Setting detail: OUTPATIENT SURGERY
Discharge: HOME | End: 2023-11-06
Attending: SURGERY | Admitting: SURGERY
Payer: MEDICARE

## 2023-11-06 VITALS
OXYGEN SATURATION: 99 % | SYSTOLIC BLOOD PRESSURE: 151 MMHG | WEIGHT: 147 LBS | DIASTOLIC BLOOD PRESSURE: 70 MMHG | HEART RATE: 45 BPM | HEIGHT: 66 IN | TEMPERATURE: 97.5 F | RESPIRATION RATE: 11 BRPM | BODY MASS INDEX: 23.63 KG/M2

## 2023-11-06 DIAGNOSIS — L98.9 SKIN LESION: ICD-10-CM

## 2023-11-06 PROCEDURE — 6360000002 HC RX W HCPCS: Performed by: SURGERY

## 2023-11-06 PROCEDURE — 2709999900 HC NON-CHARGEABLE SUPPLY: Performed by: SURGERY

## 2023-11-06 PROCEDURE — 3600000012 HC SURGERY LEVEL 2 ADDTL 15MIN: Performed by: SURGERY

## 2023-11-06 PROCEDURE — 2580000003 HC RX 258: Performed by: SURGERY

## 2023-11-06 PROCEDURE — 88305 TISSUE EXAM BY PATHOLOGIST: CPT

## 2023-11-06 PROCEDURE — A4217 STERILE WATER/SALINE, 500 ML: HCPCS | Performed by: SURGERY

## 2023-11-06 PROCEDURE — 7100000010 HC PHASE II RECOVERY - FIRST 15 MIN: Performed by: SURGERY

## 2023-11-06 PROCEDURE — 11421 EXC H-F-NK-SP B9+MARG 0.6-1: CPT | Performed by: SURGERY

## 2023-11-06 PROCEDURE — 3600000002 HC SURGERY LEVEL 2 BASE: Performed by: SURGERY

## 2023-11-06 PROCEDURE — 2500000003 HC RX 250 WO HCPCS: Performed by: SURGERY

## 2023-11-06 PROCEDURE — 12042 INTMD RPR N-HF/GENIT2.6-7.5: CPT | Performed by: SURGERY

## 2023-11-06 RX ORDER — MAGNESIUM HYDROXIDE 1200 MG/15ML
LIQUID ORAL CONTINUOUS PRN
Status: COMPLETED | OUTPATIENT
Start: 2023-11-06 | End: 2023-11-06

## 2023-11-06 ASSESSMENT — PAIN DESCRIPTION - PAIN TYPE: TYPE: OTHER (COMMENT)

## 2023-11-06 ASSESSMENT — PAIN DESCRIPTION - LOCATION: LOCATION: NECK

## 2023-11-06 ASSESSMENT — PAIN DESCRIPTION - DESCRIPTORS: DESCRIPTORS: OTHER (COMMENT)

## 2023-11-06 ASSESSMENT — PAIN - FUNCTIONAL ASSESSMENT: PAIN_FUNCTIONAL_ASSESSMENT: NONE - DENIES PAIN

## 2023-11-06 ASSESSMENT — PAIN SCALES - GENERAL: PAINLEVEL_OUTOF10: 0

## 2023-11-06 ASSESSMENT — PAIN DESCRIPTION - ONSET: ONSET: OTHER (COMMENT)

## 2023-11-06 ASSESSMENT — PAIN DESCRIPTION - FREQUENCY: FREQUENCY: OTHER (COMMENT)

## 2023-11-06 ASSESSMENT — PAIN DESCRIPTION - ORIENTATION: ORIENTATION: RIGHT

## 2023-11-06 NOTE — BRIEF OP NOTE
Brief Postoperative Note      Patient: Ree Fiore  YOB: 1945  MRN: [id number]    Date of Procedure: 11/6/2023    Pre-Op Diagnosis Codes:     * Skin lesion [L98.9] NECK X 2    Post-Op Diagnosis: Same       Procedure(s):  EXCISION SKIN LESION RIGHT POSTERIOR NECK TIMES TWO    Surgeon(s):  Richar An MD    Assistant:  Surgical Assistant: Titi Bach    Anesthesia: Local    Estimated Blood Loss (mL): Minimal    Complications: None    Specimens:   ID Type Source Tests Collected by Time Destination   A : Right neck lateral lesion Tissue Tissue SURGICAL PATHOLOGY Richar An MD 11/6/2023 0746    B : Right neck medial lesion Tissue Tissue SURGICAL PATHOLOGY Richar An MD 11/6/2023 0747        Implants:  * No implants in log *      Drains: * No LDAs found *    Findings: As above  This procedure was not performed to treat primary cutaneous melanoma through wide local excision      Electronically signed by Richar An MD on 11/6/2023 at 7:51 AM

## 2023-11-06 NOTE — PROGRESS NOTES
Gave discharge instructions to patient, answered questions regarding restarting plavix and wound care, patient expressed understanding. Sent patient home with ice pack for comfort. Patient a/o x4, denies pain, able to ambulate without difficulty and is expressing desire to have a \"smoke\". Nurse encouraged patient to quit smoking and highlighted resources on d/c paperwork.

## 2023-11-08 ENCOUNTER — TELEPHONE (OUTPATIENT)
Dept: SURGERY | Age: 78
End: 2023-11-08

## 2023-11-08 NOTE — TELEPHONE ENCOUNTER
----- Message from Vibha Lynch MD sent at 11/7/2023  9:40 PM EST -----  Tell patient the lesions removed were benign. They were completely removed. No further treatment needed. Follow up prn.

## 2023-11-09 NOTE — OP NOTE
[address] [facility] [address], [address]                                OPERATIVE REPORT    PATIENT NAME: Creston Essex                   :        1945  MED REC NO:   [id number]                          ROOM:  ACCOUNT NO:   [de-identified]                           ADMIT DATE: 2023  PROVIDER:     Keily Thomas MD    DATE OF PROCEDURE:  2023    PREOPERATIVE DIAGNOSIS:  Skin neoplasm, uncertain behavior, right neck  x2. POSTOPERATIVE DIAGNOSIS:  Skin neoplasm, uncertain behavior, right neck  x2. OPERATION PERFORMED:  Excision of skin neoplasm, uncertain behavior,  right neck x2. SURGEON:  Keily Thomas MD.    ANESTHESIA:  Local.    COMPLICATION:  None. ESTIMATED BLOOD LOSS:  Less than 50 mL. INDICATIONS FOR THE OPERATION:  A 80-year-old male with two raised,  crusted, ulcerated lesions on the right neck. These were suspicious. They were both causing acute pain and skin sensation disturbance. I  recommended operative excision. The risks and benefits were explained. The patient understood them, accepted them, and elected to proceed. DESCRIPTION OF OPERATION:  The patient was brought to the operating  room. He was prepped and draped in the usual surgical sterile fashion. Local anesthetic was infiltrated at both operative sites on the right  lateral neck. Elliptical incisions were made around two lesions. The  right neck medial lesion had an excised diameter of 1 cm. The incision  length was 2.5 cm. Full thickness excisions were performed at both  operative sites. Hemostasis was obtained. Intermediate type closures  were performed by closing both the superficial fascia and subcutaneous  tissue with 3-O-Vicryl suture. 4-O-Vicryl was used to reapproximate the  skin. Benzoin and Steri-Strip dressing were placed.     DISPOSITION:  The patient tolerated the procedure without any acute  complication.         Gabriella Mei MD    D: 11/09/2023 7:20:10       T: 11/09/2023 10:03:15     [email or web address]  Job#: [id number]     Doc#: [id number]    CC:

## 2024-01-17 ENCOUNTER — OFFICE VISIT (OUTPATIENT)
Dept: FAMILY MEDICINE CLINIC | Age: 79
End: 2024-01-17

## 2024-01-17 VITALS
DIASTOLIC BLOOD PRESSURE: 74 MMHG | BODY MASS INDEX: 23.57 KG/M2 | WEIGHT: 146 LBS | SYSTOLIC BLOOD PRESSURE: 139 MMHG | HEART RATE: 61 BPM | OXYGEN SATURATION: 95 %

## 2024-01-17 DIAGNOSIS — E55.9 VITAMIN D DEFICIENCY: ICD-10-CM

## 2024-01-17 DIAGNOSIS — G30.9 ALZHEIMER DISEASE (HCC): Primary | ICD-10-CM

## 2024-01-17 DIAGNOSIS — D69.6 ACQUIRED THROMBOCYTOPENIA (HCC): ICD-10-CM

## 2024-01-17 DIAGNOSIS — F03.B18 MODERATE DEMENTIA WITH OTHER BEHAVIORAL DISTURBANCE, UNSPECIFIED DEMENTIA TYPE (HCC): ICD-10-CM

## 2024-01-17 DIAGNOSIS — N18.30 STAGE 3 CHRONIC KIDNEY DISEASE, UNSPECIFIED WHETHER STAGE 3A OR 3B CKD (HCC): ICD-10-CM

## 2024-01-17 DIAGNOSIS — F02.80 ALZHEIMER DISEASE (HCC): Primary | ICD-10-CM

## 2024-01-17 DIAGNOSIS — N18.31 STAGE 3A CHRONIC KIDNEY DISEASE (HCC): ICD-10-CM

## 2024-01-17 DIAGNOSIS — Z72.0 TOBACCO ABUSE: ICD-10-CM

## 2024-01-17 ASSESSMENT — PATIENT HEALTH QUESTIONNAIRE - PHQ9
SUM OF ALL RESPONSES TO PHQ QUESTIONS 1-9: 0
SUM OF ALL RESPONSES TO PHQ QUESTIONS 1-9: 0
1. LITTLE INTEREST OR PLEASURE IN DOING THINGS: 0
SUM OF ALL RESPONSES TO PHQ QUESTIONS 1-9: 0
SUM OF ALL RESPONSES TO PHQ QUESTIONS 1-9: 0
2. FEELING DOWN, DEPRESSED OR HOPELESS: 0
SUM OF ALL RESPONSES TO PHQ9 QUESTIONS 1 & 2: 0

## 2024-01-17 NOTE — PROGRESS NOTES
Chief Complaint   Patient presents with    Hypertension        Internal Administration   First Dose COVID-19, PFIZER PURPLE top, DILUTE for use, (age 12 y+), 30mcg/0.3mL  2021   Second Dose COVID-19, PFIZER PURPLE top, DILUTE for use, (age 12 y+), 30mcg/0.3mL   2021       Last COVID Lab No results found for: \"SARS-COV-2\", \"SARS-COV-2 RNA\", \"SARS-COV-2 RNA, RT PCR\", \"SARS-COV-2, LAYLA\", \"SARS-COV-2, NAAT\", \"SARS-COV-2 BY PCR\", \"POC\", \"SARS-COV-2, POC\", \"RAPID\", \"SARS-COV-2, RAPID\", \"SALIVA\", \"SARS-COV-2, SALIVA\"          Wt Readings from Last 3 Encounters:   23 66.7 kg (147 lb)   10/18/23 65.6 kg (144 lb 9.6 oz)   10/11/23 66.7 kg (147 lb)     BP Readings from Last 3 Encounters:   23 (!) 151/70   10/18/23 134/82   10/11/23 138/82      Lab Results   Component Value Date    LABA1C 5.4 2023    LABA1C 5.7 2022       HPI:  Jacob Rosales is a 78 y.o. (: 1945) here today for    Follow up on chronic conditoins.     Does not monitor his BP at home.     Continues to care for him self and doesn't allow the family to help him much.     Continues to smoke.     Takes medications daily without missing doses.     He did follow up with general surg to have the spot on his neck removed and is doing fine.     [] Patient has completed an advance directive  [] Patient has NOT completed an advanced directive  [] Patient has a documented healthcare surrogate  [] Patient does NOT have a documented healthcare surrogate  [] Discussed the importance of establishing and updating an advanced directive.  Patient has questions at this time and those were answered.  [] Discussed the importance of establishing and updating an advanced directive.  Patient does NOT have questions at this time.    Discussed with: [] Patient            [] Family             [] Other caregiver    Patient's medications, allergies, past medical, surgical, social and family histories were reviewed and updated asappropriate on

## 2024-04-17 ENCOUNTER — OFFICE VISIT (OUTPATIENT)
Dept: FAMILY MEDICINE CLINIC | Age: 79
End: 2024-04-17

## 2024-04-17 VITALS
OXYGEN SATURATION: 93 % | BODY MASS INDEX: 23.4 KG/M2 | HEART RATE: 55 BPM | SYSTOLIC BLOOD PRESSURE: 139 MMHG | DIASTOLIC BLOOD PRESSURE: 72 MMHG | WEIGHT: 145 LBS

## 2024-04-17 DIAGNOSIS — Z87.891 PERSONAL HISTORY OF TOBACCO USE: ICD-10-CM

## 2024-04-17 DIAGNOSIS — R73.9 HYPERGLYCEMIA: ICD-10-CM

## 2024-04-17 DIAGNOSIS — N18.31 STAGE 3A CHRONIC KIDNEY DISEASE (HCC): ICD-10-CM

## 2024-04-17 DIAGNOSIS — Z72.0 TOBACCO ABUSE: ICD-10-CM

## 2024-04-17 DIAGNOSIS — R97.20 ELEVATED PSA: ICD-10-CM

## 2024-04-17 DIAGNOSIS — Z13.29 SCREENING FOR THYROID DISORDER: ICD-10-CM

## 2024-04-17 DIAGNOSIS — F02.80 ALZHEIMER DISEASE (HCC): Primary | ICD-10-CM

## 2024-04-17 DIAGNOSIS — E55.9 VITAMIN D DEFICIENCY: ICD-10-CM

## 2024-04-17 DIAGNOSIS — G30.9 ALZHEIMER DISEASE (HCC): Primary | ICD-10-CM

## 2024-04-17 DIAGNOSIS — I10 ESSENTIAL HYPERTENSION, BENIGN: ICD-10-CM

## 2024-04-17 DIAGNOSIS — I25.83 CORONARY ARTERY DISEASE DUE TO LIPID RICH PLAQUE: ICD-10-CM

## 2024-04-17 DIAGNOSIS — I25.10 CORONARY ARTERY DISEASE DUE TO LIPID RICH PLAQUE: ICD-10-CM

## 2024-04-17 DIAGNOSIS — E78.2 MIXED HYPERLIPIDEMIA: ICD-10-CM

## 2024-04-17 LAB
ALBUMIN SERPL-MCNC: 4.3 G/DL (ref 3.4–5)
ALBUMIN/GLOB SERPL: 1.4 {RATIO} (ref 1.1–2.2)
ALP SERPL-CCNC: 104 U/L (ref 40–129)
ALT SERPL-CCNC: 19 U/L (ref 10–40)
ANION GAP SERPL CALCULATED.3IONS-SCNC: 16 MMOL/L (ref 3–16)
AST SERPL-CCNC: 23 U/L (ref 15–37)
BASOPHILS # BLD: 0.1 K/UL (ref 0–0.2)
BASOPHILS NFR BLD: 1.2 %
BILIRUB SERPL-MCNC: 0.4 MG/DL (ref 0–1)
BUN SERPL-MCNC: 27 MG/DL (ref 7–20)
CALCIUM SERPL-MCNC: 9.6 MG/DL (ref 8.3–10.6)
CHLORIDE SERPL-SCNC: 110 MMOL/L (ref 99–110)
CHOLEST SERPL-MCNC: 90 MG/DL (ref 0–199)
CO2 SERPL-SCNC: 22 MMOL/L (ref 21–32)
CREAT SERPL-MCNC: 1.6 MG/DL (ref 0.8–1.3)
DEPRECATED RDW RBC AUTO: 18.5 % (ref 12.4–15.4)
EOSINOPHIL # BLD: 0.5 K/UL (ref 0–0.6)
EOSINOPHIL NFR BLD: 9.6 %
GFR SERPLBLD CREATININE-BSD FMLA CKD-EPI: 44 ML/MIN/{1.73_M2}
GLUCOSE SERPL-MCNC: 93 MG/DL (ref 70–99)
HCT VFR BLD AUTO: 35.7 % (ref 40.5–52.5)
HDLC SERPL-MCNC: 40 MG/DL (ref 40–60)
HGB BLD-MCNC: 11.8 G/DL (ref 13.5–17.5)
LDLC SERPL CALC-MCNC: 40 MG/DL
LYMPHOCYTES # BLD: 0.7 K/UL (ref 1–5.1)
LYMPHOCYTES NFR BLD: 12.1 %
MCH RBC QN AUTO: 28.8 PG (ref 26–34)
MCHC RBC AUTO-ENTMCNC: 33.1 G/DL (ref 31–36)
MCV RBC AUTO: 86.8 FL (ref 80–100)
MONOCYTES # BLD: 0.4 K/UL (ref 0–1.3)
MONOCYTES NFR BLD: 6.8 %
NEUTROPHILS # BLD: 3.8 K/UL (ref 1.7–7.7)
NEUTROPHILS NFR BLD: 70.3 %
PLATELET # BLD AUTO: 176 K/UL (ref 135–450)
PMV BLD AUTO: 9.5 FL (ref 5–10.5)
POTASSIUM SERPL-SCNC: 5 MMOL/L (ref 3.5–5.1)
PROT SERPL-MCNC: 7.3 G/DL (ref 6.4–8.2)
PSA SERPL DL<=0.01 NG/ML-MCNC: 5.48 NG/ML (ref 0–4)
RBC # BLD AUTO: 4.12 M/UL (ref 4.2–5.9)
SODIUM SERPL-SCNC: 148 MMOL/L (ref 136–145)
TRIGL SERPL-MCNC: 51 MG/DL (ref 0–150)
TSH SERPL DL<=0.005 MIU/L-ACNC: 3.29 UIU/ML (ref 0.27–4.2)
VLDLC SERPL CALC-MCNC: 10 MG/DL
WBC # BLD AUTO: 5.5 K/UL (ref 4–11)

## 2024-04-17 SDOH — ECONOMIC STABILITY: FOOD INSECURITY: WITHIN THE PAST 12 MONTHS, THE FOOD YOU BOUGHT JUST DIDN'T LAST AND YOU DIDN'T HAVE MONEY TO GET MORE.: NEVER TRUE

## 2024-04-17 SDOH — ECONOMIC STABILITY: FOOD INSECURITY: WITHIN THE PAST 12 MONTHS, YOU WORRIED THAT YOUR FOOD WOULD RUN OUT BEFORE YOU GOT MONEY TO BUY MORE.: NEVER TRUE

## 2024-04-17 SDOH — ECONOMIC STABILITY: INCOME INSECURITY: HOW HARD IS IT FOR YOU TO PAY FOR THE VERY BASICS LIKE FOOD, HOUSING, MEDICAL CARE, AND HEATING?: NOT HARD AT ALL

## 2024-04-17 NOTE — PATIENT INSTRUCTIONS
Not feeling your best?  Where to go for the right care at the right time.    Dear Jacob Rosales   I wanted to provide you with some information that might help you seek care for your condition when your primary care provider or specialist is unavailable. If you have a need outside of normal business hours, you should first contact your primary care office or specialist caring for your condition. They may have on-call providers that could assist with your care. During office hours, you may request a virtual or same day appointment.   But what if your primary care provider is not in the office that day and you can't wait until the  next day for care? In that situation, your next option is to visit an urgent care facility.          [facility] now has urgent care sites open to support our community.   [facility] is a great alternative when you need immediate medical care that is not a serious threat to your health or your doctor's office is closed or unable to get you in for an appointment. The urgent care centers offer fast access to [facility] doctors for minor illnesses and injuries for patients of all ages. There are other medical services available including lab testing, X-rays, EKGs, and IV fluids.  Locations are open daily from 8 a.m. - 8 p.m.     [facility]  [address], [place], [place] [address]  [phone]    [address], [place], [place] [address]  [phone]    Local Urgent Care     [facility] 8:30 am - 7:70 pm   [address] [place] [address]  [phone]    [facility]     8 am - 8 pm   [address] [place] [address]  [phone]    [facility] [place] 7 am - 7:30 pm  [address] [place] [address]  [phone]     [facility] [place] 7 am - 7:30 pm  [address] [place] [address]  [phone]    Darius

## 2024-04-17 NOTE — PROGRESS NOTES
false positive rate, and total radiation exposure.  The patient currently exhibits no signs or symptoms suggestive of lung cancer.  Discussed with patient the importance of compliance with yearly annual lung cancer screenings and willingness to undergo diagnosis and treatment if screening scan is positive.  In addition, the patient was counseled regarding the importance of remaining smoke free and/or total smoking cessation.    Also reviewed the following if the patient has Medicare that as of February 10, 2022, Medicare only covers LDCT screening in patients aged 50-77 with at least a 20 pack-year smoking history who currently smoke or have quit in the last 15 years

## 2024-04-18 LAB
EST. AVERAGE GLUCOSE BLD GHB EST-MCNC: 128.4 MG/DL
HBA1C MFR BLD: 6.1 %

## 2024-04-22 DIAGNOSIS — D50.8 IRON DEFICIENCY ANEMIA SECONDARY TO INADEQUATE DIETARY IRON INTAKE: Primary | ICD-10-CM

## 2024-04-22 DIAGNOSIS — D64.9 ANEMIA, UNSPECIFIED TYPE: ICD-10-CM

## 2024-04-22 DIAGNOSIS — R79.89 ABNORMAL CBC: ICD-10-CM

## 2024-04-22 DIAGNOSIS — R79.89 ABNORMAL CBC: Primary | ICD-10-CM

## 2024-04-22 LAB
FERRITIN SERPL IA-MCNC: 185.1 NG/ML (ref 30–400)
IRON SATN MFR SERPL: 18 % (ref 20–50)
IRON SERPL-MCNC: 53 UG/DL (ref 59–158)
TIBC SERPL-MCNC: 290 UG/DL (ref 260–445)

## 2024-04-22 RX ORDER — FERROUS SULFATE 325(65) MG
325 TABLET ORAL 2 TIMES DAILY
Qty: 180 TABLET | Refills: 0 | Status: SHIPPED | OUTPATIENT
Start: 2024-04-22 | End: 2024-04-22

## 2024-04-22 RX ORDER — FERROUS SULFATE 325(65) MG
325 TABLET ORAL
Qty: 90 TABLET | Refills: 0 | Status: SHIPPED | OUTPATIENT
Start: 2024-04-22

## 2024-04-29 ENCOUNTER — TELEPHONE (OUTPATIENT)
Dept: PHARMACY | Facility: CLINIC | Age: 79
End: 2024-04-29

## 2024-06-11 ENCOUNTER — TELEPHONE (OUTPATIENT)
Dept: FAMILY MEDICINE CLINIC | Age: 79
End: 2024-06-11

## 2024-06-11 NOTE — TELEPHONE ENCOUNTER
Patient recently had an in home health assessment provided by his [employer] insurance.  Did a PAD test on 6.5.24.  Results showed abnormal on L and R side.  Provider can call [phone] M_F, 8-5 to discuss.  Patient encouraged to call and make an appointment with his provider.

## 2024-06-18 ENCOUNTER — TELEPHONE (OUTPATIENT)
Dept: FAMILY MEDICINE CLINIC | Age: 79
End: 2024-06-18

## 2024-06-18 DIAGNOSIS — I73.9 PAD (PERIPHERAL ARTERY DISEASE) (HCC): Primary | ICD-10-CM

## 2024-06-18 NOTE — TELEPHONE ENCOUNTER
Order placed per pcp verbal order based on signify health pad results. Pts son called and informed of test and provided [facility] scheduling information

## 2024-06-19 ENCOUNTER — HOSPITAL ENCOUNTER (INPATIENT)
Age: 79
LOS: 3 days | Discharge: HOSPICE | End: 2024-06-22
Attending: INTERNAL MEDICINE | Admitting: INTERNAL MEDICINE
Payer: MEDICARE

## 2024-06-19 ENCOUNTER — APPOINTMENT (OUTPATIENT)
Dept: CT IMAGING | Age: 79
End: 2024-06-19
Payer: MEDICARE

## 2024-06-19 ENCOUNTER — HOSPITAL ENCOUNTER (EMERGENCY)
Age: 79
Discharge: ANOTHER HOSPITAL | End: 2024-06-19
Attending: EMERGENCY MEDICINE
Payer: MEDICARE

## 2024-06-19 ENCOUNTER — TELEPHONE (OUTPATIENT)
Dept: FAMILY MEDICINE CLINIC | Age: 79
End: 2024-06-19

## 2024-06-19 ENCOUNTER — APPOINTMENT (OUTPATIENT)
Dept: MRI IMAGING | Age: 79
End: 2024-06-19
Attending: INTERNAL MEDICINE
Payer: MEDICARE

## 2024-06-19 ENCOUNTER — APPOINTMENT (OUTPATIENT)
Dept: GENERAL RADIOLOGY | Age: 79
End: 2024-06-19
Payer: MEDICARE

## 2024-06-19 VITALS
BODY MASS INDEX: 21.6 KG/M2 | DIASTOLIC BLOOD PRESSURE: 88 MMHG | SYSTOLIC BLOOD PRESSURE: 132 MMHG | WEIGHT: 133.8 LBS | RESPIRATION RATE: 16 BRPM | TEMPERATURE: 97.8 F | OXYGEN SATURATION: 98 % | HEART RATE: 46 BPM

## 2024-06-19 DIAGNOSIS — N28.9 KIDNEY LESION: ICD-10-CM

## 2024-06-19 DIAGNOSIS — G93.89 FRONTAL MASS OF BRAIN: ICD-10-CM

## 2024-06-19 DIAGNOSIS — C79.31 METASTATIC CANCER TO BRAIN (HCC): Primary | ICD-10-CM

## 2024-06-19 DIAGNOSIS — R79.89 ELEVATED TROPONIN: ICD-10-CM

## 2024-06-19 DIAGNOSIS — N17.9 AKI (ACUTE KIDNEY INJURY) (HCC): Primary | ICD-10-CM

## 2024-06-19 DIAGNOSIS — R91.8 LUNG MASS: ICD-10-CM

## 2024-06-19 DIAGNOSIS — R91.1 PULMONARY NODULE: ICD-10-CM

## 2024-06-19 LAB
ALBUMIN SERPL-MCNC: 2.8 G/DL (ref 3.4–5)
ALBUMIN/GLOB SERPL: 0.6 {RATIO} (ref 1.1–2.2)
ALP SERPL-CCNC: 102 U/L (ref 40–129)
ALT SERPL-CCNC: 92 U/L (ref 10–40)
ANION GAP SERPL CALCULATED.3IONS-SCNC: 14 MMOL/L (ref 3–16)
AST SERPL-CCNC: 98 U/L (ref 15–37)
BACTERIA URNS QL MICRO: ABNORMAL /HPF
BASOPHILS # BLD: 0 K/UL (ref 0–0.2)
BASOPHILS NFR BLD: 0.7 %
BILIRUB SERPL-MCNC: 0.4 MG/DL (ref 0–1)
BILIRUB UR QL STRIP.AUTO: NEGATIVE
BUN SERPL-MCNC: 33 MG/DL (ref 7–20)
CALCIUM SERPL-MCNC: 9.2 MG/DL (ref 8.3–10.6)
CHLORIDE SERPL-SCNC: 102 MMOL/L (ref 99–110)
CLARITY UR: CLEAR
CO2 SERPL-SCNC: 20 MMOL/L (ref 21–32)
COARSE GRAN CASTS #/AREA URNS LPF: ABNORMAL /LPF (ref 0–2)
COLOR UR: YELLOW
CREAT SERPL-MCNC: 2.2 MG/DL (ref 0.8–1.3)
DEPRECATED RDW RBC AUTO: 19.1 % (ref 12.4–15.4)
EKG ATRIAL RATE: 51 BPM
EKG DIAGNOSIS: NORMAL
EKG P AXIS: 72 DEGREES
EKG P-R INTERVAL: 146 MS
EKG Q-T INTERVAL: 502 MS
EKG QRS DURATION: 132 MS
EKG QTC CALCULATION (BAZETT): 462 MS
EKG R AXIS: 35 DEGREES
EKG T AXIS: 42 DEGREES
EKG VENTRICULAR RATE: 51 BPM
EOSINOPHIL # BLD: 0.3 K/UL (ref 0–0.6)
EOSINOPHIL NFR BLD: 4.6 %
EPI CELLS #/AREA URNS HPF: ABNORMAL /HPF (ref 0–5)
GFR SERPLBLD CREATININE-BSD FMLA CKD-EPI: 30 ML/MIN/{1.73_M2}
GLUCOSE SERPL-MCNC: 113 MG/DL (ref 70–99)
GLUCOSE UR STRIP.AUTO-MCNC: NEGATIVE MG/DL
HCT VFR BLD AUTO: 34.2 % (ref 40.5–52.5)
HGB BLD-MCNC: 11.2 G/DL (ref 13.5–17.5)
HGB UR QL STRIP.AUTO: ABNORMAL
HYALINE CASTS #/AREA URNS LPF: ABNORMAL /LPF (ref 0–2)
KETONES UR STRIP.AUTO-MCNC: NEGATIVE MG/DL
LEUKOCYTE ESTERASE UR QL STRIP.AUTO: NEGATIVE
LYMPHOCYTES # BLD: 0.5 K/UL (ref 1–5.1)
LYMPHOCYTES NFR BLD: 7.4 %
MCH RBC QN AUTO: 27.3 PG (ref 26–34)
MCHC RBC AUTO-ENTMCNC: 32.8 G/DL (ref 31–36)
MCV RBC AUTO: 83.1 FL (ref 80–100)
MONOCYTES # BLD: 0.3 K/UL (ref 0–1.3)
MONOCYTES NFR BLD: 4.5 %
MUCOUS THREADS #/AREA URNS LPF: ABNORMAL /LPF
NEUTROPHILS # BLD: 5.4 K/UL (ref 1.7–7.7)
NEUTROPHILS NFR BLD: 82.8 %
NITRITE UR QL STRIP.AUTO: NEGATIVE
NT-PROBNP SERPL-MCNC: 632 PG/ML (ref 0–449)
PH UR STRIP.AUTO: 6 [PH] (ref 5–8)
PLATELET # BLD AUTO: 234 K/UL (ref 135–450)
PMV BLD AUTO: 8.6 FL (ref 5–10.5)
POTASSIUM SERPL-SCNC: 5 MMOL/L (ref 3.5–5.1)
PROT SERPL-MCNC: 7.2 G/DL (ref 6.4–8.2)
PROT UR STRIP.AUTO-MCNC: 100 MG/DL
RBC # BLD AUTO: 4.12 M/UL (ref 4.2–5.9)
RBC #/AREA URNS HPF: ABNORMAL /HPF (ref 0–4)
REASON FOR REJECTION: NORMAL
REJECTED TEST: NORMAL
SODIUM SERPL-SCNC: 136 MMOL/L (ref 136–145)
SP GR UR STRIP.AUTO: 1.01 (ref 1–1.03)
TROPONIN, HIGH SENSITIVITY: 36 NG/L (ref 0–22)
TROPONIN, HIGH SENSITIVITY: 41 NG/L (ref 0–22)
UA COMPLETE W REFLEX CULTURE PNL UR: ABNORMAL
UA DIPSTICK W REFLEX MICRO PNL UR: YES
URN SPEC COLLECT METH UR: ABNORMAL
UROBILINOGEN UR STRIP-ACNC: 0.2 E.U./DL
WBC # BLD AUTO: 6.5 K/UL (ref 4–11)
WBC #/AREA URNS HPF: ABNORMAL /HPF (ref 0–5)

## 2024-06-19 PROCEDURE — C9113 INJ PANTOPRAZOLE SODIUM, VIA: HCPCS

## 2024-06-19 PROCEDURE — 71250 CT THORAX DX C-: CPT

## 2024-06-19 PROCEDURE — 72125 CT NECK SPINE W/O DYE: CPT

## 2024-06-19 PROCEDURE — 84484 ASSAY OF TROPONIN QUANT: CPT

## 2024-06-19 PROCEDURE — 36415 COLL VENOUS BLD VENIPUNCTURE: CPT

## 2024-06-19 PROCEDURE — 70450 CT HEAD/BRAIN W/O DYE: CPT

## 2024-06-19 PROCEDURE — 2580000003 HC RX 258

## 2024-06-19 PROCEDURE — 80053 COMPREHEN METABOLIC PANEL: CPT

## 2024-06-19 PROCEDURE — 93005 ELECTROCARDIOGRAM TRACING: CPT | Performed by: REGISTERED NURSE

## 2024-06-19 PROCEDURE — 99285 EMERGENCY DEPT VISIT HI MDM: CPT

## 2024-06-19 PROCEDURE — 70551 MRI BRAIN STEM W/O DYE: CPT

## 2024-06-19 PROCEDURE — 93010 ELECTROCARDIOGRAM REPORT: CPT | Performed by: INTERNAL MEDICINE

## 2024-06-19 PROCEDURE — 6360000002 HC RX W HCPCS

## 2024-06-19 PROCEDURE — 85025 COMPLETE CBC W/AUTO DIFF WBC: CPT

## 2024-06-19 PROCEDURE — P9047 ALBUMIN (HUMAN), 25%, 50ML: HCPCS | Performed by: FAMILY MEDICINE

## 2024-06-19 PROCEDURE — 83880 ASSAY OF NATRIURETIC PEPTIDE: CPT

## 2024-06-19 PROCEDURE — 81001 URINALYSIS AUTO W/SCOPE: CPT

## 2024-06-19 PROCEDURE — 2580000003 HC RX 258: Performed by: REGISTERED NURSE

## 2024-06-19 PROCEDURE — 2060000000 HC ICU INTERMEDIATE R&B

## 2024-06-19 PROCEDURE — 6360000002 HC RX W HCPCS: Performed by: FAMILY MEDICINE

## 2024-06-19 RX ORDER — LEVETIRACETAM 500 MG/5ML
500 INJECTION, SOLUTION, CONCENTRATE INTRAVENOUS EVERY 12 HOURS
Status: DISCONTINUED | OUTPATIENT
Start: 2024-06-20 | End: 2024-06-22 | Stop reason: HOSPADM

## 2024-06-19 RX ORDER — LEVETIRACETAM 500 MG/5ML
1000 INJECTION, SOLUTION, CONCENTRATE INTRAVENOUS ONCE
Status: COMPLETED | OUTPATIENT
Start: 2024-06-19 | End: 2024-06-19

## 2024-06-19 RX ORDER — ACETAMINOPHEN 325 MG/1
650 TABLET ORAL EVERY 6 HOURS PRN
Status: DISCONTINUED | OUTPATIENT
Start: 2024-06-19 | End: 2024-06-22 | Stop reason: HOSPADM

## 2024-06-19 RX ORDER — ROSUVASTATIN CALCIUM 20 MG/1
40 TABLET, COATED ORAL NIGHTLY
Status: DISCONTINUED | OUTPATIENT
Start: 2024-06-20 | End: 2024-06-22

## 2024-06-19 RX ORDER — SODIUM CHLORIDE 9 MG/ML
INJECTION, SOLUTION INTRAVENOUS PRN
Status: DISCONTINUED | OUTPATIENT
Start: 2024-06-19 | End: 2024-06-22 | Stop reason: HOSPADM

## 2024-06-19 RX ORDER — SODIUM CHLORIDE 0.9 % (FLUSH) 0.9 %
5-40 SYRINGE (ML) INJECTION PRN
Status: DISCONTINUED | OUTPATIENT
Start: 2024-06-19 | End: 2024-06-22 | Stop reason: HOSPADM

## 2024-06-19 RX ORDER — ACETAMINOPHEN 650 MG/1
650 SUPPOSITORY RECTAL EVERY 6 HOURS PRN
Status: DISCONTINUED | OUTPATIENT
Start: 2024-06-19 | End: 2024-06-22 | Stop reason: HOSPADM

## 2024-06-19 RX ORDER — POLYETHYLENE GLYCOL 3350 17 G/17G
17 POWDER, FOR SOLUTION ORAL DAILY PRN
Status: DISCONTINUED | OUTPATIENT
Start: 2024-06-19 | End: 2024-06-22 | Stop reason: HOSPADM

## 2024-06-19 RX ORDER — DEXAMETHASONE SODIUM PHOSPHATE 4 MG/ML
10 INJECTION, SOLUTION INTRA-ARTICULAR; INTRALESIONAL; INTRAMUSCULAR; INTRAVENOUS; SOFT TISSUE ONCE
Status: COMPLETED | OUTPATIENT
Start: 2024-06-19 | End: 2024-06-19

## 2024-06-19 RX ORDER — ALBUMIN (HUMAN) 12.5 G/50ML
25 SOLUTION INTRAVENOUS ONCE
Status: COMPLETED | OUTPATIENT
Start: 2024-06-19 | End: 2024-06-20

## 2024-06-19 RX ORDER — DEXAMETHASONE SODIUM PHOSPHATE 4 MG/ML
4 INJECTION, SOLUTION INTRA-ARTICULAR; INTRALESIONAL; INTRAMUSCULAR; INTRAVENOUS; SOFT TISSUE EVERY 6 HOURS
Status: DISCONTINUED | OUTPATIENT
Start: 2024-06-20 | End: 2024-06-22 | Stop reason: HOSPADM

## 2024-06-19 RX ORDER — SODIUM CHLORIDE, SODIUM LACTATE, POTASSIUM CHLORIDE, CALCIUM CHLORIDE 600; 310; 30; 20 MG/100ML; MG/100ML; MG/100ML; MG/100ML
INJECTION, SOLUTION INTRAVENOUS CONTINUOUS
Status: ACTIVE | OUTPATIENT
Start: 2024-06-19 | End: 2024-06-20

## 2024-06-19 RX ORDER — 0.9 % SODIUM CHLORIDE 0.9 %
1000 INTRAVENOUS SOLUTION INTRAVENOUS ONCE
Status: COMPLETED | OUTPATIENT
Start: 2024-06-19 | End: 2024-06-19

## 2024-06-19 RX ORDER — AMLODIPINE BESYLATE 10 MG/1
10 TABLET ORAL DAILY
Status: DISCONTINUED | OUTPATIENT
Start: 2024-06-20 | End: 2024-06-22

## 2024-06-19 RX ORDER — ENOXAPARIN SODIUM 100 MG/ML
30 INJECTION SUBCUTANEOUS DAILY
Status: DISCONTINUED | OUTPATIENT
Start: 2024-06-20 | End: 2024-06-22

## 2024-06-19 RX ORDER — PANTOPRAZOLE SODIUM 40 MG/10ML
40 INJECTION, POWDER, LYOPHILIZED, FOR SOLUTION INTRAVENOUS DAILY
Status: DISCONTINUED | OUTPATIENT
Start: 2024-06-19 | End: 2024-06-22 | Stop reason: HOSPADM

## 2024-06-19 RX ORDER — SODIUM CHLORIDE 0.9 % (FLUSH) 0.9 %
5-40 SYRINGE (ML) INJECTION EVERY 12 HOURS SCHEDULED
Status: DISCONTINUED | OUTPATIENT
Start: 2024-06-19 | End: 2024-06-22 | Stop reason: HOSPADM

## 2024-06-19 RX ADMIN — ALBUMIN (HUMAN) 25 G: 0.25 INJECTION, SOLUTION INTRAVENOUS at 23:40

## 2024-06-19 RX ADMIN — SODIUM CHLORIDE, POTASSIUM CHLORIDE, SODIUM LACTATE AND CALCIUM CHLORIDE: 600; 310; 30; 20 INJECTION, SOLUTION INTRAVENOUS at 23:34

## 2024-06-19 RX ADMIN — DEXAMETHASONE SODIUM PHOSPHATE 10 MG: 4 INJECTION, SOLUTION INTRAMUSCULAR; INTRAVENOUS at 23:42

## 2024-06-19 RX ADMIN — PANTOPRAZOLE SODIUM 40 MG: 40 INJECTION, POWDER, FOR SOLUTION INTRAVENOUS at 23:42

## 2024-06-19 RX ADMIN — LEVETIRACETAM 1000 MG: 500 INJECTION INTRAVENOUS at 23:41

## 2024-06-19 RX ADMIN — SODIUM CHLORIDE 1000 ML: 9 INJECTION, SOLUTION INTRAVENOUS at 14:02

## 2024-06-19 ASSESSMENT — ENCOUNTER SYMPTOMS
SHORTNESS OF BREATH: 0
CHEST TIGHTNESS: 0
ABDOMINAL PAIN: 0

## 2024-06-19 ASSESSMENT — PAIN - FUNCTIONAL ASSESSMENT: PAIN_FUNCTIONAL_ASSESSMENT: NONE - DENIES PAIN

## 2024-06-19 NOTE — ED NOTES
[facility] called back with Hospitalists from [facility] on the line speaking with Roz Eller, CNP

## 2024-06-19 NOTE — ED NOTES
Ambulated this patient around the ER patient went from 100 to 97 while walking, but maintained his 97%

## 2024-06-19 NOTE — ED TRIAGE NOTES
Son is here with pt.  States pt does have some dementia.  Son has noticed some lethargy recently.  States he did not get up much yesterday.  Has been losing his footing a little bit.  Pt ambulatory back to room.  Is not sob or labored with his breathing.  Son states he is not eating or drinking like he should and wants him checked out in case something is brewing.

## 2024-06-19 NOTE — ED PROVIDER NOTES
Cranial Nerves: Cranial nerves 2-12 are intact. No cranial nerve deficit, dysarthria or facial asymmetry.      Sensory: Sensation is intact. No sensory deficit.      Motor: Motor function is intact. No weakness, tremor, atrophy, abnormal muscle tone, seizure activity or pronator drift.      Coordination: Coordination is intact. Coordination normal. Finger-Nose-Finger Test and Heel to Shin Test normal. Rapid alternating movements normal.      Gait: Gait is intact.   Psychiatric:         Mood and Affect: Mood normal.         Behavior: Behavior normal.       PHYSICAL EXAM  /88   Pulse (!) 46   Temp 97.8 °F (36.6 °C) (Oral)   Resp 16   Wt 60.7 kg (133 lb 12.8 oz)   SpO2 98%   BMI 21.60 kg/m²       DIAGNOSTIC RESULTS   LABS:    Labs Reviewed   CBC WITH AUTO DIFFERENTIAL - Abnormal; Notable for the following components:       Result Value    RBC 4.12 (*)     Hemoglobin 11.2 (*)     Hematocrit 34.2 (*)     RDW 19.1 (*)     Lymphocytes Absolute 0.5 (*)     All other components within normal limits   TROPONIN - Abnormal; Notable for the following components:    Troponin, High Sensitivity 36 (*)     All other components within normal limits   URINALYSIS WITH REFLEX TO CULTURE - Abnormal; Notable for the following components:    Blood, Urine SMALL (*)     Protein,  (*)     All other components within normal limits   COMPREHENSIVE METABOLIC PANEL W/ REFLEX TO MG FOR LOW K - Abnormal; Notable for the following components:    CO2 20 (*)     Glucose 113 (*)     BUN 33 (*)     Creatinine 2.2 (*)     Est, Glom Filt Rate 30 (*)     Albumin 2.8 (*)     Albumin/Globulin Ratio 0.6 (*)     ALT 92 (*)     AST 98 (*)     All other components within normal limits   TROPONIN - Abnormal; Notable for the following components:    Troponin, High Sensitivity 41 (*)     All other components within normal limits   BRAIN NATRIURETIC PEPTIDE - Abnormal; Notable for the following components:    Pro- (*)     All other  enlarged mediastinal lymph nodes, indeterminate lesion to the kidney    Orthostatic blood pressure reveal lying blood pressure 122/71 with pulse of 47.  Blood pressure sitting 113/66 with pulse of 49.  Blood pressure standing 105/78 with pulse of 50.  Patient was given IV fluids  EKG interpreted by the attending physician does reveal sinus bradycardia with occasional PVC ventricular rate 51 bpm    On reevaluation patient remains alert and oriented.  I did discuss findings of CT imaging and laboratory evaluation with the patient and his family at bedside.  I did recommend disposition of transfer to [facility] for neurosurgery evaluation and hospitalist admission the patient and family were in agreement with this.  Consult to hospitalist at the [facility] and I spoke with Dr. De Oliveira  Consult to neurosurgey and transfer center spoke with Scott Hirsch NP.  Per their report they are agreeable to consulting on the patient once admitted to [facility] as has already been accepted by hospital medicine.    Social Determinants Significantly Affecting Health : None    Is this patient to be included in the SEP-1 Core Measure due to severe sepsis or septic shock?   No   Exclusion criteria - the patient is NOT to be included for SEP-1 Core Measure due to:  Infection is not suspected    Discharge Time out:  CC Reviewed Yes   Test Results Yes     Vitals:    06/19/24 2000   BP: 132/88   Pulse: (!) 46   Resp: 16   Temp:    SpO2: 98%              FINAL IMPRESSION      1. BETY (acute kidney injury) (HCC)    2. Elevated troponin    3. Pulmonary nodule    4. Kidney lesion    5. Lung mass    6. Frontal mass of brain          DISPOSITION/PLAN   DISPOSITION Decision To Transfer 06/19/2024 01:43:50 PM      PATIENT REFERREDTO:  No follow-up provider specified.    DISCHARGE MEDICATIONS:  Discharge Medication List as of 6/19/2024  8:04 PM          DISCONTINUED MEDICATIONS:  Discharge Medication List as of 6/19/2024  8:04 PM

## 2024-06-19 NOTE — PLAN OF CARE
NEUROSURGERY PLAN OF CARE NOTE    EFRAIN GREEN  [id number]   1945   6/19/2024    Radiological Findings:  CT CHEST WO CONTRAST  Result Date: 6/19/2024  Suspected 4.5 cm mass in the superior segment of the right lower lobe invading the posterior right upper lobe which the abuts the right upper lobe bronchus and is concerning for endobronchial invasion as there is complete opacification of the proximal right upper lobe bronchus.  Heterogeneous material throughout the entirety of the right upper lobe which could represent additional masses and postobstructive pneumonia/volume loss. Multiple mildly enlarged mediastinal lymph nodes. 2 mm pulmonary nodule in the left upper lobe. New 2.9 cm indeterminate lesion in the posterosuperior right kidney which could represent cyst or mass lesion.  CT abdomen and pelvis with contrast recommended for further evaluation.     CT HEAD WO CONTRAST  Result Date: 6/19/2024  4 cm fluid containing lesion left frontal lobe.  This was not present in February 2023.  This could represent a cystic neoplasm.  No adjacent edema. No acute intracranial hemorrhage.  Consider brain MRI for further evaluation      Assessment:  Patient is a 78 y.o. y/o male w/progressive weakness fatigue and had a fall.     Recommendations:  Neurologic exams frequency: Q4H  For change in exam MUST contact neurosurgery team  MRI Brain w wo r/o brain mass  CT abdomen/Pelvis w contrast to complete malignancy w/u  Cerebral edema: Decadron 10 mg loading dose then 4 mg Q6H  GI prophylaxis: Protonix  Seizure prophylaxis: Keppra 1000 mg loading dose then 500 mg BID  Detailed consult when patient arrives.    DISPO: Transfer to [facility] with hospitalist as attending physician and Dr. Stuart as consulting neurosurgeon.     Electronically signed by: MOSES Booker CNP, APRN-CNP, 6/19/2024 2:45 PM  [phone]

## 2024-06-19 NOTE — PLAN OF CARE
[facility] Hospitalist Transfer accept note  Transfer center PS received    Case reviewed with ER physician    Reason for Transfer: Patient is a 78-year-old male with a past medical history of Alzheimer's dementia, coronary artery disease, hypertension who presented with progressive weakness fatigue and had a fall.  Workup in the ED showed patient has a :  -new lung mass with possible brain mets.  -BETY  -Orthostatic hypotension  -Bradycardia  -Tobacco abuse      Prelim diagnosis: Lung mass with brain mets.  Needs neurosurgery /pulmonology and possibly oncology     Patient has been accepted for transfer to [facility].   Once patient arrive please page ON CALL HOSPITALIST so patient can be seen.   If unable to reach physician on Perfect[employer] please call hospitalist phone (#[phone])     PCP:      Thanks  Felipa De Oliveira MD  Hospitalist

## 2024-06-19 NOTE — TELEPHONE ENCOUNTER
Son calling stating patient is very lethargic, droopy, not himself since yesterday. States his legs aren't working right.  His BP seems to be normal, but concerned.  They are taking him to the ER.

## 2024-06-19 NOTE — ED PROVIDER NOTES
ED Attending Attestation Note    I personally saw the patient and made/approved the management plan and take responsibility for patient management.     Briefly, 78 y.o. male presents with some general decline and not quite acting like his usual self.     Focused exam:   Gen: 78 y.o. male, NAD  GCS 15.  Clear speech, no facial droop.  Steady gaid.    Imaging:   CT CHEST WO CONTRAST   Final Result   Suspected 4.5 cm mass in the superior segment of the right lower lobe   invading the posterior right upper lobe which the abuts the right upper lobe   bronchus and is concerning for endobronchial invasion as there is complete   opacification of the proximal right upper lobe bronchus.  Heterogeneous   material throughout the entirety of the right upper lobe which could   represent additional masses and postobstructive pneumonia/volume loss.      Multiple mildly enlarged mediastinal lymph nodes.      2 mm pulmonary nodule in the left upper lobe.      New 2.9 cm indeterminate lesion in the posterosuperior right kidney which   could represent cyst or mass lesion.  CT abdomen and pelvis with contrast   recommended for further evaluation.         CT CERVICAL SPINE WO CONTRAST   Final Result   No acute abnormality of the cervical spine.      Right pleural effusion         CT HEAD WO CONTRAST   Final Result   4 cm fluid containing lesion left frontal lobe.  This was not present in   February 2023.  This could represent a cystic neoplasm.  No adjacent edema.   No acute intracranial hemorrhage.  Consider brain MRI for further evaluation            The Ekg interpreted by me shows  sinus bradycardia, rate=51 with PVCs    Axis is   Normal  QTc is  within an acceptable range  Intervals and Durations are unremarkable.      ST Segments: nonspecific changes.  Stable RBBB.  No significant change from prior EKG dated 8/8/23    MDM:   Pt with generalized sxs and workup concerning for lung mass/neoplasm with also CT finding with possible

## 2024-06-19 NOTE — ED NOTES
5:50pm- [facility] called and stated this patient will be going to [facility], Unit [facility], reporting number is [phone]. Dr De Oliveira is the accepting. Stated they will call back with ride information once it is set up.

## 2024-06-20 ENCOUNTER — APPOINTMENT (OUTPATIENT)
Dept: MRI IMAGING | Age: 79
End: 2024-06-20
Attending: INTERNAL MEDICINE
Payer: MEDICARE

## 2024-06-20 ENCOUNTER — APPOINTMENT (OUTPATIENT)
Dept: CT IMAGING | Age: 79
End: 2024-06-20
Attending: INTERNAL MEDICINE
Payer: MEDICARE

## 2024-06-20 LAB
ALBUMIN SERPL-MCNC: 3.1 G/DL (ref 3.4–5)
ALBUMIN SERPL-MCNC: 3.2 G/DL (ref 3.4–5)
ALBUMIN/GLOB SERPL: 0.9 {RATIO} (ref 1.1–2.2)
ALP SERPL-CCNC: 84 U/L (ref 40–129)
ALP SERPL-CCNC: 86 U/L (ref 40–129)
ALT SERPL-CCNC: 77 U/L (ref 10–40)
ALT SERPL-CCNC: 79 U/L (ref 10–40)
ANION GAP SERPL CALCULATED.3IONS-SCNC: 16 MMOL/L (ref 3–16)
AST SERPL-CCNC: 69 U/L (ref 15–37)
AST SERPL-CCNC: 70 U/L (ref 15–37)
BASOPHILS # BLD: 0 K/UL (ref 0–0.2)
BASOPHILS NFR BLD: 0.4 %
BILIRUB DIRECT SERPL-MCNC: <0.2 MG/DL (ref 0–0.3)
BILIRUB INDIRECT SERPL-MCNC: ABNORMAL MG/DL (ref 0–1)
BILIRUB SERPL-MCNC: 0.5 MG/DL (ref 0–1)
BILIRUB SERPL-MCNC: 0.5 MG/DL (ref 0–1)
BUN SERPL-MCNC: 33 MG/DL (ref 7–20)
CALCIUM SERPL-MCNC: 9.5 MG/DL (ref 8.3–10.6)
CHLORIDE SERPL-SCNC: 105 MMOL/L (ref 99–110)
CO2 SERPL-SCNC: 19 MMOL/L (ref 21–32)
CREAT SERPL-MCNC: 2.2 MG/DL (ref 0.8–1.3)
DEPRECATED RDW RBC AUTO: 19.2 % (ref 12.4–15.4)
EOSINOPHIL # BLD: 0 K/UL (ref 0–0.6)
EOSINOPHIL NFR BLD: 0.8 %
FERRITIN SERPL IA-MCNC: 600.8 NG/ML (ref 30–400)
FOLATE SERPL-MCNC: 11.77 NG/ML (ref 4.78–24.2)
FOLATE SERPL-MCNC: 12.6 NG/ML (ref 4.78–24.2)
GFR SERPLBLD CREATININE-BSD FMLA CKD-EPI: 30 ML/MIN/{1.73_M2}
GLUCOSE BLD-MCNC: 117 MG/DL (ref 70–99)
GLUCOSE BLD-MCNC: 119 MG/DL (ref 70–99)
GLUCOSE SERPL-MCNC: 94 MG/DL (ref 70–99)
HCT VFR BLD AUTO: 30.5 % (ref 40.5–52.5)
HCT VFR BLD AUTO: 33 % (ref 40.5–52.5)
HGB BLD-MCNC: 10.1 G/DL (ref 13.5–17.5)
IMMATURE RETIC FRACT: 0.35 (ref 0.21–0.37)
INR PPP: 1.25 (ref 0.85–1.15)
IRON SATN MFR SERPL: 15 % (ref 20–50)
IRON SERPL-MCNC: 24 UG/DL (ref 59–158)
LYMPHOCYTES # BLD: 0.3 K/UL (ref 1–5.1)
LYMPHOCYTES NFR BLD: 5.2 %
MAGNESIUM SERPL-MCNC: 2.2 MG/DL (ref 1.8–2.4)
MAGNESIUM SERPL-MCNC: 2.2 MG/DL (ref 1.8–2.4)
MCH RBC QN AUTO: 27 PG (ref 26–34)
MCHC RBC AUTO-ENTMCNC: 33 G/DL (ref 31–36)
MCV RBC AUTO: 81.7 FL (ref 80–100)
MONOCYTES # BLD: 0.1 K/UL (ref 0–1.3)
MONOCYTES NFR BLD: 1.8 %
NEUTROPHILS # BLD: 4.9 K/UL (ref 1.7–7.7)
NEUTROPHILS NFR BLD: 91.8 %
PERFORMED ON: ABNORMAL
PERFORMED ON: ABNORMAL
PLATELET # BLD AUTO: 215 K/UL (ref 135–450)
PMV BLD AUTO: 9 FL (ref 5–10.5)
POTASSIUM SERPL-SCNC: 3.4 MMOL/L (ref 3.5–5.1)
PROT SERPL-MCNC: 6.6 G/DL (ref 6.4–8.2)
PROT SERPL-MCNC: 6.7 G/DL (ref 6.4–8.2)
PROTHROMBIN TIME: 15.8 SEC (ref 11.9–14.9)
RBC # BLD AUTO: 3.73 M/UL (ref 4.2–5.9)
RETICS # AUTO: 0.02 M/UL
RETICS/RBC NFR AUTO: 0.41 % (ref 0.5–2.18)
SODIUM SERPL-SCNC: 140 MMOL/L (ref 136–145)
TIBC SERPL-MCNC: 158 UG/DL (ref 260–445)
TROPONIN, HIGH SENSITIVITY: 34 NG/L (ref 0–22)
TROPONIN, HIGH SENSITIVITY: 36 NG/L (ref 0–22)
TROPONIN, HIGH SENSITIVITY: 37 NG/L (ref 0–22)
TROPONIN, HIGH SENSITIVITY: 38 NG/L (ref 0–22)
TROPONIN, HIGH SENSITIVITY: 42 NG/L (ref 0–22)
TSH SERPL DL<=0.005 MIU/L-ACNC: 1.37 UIU/ML (ref 0.27–4.2)
VIT B12 SERPL-MCNC: 370 PG/ML (ref 211–911)
VIT B12 SERPL-MCNC: 452 PG/ML (ref 211–911)
WBC # BLD AUTO: 5.3 K/UL (ref 4–11)

## 2024-06-20 PROCEDURE — 84484 ASSAY OF TROPONIN QUANT: CPT

## 2024-06-20 PROCEDURE — 83540 ASSAY OF IRON: CPT

## 2024-06-20 PROCEDURE — 80053 COMPREHEN METABOLIC PANEL: CPT

## 2024-06-20 PROCEDURE — 85025 COMPLETE CBC W/AUTO DIFF WBC: CPT

## 2024-06-20 PROCEDURE — 2580000003 HC RX 258: Performed by: FAMILY MEDICINE

## 2024-06-20 PROCEDURE — 6360000004 HC RX CONTRAST MEDICATION: Performed by: HOSPITALIST

## 2024-06-20 PROCEDURE — 36415 COLL VENOUS BLD VENIPUNCTURE: CPT

## 2024-06-20 PROCEDURE — 2060000000 HC ICU INTERMEDIATE R&B

## 2024-06-20 PROCEDURE — 6370000000 HC RX 637 (ALT 250 FOR IP)

## 2024-06-20 PROCEDURE — 97530 THERAPEUTIC ACTIVITIES: CPT

## 2024-06-20 PROCEDURE — C9113 INJ PANTOPRAZOLE SODIUM, VIA: HCPCS

## 2024-06-20 PROCEDURE — 82728 ASSAY OF FERRITIN: CPT

## 2024-06-20 PROCEDURE — 2580000003 HC RX 258

## 2024-06-20 PROCEDURE — 99221 1ST HOSP IP/OBS SF/LOW 40: CPT | Performed by: NURSE PRACTITIONER

## 2024-06-20 PROCEDURE — 99222 1ST HOSP IP/OBS MODERATE 55: CPT | Performed by: INTERNAL MEDICINE

## 2024-06-20 PROCEDURE — 82607 VITAMIN B-12: CPT

## 2024-06-20 PROCEDURE — 97116 GAIT TRAINING THERAPY: CPT

## 2024-06-20 PROCEDURE — 6360000002 HC RX W HCPCS

## 2024-06-20 PROCEDURE — 83550 IRON BINDING TEST: CPT

## 2024-06-20 PROCEDURE — 97166 OT EVAL MOD COMPLEX 45 MIN: CPT

## 2024-06-20 PROCEDURE — 82746 ASSAY OF FOLIC ACID SERUM: CPT

## 2024-06-20 PROCEDURE — 87040 BLOOD CULTURE FOR BACTERIA: CPT

## 2024-06-20 PROCEDURE — 84443 ASSAY THYROID STIM HORMONE: CPT

## 2024-06-20 PROCEDURE — 97162 PT EVAL MOD COMPLEX 30 MIN: CPT

## 2024-06-20 PROCEDURE — 85610 PROTHROMBIN TIME: CPT

## 2024-06-20 PROCEDURE — 74176 CT ABD & PELVIS W/O CONTRAST: CPT

## 2024-06-20 PROCEDURE — 83735 ASSAY OF MAGNESIUM: CPT

## 2024-06-20 PROCEDURE — 70552 MRI BRAIN STEM W/DYE: CPT

## 2024-06-20 PROCEDURE — 94150 VITAL CAPACITY TEST: CPT

## 2024-06-20 PROCEDURE — A9576 INJ PROHANCE MULTIPACK: HCPCS | Performed by: HOSPITALIST

## 2024-06-20 PROCEDURE — 85045 AUTOMATED RETICULOCYTE COUNT: CPT

## 2024-06-20 RX ORDER — METOPROLOL SUCCINATE 50 MG/1
50 TABLET, EXTENDED RELEASE ORAL DAILY
Status: DISCONTINUED | OUTPATIENT
Start: 2024-06-20 | End: 2024-06-22 | Stop reason: HOSPADM

## 2024-06-20 RX ORDER — OXYCODONE HCL 20 MG/ML
5 CONCENTRATE, ORAL ORAL
Status: DISCONTINUED | OUTPATIENT
Start: 2024-06-20 | End: 2024-06-22 | Stop reason: HOSPADM

## 2024-06-20 RX ORDER — LORAZEPAM 2 MG/ML
1 CONCENTRATE ORAL
Status: DISCONTINUED | OUTPATIENT
Start: 2024-06-20 | End: 2024-06-22 | Stop reason: HOSPADM

## 2024-06-20 RX ORDER — POTASSIUM CHLORIDE 20 MEQ/1
40 TABLET, EXTENDED RELEASE ORAL ONCE
Status: COMPLETED | OUTPATIENT
Start: 2024-06-20 | End: 2024-06-20

## 2024-06-20 RX ORDER — ASPIRIN 81 MG/1
81 TABLET ORAL DAILY
Status: DISCONTINUED | OUTPATIENT
Start: 2024-06-20 | End: 2024-06-22

## 2024-06-20 RX ORDER — FERROUS SULFATE 325(65) MG
325 TABLET ORAL
Status: DISCONTINUED | OUTPATIENT
Start: 2024-06-20 | End: 2024-06-22

## 2024-06-20 RX ORDER — SODIUM CHLORIDE 9 MG/ML
INJECTION, SOLUTION INTRAVENOUS CONTINUOUS
Status: DISCONTINUED | OUTPATIENT
Start: 2024-06-20 | End: 2024-06-21

## 2024-06-20 RX ORDER — POTASSIUM CHLORIDE 7.45 MG/ML
10 INJECTION INTRAVENOUS
Status: COMPLETED | OUTPATIENT
Start: 2024-06-20 | End: 2024-06-20

## 2024-06-20 RX ORDER — POTASSIUM CHLORIDE 7.45 MG/ML
10 INJECTION INTRAVENOUS
Status: DISCONTINUED | OUTPATIENT
Start: 2024-06-20 | End: 2024-06-20

## 2024-06-20 RX ORDER — CLOPIDOGREL BISULFATE 75 MG/1
75 TABLET ORAL DAILY
Status: DISCONTINUED | OUTPATIENT
Start: 2024-06-20 | End: 2024-06-22

## 2024-06-20 RX ORDER — NICOTINE 21 MG/24HR
1 PATCH, TRANSDERMAL 24 HOURS TRANSDERMAL DAILY
Status: DISCONTINUED | OUTPATIENT
Start: 2024-06-20 | End: 2024-06-22 | Stop reason: HOSPADM

## 2024-06-20 RX ORDER — DONEPEZIL HYDROCHLORIDE 10 MG/1
10 TABLET, FILM COATED ORAL DAILY
Status: DISCONTINUED | OUTPATIENT
Start: 2024-06-20 | End: 2024-06-22

## 2024-06-20 RX ADMIN — POTASSIUM CHLORIDE 10 MEQ: 10 INJECTION, SOLUTION INTRAVENOUS at 11:30

## 2024-06-20 RX ADMIN — FERROUS SULFATE TAB 325 MG (65 MG ELEMENTAL FE) 325 MG: 325 (65 FE) TAB at 10:03

## 2024-06-20 RX ADMIN — LEVETIRACETAM 500 MG: 500 INJECTION INTRAVENOUS at 09:39

## 2024-06-20 RX ADMIN — PANTOPRAZOLE SODIUM 40 MG: 40 INJECTION, POWDER, FOR SOLUTION INTRAVENOUS at 09:39

## 2024-06-20 RX ADMIN — POTASSIUM CHLORIDE 40 MEQ: 1500 TABLET, EXTENDED RELEASE ORAL at 07:27

## 2024-06-20 RX ADMIN — LEVETIRACETAM 500 MG: 500 INJECTION INTRAVENOUS at 23:00

## 2024-06-20 RX ADMIN — DONEPEZIL HYDROCHLORIDE 10 MG: 10 TABLET ORAL at 10:03

## 2024-06-20 RX ADMIN — SODIUM CHLORIDE: 9 INJECTION, SOLUTION INTRAVENOUS at 11:29

## 2024-06-20 RX ADMIN — METOPROLOL SUCCINATE 50 MG: 50 TABLET, EXTENDED RELEASE ORAL at 09:55

## 2024-06-20 RX ADMIN — DEXAMETHASONE SODIUM PHOSPHATE 4 MG: 4 INJECTION INTRA-ARTICULAR; INTRALESIONAL; INTRAMUSCULAR; INTRAVENOUS; SOFT TISSUE at 17:46

## 2024-06-20 RX ADMIN — DEXAMETHASONE SODIUM PHOSPHATE 4 MG: 4 INJECTION INTRA-ARTICULAR; INTRALESIONAL; INTRAMUSCULAR; INTRAVENOUS; SOFT TISSUE at 23:00

## 2024-06-20 RX ADMIN — POTASSIUM CHLORIDE 10 MEQ: 10 INJECTION, SOLUTION INTRAVENOUS at 09:54

## 2024-06-20 RX ADMIN — DEXAMETHASONE SODIUM PHOSPHATE 4 MG: 4 INJECTION INTRA-ARTICULAR; INTRALESIONAL; INTRAMUSCULAR; INTRAVENOUS; SOFT TISSUE at 04:33

## 2024-06-20 RX ADMIN — GADOTERIDOL 13 ML: 279.3 INJECTION, SOLUTION INTRAVENOUS at 09:07

## 2024-06-20 RX ADMIN — DEXAMETHASONE SODIUM PHOSPHATE 4 MG: 4 INJECTION INTRA-ARTICULAR; INTRALESIONAL; INTRAMUSCULAR; INTRAVENOUS; SOFT TISSUE at 10:03

## 2024-06-20 RX ADMIN — AMLODIPINE BESYLATE 10 MG: 10 TABLET ORAL at 09:55

## 2024-06-20 RX ADMIN — SODIUM CHLORIDE, PRESERVATIVE FREE 10 ML: 5 INJECTION INTRAVENOUS at 20:48

## 2024-06-20 RX ADMIN — ROSUVASTATIN CALCIUM 40 MG: 20 TABLET, COATED ORAL at 20:43

## 2024-06-20 RX ADMIN — SODIUM CHLORIDE: 9 INJECTION, SOLUTION INTRAVENOUS at 18:03

## 2024-06-20 RX ADMIN — SODIUM CHLORIDE, PRESERVATIVE FREE 10 ML: 5 INJECTION INTRAVENOUS at 11:23

## 2024-06-20 NOTE — PROGRESS NOTES
Pt arrived on unit. Admission paperwork completed. VSS and assessment completed. Oriented pt to room and unit protocols. Educated pt on importance of calling for help when going to the bathroom. Bed alarm activated. Call light within reach. Denies further needs at this time.

## 2024-06-20 NOTE — PROGRESS NOTES
Occupational Therapy  Facility/Department: [address]  Occupational Therapy Initial Assessment    Name: Jacob Rosales  : 1945  MRN: [id number]  Date of Service: 2024    Discharge Recommendations:  24 hour supervision or assist  OT Equipment Recommendations  Equipment Needed: No       Patient Diagnosis(es): There were no encounter diagnoses.  Past Medical History:  has a past medical history of CAD (coronary artery disease), Carotid bruit, Dementia (HCC), ED (erectile dysfunction), Hyperlipidemia, Hypertension, and Tobacco abuse.  Past Surgical History:  has a past surgical history that includes Coronary angioplasty with stent and Skin lesion excision (Right, 2023).    Treatment Diagnosis: imp mob, tf, ADL      Assessment   Performance deficits / Impairments: Decreased functional mobility ;Decreased ADL status;Decreased endurance  Assessment: From home with son, admit with weakness, hx dementia, CT chest showed mass in RLL, pulmonary nodule in EBONY, multiple mildly enlarged mediastinal lymph nodes. MRI brain showed left frontal and left cerebellar intracranial cystic lesions, concerning for metastases. Pt pleasant and cooperative this session though confused throughout, oriented to self. Pt completing mobility to and from bathroom, to doorway, completing toileting and grooming. Would benefit from cont therapies while in acute care and 24hrA at [place].  Treatment Diagnosis: imp mob, tf, ADL  REQUIRES OT FOLLOW-UP: Yes  Activity Tolerance  Activity Tolerance: Treatment limited secondary to decreased cognition;Patient Tolerated treatment well        Plan   Occupational Therapy Plan  Times Per Week: 2-5     Restrictions  Position Activity Restriction  Other position/activity restrictions: up with assist, elevate HOB 30*    Subjective   General  Chart Reviewed: Yes  Additional Pertinent Hx: 79 yo M with PMH dementia, HTN, HLD, CAD, tobacco use disorder, who presented to [facility] with cc  clothing?: A Little  How much help is needed for taking care of personal grooming?: A Little  How much help for eating meals?: None  AM-PAC Inpatient Daily Activity Raw Score: 19  AM-PAC Inpatient ADL T-Scale Score : 40.22  ADL Inpatient CMS 0-100% Score: 42.8  ADL Inpatient CMS G-Code Modifier : CK    Tinneti Score       Goals  Short Term Goals  Time Frame for Short Term Goals: dc  Short Term Goal 1: supine to sit with SPVN  Short Term Goal 2: UB/LB dressing with SPVN  Short Term Goal 3: toileting with SPVN       Therapy Time   Individual Concurrent Group Co-treatment   Time In 1035         Time Out 1058         Minutes 23             Timed Code Treatment Minutes:   23    Total Treatment Minutes:  23    Sharon Woo, MOT, OTR/L, CNS

## 2024-06-20 NOTE — H&P
Internal Medicine  PGY 1  History & Physical      CC weakness, fatigue, fall    History Obtained From:  patient    HISTORY OF PRESENT ILLNESS:  77 yo M with PMH dementia, HTN, HLD, CAD, tobacco use disorder, who presented to [facility] with cc weakness, fatigue, fall.   History is obtained by reviewing EMR. Patient is not able to provide much history.  According to the charts, patient presented with progressive weakness fatigue and a fall.  ED note indicates that patient's son stated that patient had been experiencing lethargy and decreased PO intake.  Patient denies any acute symptoms.    Hospital course:  Initial Vitals: T 97.9, RR 30, HR 47, /65, SpO2 100% on RA   Labs showed Hg 11.2, Cr 2.2, BUN 33, Glucose 113, ProBNP 632, ALT 92, AST 98,   CT chest without contrast showed 4.5 cm mass in RLL, 2 mm pulmonary nodule in EBONY, multiple mildly enlarged mediastinal lymph nodes  MRI brain without contrast showed left frontal and left cerebellar intracranial cystic lesions, concerning for metastases. The left frontal lesion exerts mild local mass effect and compression of the left lateral ventricle with 3  mm left-to-right midline shift   EKG showed sinus bradycardia with PVCs    He was admitted for further evaluation and management.    Past Medical History:        Diagnosis Date    CAD (coronary artery disease)     Carotid bruit     Dementia (HCC)     ED (erectile dysfunction)     Hyperlipidemia     Hypertension     Tobacco abuse        Past Surgical History:        Procedure Laterality Date    CORONARY ANGIOPLASTY WITH STENT PLACEMENT      SKIN LESION EXCISION Right 11/6/2023    EXCISION SKIN LESION RIGHT POSTERIOR NECK TIMES TWO performed by [place] Armando [place] [facility] [place]       Medications Priorto Admission:    Medications Prior to Admission: ferrous sulfate (IRON 325) 325 (65 Fe) MG tablet, Take 1 tablet by mouth daily (with breakfast)  donepezil (ARICEPT) 10 MG tablet, Take 1 tablet by mouth daily  amLODIPine  use disorder  Active smoker, not interested in cutting down or quitting  Reports 2-3 packs/day since 10 years of age  - Patient counseling  - Nicotine patch as needed    Dementia  -On donepezil at home    Will discuss with attending physician Dr. Gant    Code Status:Full code  FEN: NPO   PPX: Protonix  DISPO: IP Behnam Enayataval, MD  6/20/2024,  1:41 AM

## 2024-06-20 NOTE — CONSULTS
[facility]  Palliative Medicine Consultation Note      Date Of Admission:6/19/2024  Date of consult: 06/20/24  Seen by PC in the past:  No    Recommendations:        Saw pt at the bedside. He was alert, oriented to self, knew he was in a hospital (thought it was [place]), did not know the month or why he was here. Very pleasant, denied any complaints. No visitors present. D/w [facility] with Oncology.    Called pt's son Yasmany, his wife answered and reported he was in the store. Asked her to have him call back when he's available.    Pt does have a POA on file naming Yasmany as the primary agent and daughter Amy as the first alternate agent.     Received a return phone call from son Yasmany. He has a very good understanding of pt's current condition. He reported pt is at baseline mental status today. Pt has very poor short term memory. He reports pt has an extensive smoking history and was aware of the risk of potential lung cancer. Yasmany does want to know what pt has and is OK with a biopsy. Whether or not they would pursue any treatment depends on the risk/benefits and diagnosis. Yasmany reported that he has discussed with his two sisters already and their primary goal is for pt to maintain quality of life and comfort. They understand that the alternative to treatment would be hospice care.     Addendum: after speaking with Dr. Mackay pt's son Yasmany decided a biopsy would not be in pt's best interest. Plan to pursue hospice care at this point. Spoke with Yasmany and his wife at the bedside, they requested a referral to [facility] with plans to bring him home with hospice. Confirmed that pt's two daughters are also in agreement with this plan. Discussed changing code status to DNRCC and ordering comfort meds in the event that pt develops distressing symptoms. Yasmany in agreement.     1. Goals of Care/Advanced Care planning/Code status: DNRCC, changed today upon discussion with pt's  disease; Can't do hobbies/housework; intake normal or reduced; occasional assist; LOC full/confusion  [] 50% Mainly sit/lie; Extensive disease; Can't do any work; Considerable assist; intake normal  Or reduced; LOC full/confusion  [] 40% Mainly in bed; Extensive disease; Mainly assist; intake normal or reduced; occasional assist; LOC full/confusion  [] 30% Bed Bound; Extensive disease; Total care; intake reduced; LOC full/confusion  [] 20% Bed Bound; Extensive disease; Total care; intake minimal; Drowsy/coma  [] 10% Bed Bound; Extensive disease; Total care; Mouth care only; Drowsy/coma  [] 0% Death    PPS: greater than 60%    Vitals:    /88   Pulse 51   Temp 98 °F (36.7 °C) (Oral)   Resp 24   SpO2 95%     Labs:    BMP:   Recent Labs     06/19/24  1228 06/20/24  0509    140   K 5.0 3.4*    105   CO2 20* 19*   BUN 33* 33*   CREATININE 2.2* 2.2*   GLUCOSE 113* 94     CBC:   Recent Labs     06/19/24  1150 06/20/24  0509   WBC 6.5 5.3   HGB 11.2* 10.1*   HCT 34.2* 30.5*    215       LFT's:   Recent Labs     06/19/24  1228 06/20/24  0509   AST 98* 69*  70*   ALT 92* 77*  79*   BILITOT 0.4 0.5  0.5   ALKPHOS 102 84  86     Troponin: No results for input(s): \"TROPONINI\" in the last 72 hours.  BNP: No results for input(s): \"BNP\" in the last 72 hours.  ABGs: No results for input(s): \"PHART\", \"OMO4LJT\", \"PO2ART\" in the last 72 hours.  INR:   Recent Labs     06/20/24  0509   INR 1.25*       U/A:  Recent Labs     06/19/24  1224   COLORU Yellow   PHUR 6.0   WBCUA 0-2   RBCUA 0-2   MUCUS 2+*   BACTERIA 2+*   CLARITYU Clear   LEUKOCYTESUR Negative   UROBILINOGEN 0.2   BILIRUBINUR Negative   BLOODU SMALL*   GLUCOSEU Negative       MRI BRAIN WO CONTRAST   Final Result      Left frontal and left cerebellar intracranial cystic lesions, concerning for metastases. These are slightly limited in assessment without contrast. The left frontal lesion exerts mild local mass effect and compression of the left

## 2024-06-20 NOTE — CONSULTS
Hospital as a transfer from [facility] with complaints of progressive weakness, fatigue, and history of falls. CT chest reveals suspected 4.5 cm mass in the superior segment of the right lower lobe invading the posterior right upper lobe and is concerning for endobronchial invasion, multiple mildly enlarged mediastinal lymph nodes, 2 mm pulmonary nodule in the left upper lobe, and a new 2.9 cm indeterminate lesion in the posterosuperior right kidney which  could represent cyst or mass lesion. CT head reveals 4cm fluid containing lesion left frontal lobe. Neurosurgery was consulted for further evaluation.     MRI BRAIN W CONTRAST  Result Date: 6/20/202  1. Cystic lesions in the left frontal and left cerebellar regions as described, demonstrating peripheral enhancement with some areas of nodular enhancement as well. The nodular enhancement is more pronounced involving the left cerebellar hemisphere lesion, but is also prominent in the inferolateral portion of the left frontal lesion. Electronically signed by Edward Kaplan MD    MRI BRAIN WO CONTRAST  Result Date: 6/20/2024  Exam:MRI BRAIN WO CONTRAST   Left frontal and left cerebellar intracranial cystic lesions, concerning for metastases. These are slightly limited in assessment without contrast. The left frontal lesion exerts mild local mass effect and compression of the left lateral ventricle with 3  mm left-to-right midline shift. Mild atrophy and moderate chronic small vessel ischemic change. Electronically signed by Foreign Jurado MD    Plan:  No emergent neurosurgical intervention at this time. Await lung biopsy results to determine primary.   -Patient is DNR-CCA. Per patient's son, they will await results of biopsy prior to making a final decision regarding plan of care, but expressed hesitancy regarding surgery.  -If aggressive neurosurgical intervention is desired, patient would need resection of large frontal mass, timing to be  determined.  Neurologic exams frequency: Q4H  For change in exam MUST contact neurosurgery team along with critical care or primary team  Brain Mass:   - MRI Brain w/wo contrast reveals left frontal and left cerebellar intracranial cystic lesions, concerning for metastases. The left frontal lesion exerts mild local mass effect and compression of the left lateral ventricle with 3 mm left-to-right midline shift.   - CT abdomen/pelvis without contrast shows no evidence of metastatic disease of the abdomen or pelvis.   Cerebral edema: Decadron IV 4 mg Q6H  Seizure prophylaxis: Keppra IV 500mg BID  GI Prophylaxis: Protonix  DVT Prophylaxis: SCD's. Lovenox and Plavix held.   Appreciate hospitalist, oncology, pulmonology, and palliative care assistance in management.  Thank you for consult. Will follow inpatient. Please call with any questions or decline in neurological status.     DISPO: Remain inpatient from neurosurgery standpoint. Dispo timing to be determined by primary team once patient is medically stable for discharge.    Patient was discussed with Dr. Stuart who agrees with above assessment and plan.     Electronically signed by: Zulma Lozano PA-C, 6/20/2024 7:37 AM  [phone]

## 2024-06-20 NOTE — CONSULTS
Oncology Hematology Care    Consult Note      Requesting Physician:  Jorge Gant     CHIEF COMPLAINT:  weakness, fall       HISTORY OF PRESENT ILLNESS:    Mr. Rosales  is a 78 y.o. male we are seeing in consultation for probable metastatic lung cancer. According to the charts, patient presented to [facility] with progressive weakness fatigue and a fall.  ED note indicates that patient's son stated that patient had been experiencing lethargy and decreased PO intake.     CT Chest with Suspected 4.5 cm mass in the superior segment of the right lower lobe invading the posterior right upper lobe which the abuts the right upper lobe  bronchus and is concerning for endobronchial invasion as there is complete  opacification of the proximal right upper lobe bronchus. Multiple mildly enlarged mediastinal lymph nodes. 2 mm pulmonary nodule in the left upper lobe.     Brain MRI with Left frontal and left cerebellar intracranial cystic lesions, concerning for metastases. These are slightly limited in assessment without contrast. The left frontal lesion exerts mild local mass effect and compression of the left lateral ventricle with 3 mm left-to-right midline shift.    He was transferred to [facility] for further management.     Today he is sleeping, no family at bedside. Palliative Care did speak with his son and he is very realistic about goals of care. Patient wakes up to stimuli but quickly falls back asleep      Past Medical History:  Past Medical History:   Diagnosis Date    CAD (coronary artery disease)     Carotid bruit     Dementia (HCC)     ED (erectile dysfunction)     Hyperlipidemia     Hypertension     Tobacco abuse        Past Surgical History:  Past Surgical History:   Procedure Laterality Date    CORONARY ANGIOPLASTY WITH STENT PLACEMENT      SKIN LESION EXCISION Right 11/6/2023    EXCISION SKIN

## 2024-06-20 NOTE — CARE COORDINATION
Per notes, pt is alert to self. Called pt's son Yasmany who states he will be here in within the hour. Spoke with Zulma OSORIO who states pt's 3 kids are legal next of kin if no healthcare POA as the POA on file is not for healthcare; see forms. Zulma OSORIO states she spoke with MD who states family was interested in hospice and will return later to bedside.     ROBYN Green   for [facility] and [facility] [place] ([facility])  [employer] Mobile: [phone]    Addendum at 2:14pm: Returned to pt's room and pt's family is not at bedside    Addendum at 2:47pm: Case Management Assessment  Initial Evaluation    Date/Time of Evaluation: 6/20/2024 2:47 PM  Assessment Completed by: ROBYN Green   for [facility] and [facility] [place] ([facility])  [employer]: [phone]    If patient is discharged prior to next notation, then this note serves as note for discharge by case management.    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 Yes    Chart Reviewed: Yes      History Provided by: Child/Family  Patient Orientation: Person    Patient Cognition: Other (see comment) (pt sleeping)    Hospitalization in the last 30 days (Readmission):  No    If yes, Readmission Assessment in CM Navigator will be completed.    Advance Directives:      Code Status: DNR-CCA   Patient's Primary Decision Maker is: Named in Scanned ACP Document    Primary Decision Maker: Yasmany Rosales - Child - [phone]    Secondary Decision Maker: JERAD RICHARDSON - Child - [phone]    Discharge Planning:    Patient lives with: Children Type of Home: House  Primary Care Giver:

## 2024-06-20 NOTE — PROGRESS NOTES
restrictions: up with assist, elevate HOB 30*     Subjective   General  Chart Reviewed: Yes  Additional Pertinent Hx: 77 yo presented to [place] ED with general decline & not acting like himself.  Head CT:4 cm fluid containing lesion left frontal lobe.  This was not present in  February 2023.  This could represent a cystic neoplasm.  CT chest:Suspected 4.5 cm mass in the superior segment of the right lower lobe  invading the posterior right upper lobe.2 mm pulmonary nodule in the left upper lobe.New 2.9 cm indeterminate lesion in the posterosuperior right kidney which  could represent cyst or mass lesion.PMH dementia, HTN, HLD, CAD, tobacco use disorder.  MRI brain:Left frontal and left cerebellar intracranial cystic lesions, concerning for metastases. These are slightly limited in assessment without contrast. The left frontal lesion exerts mild local mass effect and compression of the left lateral ventricle with 3   mm left-to-right midline shift.  Family / Caregiver Present: No  Referring Practitioner: Carleen Hugo MD  Diagnosis: metastatic cancer to brain  Follows Commands: Within Functional Limits  Subjective  Subjective: Found in bed, agreeable to PT.  Denies pain.  \"I don't know why I'm here.\"  \"I'm in [place].\" Thought it was December.  Very pleasant & cooperative.         Social/Functional History  Social/Functional History  Lives With: Son (His son/wife and two daughters, son and his wife both work, high scoolers granddaughters)  Home Layout: Two level, Bed/Bath upstairs, Performs ADL's on one level, Able to Live on Main level with bedroom/bathroom (\"I sleep on the living room sofa with a full bath down there\" walk in shower in the basement)  Home Access: Stairs to enter with rails  Entrance Stairs - Number of Steps: 6  Bathroom Shower/Tub: Walk-in shower (walk in shower in the basement)  Bathroom Toilet: Standard  Bathroom Equipment: None  Home Equipment: None  Has the patient had two or more  falls in the past year or any fall with injury in the past year?: No  ADL Assistance: Independent  Homemaking Assistance: Needs assistance  Ambulation Assistance: Independent  Transfer Assistance: Independent  Active : Yes  Occupation: Retired  Additional Comments: questionable historian, dementia, oriented to self       Cognition - oriented to self only        Objective             AROM RLE (degrees)  RLE General AROM: unable to fully extend R knee  AROM LLE (degrees)  LLE AROM : WFL  Strength RLE  Strength RLE: WFL  Strength LLE  Strength LLE: WFL           Bed mobility  Supine to Sit: Stand by assistance  Sit to Supine: Stand by assistance  Transfers  Sit to Stand: Stand by assistance (appeared to lean post against bed upon standing)  Stand to Sit: Stand by assistance  Ambulation  Device: No Device  Assistance: Stand by assistance;Contact guard assistance  Quality of Gait: mostly steady gait except for 1 LOB to left when stepping around an object  Distance: 15' x 3  Comments: Limited session due to stat lab taken & then transport arriving to take pt to test.     Balance  Sitting - Static: Good (indep sitting EOB)  Standing - Static:  (CGA at sink with no LOB)       Treatment included gait/transfer training, pt education.                                                      AM-PAC - Mobility    AM-PAC Basic Mobility - Inpatient   How much help is needed turning from your back to your side while in a flat bed without using bedrails?: A Little  How much help is needed moving from lying on your back to sitting on the side of a flat bed without using bedrails?: A Little  How much help is needed moving to and from a bed to a chair?: A Little  How much help is needed standing up from a chair using your arms?: A Little  How much help is needed walking in hospital room?: A Little  How much help is needed climbing 3-5 steps with a railing?: A Little  -[facility] Inpatient Mobility Raw Score : 18  AM-PAC Inpatient T-Scale

## 2024-06-20 NOTE — PLAN OF CARE
Problem: Safety - Adult  Goal: Free from fall injury  Outcome: Progressing  Note:  High Fall Risk per ZAPATA/ABCDS: Explained fall risk precautions to pt and family and rationale behind their use to keep the patient safe. Pt bed is in low position, side rails up, call light and belongings are in reach. Fall wristband applied and present on pts wrist.  Bed alarm on.  Pt encouraged to call for assistance. Will continue with hourly rounds for PO intake, pain needs, toileting and repositioning as needed.          Problem: Skin/Tissue Integrity - Adult  Goal: Skin integrity remains intact  Outcome: Progressing  Note: No skin break down noted this shift.     Problem: Metabolic/Fluid and Electrolytes - Adult  Goal: Electrolytes maintained within normal limits  Outcome: Progressing  Note: Pt needed K replacement this AM     Problem: Hematologic - Adult  Goal: Maintains hematologic stability  Outcome: Progressing  Note: Patient's hemoglobin this AM:   Recent Labs     06/20/24  0509   HGB 10.1*     Patient's platelet count this AM:   Recent Labs     06/20/24  0509       Thrombocytopenia not present at this time.  Patient showing no signs or symptoms of active bleeding.  Transfusion not indicated at this time.  Patient verbalizes understanding of all instructions. Will continue to assess and implement POC. Call light within reach and hourly rounding in place.

## 2024-06-20 NOTE — ACP (ADVANCE CARE PLANNING)
Advance Care Planning      Palliative Medicine Provider (MD/NP)  Advance Care Planning (ACP) Conversation      Date of Conversation: 06/20/24  The patient and/or authorized decision maker consented to a voluntary Advance Care Planning conversation.   Individuals present for the conversation:   Legal healthcare agent named below    Legal Healthcare Agent(s):    Primary Decision Maker: Yasmany Rosales - Child - [phone]    Secondary Decision Maker: JERAD RICHARDSON - Child - [phone]    ACP documents available in EMR prior to discussion:  -Power of  for Healthcare    Primary Palliative Diagnosis(es):  New metastatic malignancy  Dementia    Conversation Summary:  Received a return phone call from son Yasmany. He has a very good understanding of pt's current condition. He reported pt is at baseline mental status today. Pt has very poor short term memory. He reports pt has an extensive smoking history and was aware of the risk of potential lung cancer. Yasmany does want to know what pt has and is OK with a biopsy. Whether or not they would pursue any treatment depends on the risk/benefits and diagnosis. Yasmany reported that he has discussed with his two sisters already and their primary goal is for pt to maintain quality of life and comfort. They understand that the alternative to treatment would be hospice care.       Resuscitation Status:    Code Status: DNR-CCA    Outcomes / Completed Documentation:  An explanation of advance directives and their importance was provided and the following forms completed:    -No new documents completed.    If new document completed, original was provided to patient and/or family member.    Copy was placed for scanning into the [facility] EMR.      I spent 16 minutes providing separately identifiable ACP services with the patient and/or surrogate decision maker in a voluntary, in-person conversation discussing the patient's wishes and goals as detailed in the above note.       Zulma

## 2024-06-20 NOTE — PROGRESS NOTES
Spoke to healthcare power of , son Yasmany Rosales. Healthcare power of  documentation is in the chart.     Healthcare power of  believes the patient's wishes most align with foregoing aggressive resuscitation measures including shocks, medications, and compressions in the event of a cardiopulmonary arrest. The healthcare power of  believes that the patient would be comfortable with intubation for temporary purposes only.    This decision was confirmed. Patient is now DNR-CCA.

## 2024-06-20 NOTE — PROGRESS NOTES
4 Eyes Skin Assessment     NAME:  Jacob Rosales  YOB: 1945  MEDICAL RECORD NUMBER:  [id number]    The patient is being assessed for  Admission    I agree that at least one RN has performed a thorough Head to Toe Skin Assessment on the patient. ALL assessment sites listed below have been assessed.      Areas assessed by both nurses:    Head, Face, Ears, Shoulders, Back, Chest, Arms, Elbows, Hands, Sacrum. Buttock, Coccyx, Ischium, Legs. Feet and Heels, and Under Medical Devices         Does the Patient have a Wound? No noted wound(s)       Darryn Prevention initiated by RN: No  Wound Care Orders initiated by RN: No    Pressure Injury (Stage 3,4, Unstageable, DTI, NWPT, and Complex wounds) if present, place Wound referral order by RN under : No    New Ostomies, if present place, Ostomy referral order under : No     Nurse 1 eSignature: Electronically signed by Christiano Ramos RN on 6/20/24 at 2:51 AM EDT    **SHARE this note so that the co-signing nurse can place an eSignature**    Nurse 2 eSignature: Electronically signed by Nora Lindsey RN on 6/20/24 at 7:30 PM EDT

## 2024-06-20 NOTE — PLAN OF CARE
Problem: Safety - Adult  Goal: Free from fall injury  6/20/2024 1903 by Bia Sr, RN  Outcome: Progressing  Flowsheets (Taken 6/20/2024 0758)  Free From Fall Injury: Instruct family/caregiver on patient safety  6/20/2024 0647 by Christiano Ramos RN  Outcome: Progressing  Note:  High Fall Risk per ZAPATA/ABCDS: Explained fall risk precautions to pt and family and rationale behind their use to keep the patient safe. Pt bed is in low position, side rails up, call light and belongings are in reach. Fall wristband applied and present on pts wrist.  Bed alarm on.  Pt encouraged to call for assistance. Will continue with hourly rounds for PO intake, pain needs, toileting and repositioning as needed.          Problem: Skin/Tissue Integrity - Adult  Goal: Skin integrity remains intact  6/20/2024 1903 by Bia Sr, RN  Outcome: Progressing  6/20/2024 0647 by Christiano Ramos RN  Outcome: Progressing  Note: No skin break down noted this shift.

## 2024-06-20 NOTE — PROGRESS NOTES
Internal Medicine Progress Note    Date: 6/20/2024   Patient: Jacob Rosales   [facility] Day: 1      CC: Weakness     Interval Hx     No acute events overnight. HR high 40s-50s. BP 130s-140s/60s-80s. Urinating independently.     K 3.4, replaced. Cr unchanged.     Spoke to son and healthcare power of  6/20 AM. Mr. James usually leads a pretty active lifestyle. He will go to the barn every morning with his son to feed the animals. He will help his son with gardening every day (son supervises him and takes caution to keep him away from equipment/machinery). He usually has a stable gait. Over the past few days, son describes him as \"slow, wobbly, & rundown.\" Not able to partake in his usual activities.     Son also shared that the patient has smoked 3.5 packs a day for most of his life. His son has been able to wean down the patient to 1 pack per day.     HPI: 78yoM brought by his family to ED for acting different. Son had concern for progressive weakness, fatigue, decreased PO intake, and a fall. Hx significant for Alzheimer's, HTN, HLD, CAD s/p stents in LAD & RCA 2005, CKD, tobacco use disorder. Admitting team unable to get a hold of family at the time of admission, remaining HPI is obtained from EMR.     The patient denies feeling any different or any falls. Of note, the patient has dementia. Per chart, 10 lb weight loss.     ED course  - Initial VTLs: Temp 97.9, RR 30, HR 47, /65, SpO2 100% on RA  - Labs:   - Cr 2.2 (baseline 1.5, has CKD), BUN 33  - ProBNP 632 (Stress test 2019 shows NL LV EF and no ischemia)  - Albumin 3.1 (previously normal up to April 2024), Transaminases 70s (previously normal up to April 2024)  - Hb 11.2 (11 this year, NL before this year), NL MCV & high RDW.   - PT 15.8, INR 1.25 (not on any anticoagulants, only on clopidogrel),   - High sensitivity troponin non-uptrending (40s > 30s).   - UA: largely unremarkable   - EKG: bradycardia, PVCs  - FX: Oriented to name only  on physical exam, No focal deficits   - Imaging:   - Chest CT w/o con: 4.5 cm mass in RLL invading RUL - heterogenous appearance, enlarged mediastinal LN, 2 mm nodule in L upper lobe, 2.9 cm lesion in R kidney. CTAP w/con recommended.   - MRI w/o con: L frontal & L cerebellar intracranial cystic lesions, concern for mets. L frontal lesion causes mass effect with 2 mm L to R midline shift.   - Non-con head CT: consistent with findings above, lesion not present in Feb 2023. No acute bleed   - CT c-spine: NL spine, R pleural effusion   - Tx: Keppra, Decadron, IVF     Objective     Vital Signs:  Patient Vitals for the past 8 hrs:   BP Temp Temp src Pulse Resp SpO2   06/20/24 0614 135/88 98 °F (36.7 °C) Oral 51 24 95 %   06/20/24 0029 (!) 142/67 97.4 °F (36.3 °C) Oral (!) 47 28 96 %       Physical Exam   Constitutional:       General: He is not in acute distress.     Appearance: He is not toxic-appearing or diaphoretic.   HENT:      Head: Normocephalic and atraumatic.      Nose: No congestion or rhinorrhea   Eyes:      EOMI, no discharge from either eyes, NL sclera   Cardiovascular:      Rate and Rhythm: Bradycardia present.   Abdominal:      Palpations: Abdomen is soft.      Tenderness: There is no abdominal tenderness. There is no guarding.   Musculoskeletal:      Right lower leg: No edema.      Left lower leg: No edema.   Skin:     General: Skin is warm and dry.      Coloration: Skin is not jaundiced.      Findings: No erythema.   Neurological:      He is alert. Oriented to name, knows he is at a hospital     No focal deficits. CN grossly intact.   Psychiatric:         Mood and Affect: Mood normal.     Labs:  CBC:   Recent Labs     06/19/24  1150 06/20/24  0509   WBC 6.5 5.3   HGB 11.2* 10.1*   HCT 34.2* 30.5*    215       BMP:   Recent Labs     06/19/24  1228 06/20/24  0509    140   K 5.0 3.4*    105   CO2 20* 19*   BUN 33* 33*   CREATININE 2.2* 2.2*   GLUCOSE 113* 94     Magnesium:   Recent Labs

## 2024-06-20 NOTE — CONSULTS
PULMONARY MEDICINE - INITIAL CONSULT NOTE    SUBJECTIVE:     CHIEF COMPLAINT / HPI:    The patient is a 78 y.o. male with significant past medical history of CAD and dementia that was admitted to the hospital for evaluation of weakness and fatigue.  Imaging showed R sided mass with mediastinal LN.   MRI head showed large metastatic appearing lesions.   Spoke with son on the phone about current status and whether he would want to proceed with biopsies and invasive procedures, he mentioned he wouldn't. Explained that the imaging findings are likely malignant and would be appropriate to talk to Palliative and Hospice to which he agreed.      Past Medical History:      Diagnosis Date    CAD (coronary artery disease)     Carotid bruit     Dementia (HCC)     ED (erectile dysfunction)     Hyperlipidemia     Hypertension     Tobacco abuse       Past Surgical History:        Procedure Laterality Date    CORONARY ANGIOPLASTY WITH STENT PLACEMENT      SKIN LESION EXCISION Right 11/6/2023    EXCISION SKIN LESION RIGHT POSTERIOR NECK TIMES TWO performed by Richard Armando MD at [facility] OR     Current Medications:     [Held by provider] clopidogrel  75 mg Oral Daily    donepezil  10 mg Oral Daily    ferrous sulfate  325 mg Oral Daily with breakfast    metoprolol succinate  50 mg Oral Daily    potassium chloride  10 mEq IntraVENous Q1H    nicotine  1 patch TransDERmal Daily    sodium chloride flush  5-40 mL IntraVENous 2 times per day    [Held by provider] enoxaparin  30 mg SubCUTAneous Daily    levETIRAcetam  500 mg IntraVENous Q12H    dexAMETHasone  4 mg IntraVENous Q6H    pantoprazole  40 mg IntraVENous Daily    amLODIPine  10 mg Oral Daily    rosuvastatin  40 mg Oral Nightly     Allergies:    Allergies   Allergen Reactions    Lisinopril Other (See Comments)     Tongue swelling    Memantine Other (See Comments)     Sedation    Ampicillin      Social History:    TOBACCO:   reports that he has been smoking cigarettes. He     ALKPHOS 84 06/20/2024 05:09 AM    ALT 79 06/20/2024 05:09 AM    ALT 77 06/20/2024 05:09 AM    AST 70 06/20/2024 05:09 AM    AST 69 06/20/2024 05:09 AM    PROT 7.3 09/19/2012 10:35 PM    BILITOT 0.5 06/20/2024 05:09 AM    BILITOT 0.5 06/20/2024 05:09 AM    BILIDIR <0.2 06/20/2024 05:09 AM    IBILI see below 06/20/2024 05:09 AM     ABG:  No results found for: \"ZVT7JNK\", \"BEART\", \"E9GOJQSX\", \"PHART\", \"THGBART\", \"MFG3YMQ\", \"PO2ART\", \"WDR5SYY\"  Procalcitonin:  No results found for: \"PROCAL\"  Cultures:   Results       Procedure Component Value Units Date/Time    Culture, Blood 1 [[id number]]     Order Status: Sent Specimen: Blood     Culture, Blood 2 [[id number]]     Order Status: Sent Specimen: Blood              Radiology Review:    All pertinent images / reports were reviewed as a part of this visit. imaging reveals the following:  CT chest 06/19/2024  Right hilar mass that appears to be obstructing the right upper lobe takeoff, with extensive mediastinal lymphadenopathies that include contralateral stations.    MRI BRAIN W CONTRAST   Final Result   1. Cystic lesions in the left frontal and left cerebellar regions as described, demonstrating peripheral enhancement with some areas of nodular enhancement as well. The nodular enhancement is more pronounced involving the left cerebellar hemisphere    lesion, but is also prominent in the inferolateral portion of the left frontal lesion.      Electronically signed by Edward Kaplan MD      MRI BRAIN WO CONTRAST   Final Result      Left frontal and left cerebellar intracranial cystic lesions, concerning for metastases. These are slightly limited in assessment without contrast. The left frontal lesion exerts mild local mass effect and compression of the left lateral ventricle with 3    mm left-to-right midline shift.      Mild atrophy and moderate chronic small vessel ischemic change.      Electronically signed by Foreign Jurado MD      CT ABDOMEN PELVIS WO CONTRAST

## 2024-06-20 NOTE — PROGRESS NOTES
Occupational Therapy  Attempt   Per RN pt going for MRI soon, will follow up as pt is available and as treatment schedule allows.     Sharon Woo, MOT, OTR/L, CNS

## 2024-06-20 NOTE — CARE COORDINATION
Received notification from JO Maya with Palliative Care stating pt's family wants referral to [facility].     Referral called to Chelsie at [facility] who states will contact son Yasmany and pull records from epic. She is aware pt's son stated he provided POA documents to staff yesterday.     Marizol FERRER, ROBYN   for [facility] ([facility])  [employer] Mobile: [phone]

## 2024-06-21 LAB
ALBUMIN SERPL-MCNC: 2.9 G/DL (ref 3.4–5)
ALP SERPL-CCNC: 76 U/L (ref 40–129)
ALT SERPL-CCNC: 56 U/L (ref 10–40)
ANION GAP SERPL CALCULATED.3IONS-SCNC: 13 MMOL/L (ref 3–16)
AST SERPL-CCNC: 35 U/L (ref 15–37)
BACTERIA BLD CULT ORG #2: NORMAL
BACTERIA BLD CULT: NORMAL
BASOPHILS # BLD: 0 K/UL (ref 0–0.2)
BASOPHILS NFR BLD: 0.2 %
BILIRUB DIRECT SERPL-MCNC: <0.2 MG/DL (ref 0–0.3)
BILIRUB INDIRECT SERPL-MCNC: ABNORMAL MG/DL (ref 0–1)
BILIRUB SERPL-MCNC: 0.3 MG/DL (ref 0–1)
BUN SERPL-MCNC: 44 MG/DL (ref 7–20)
CALCIUM SERPL-MCNC: 9.3 MG/DL (ref 8.3–10.6)
CHLORIDE SERPL-SCNC: 112 MMOL/L (ref 99–110)
CO2 SERPL-SCNC: 18 MMOL/L (ref 21–32)
CREAT SERPL-MCNC: 2 MG/DL (ref 0.8–1.3)
DEPRECATED RDW RBC AUTO: 18.7 % (ref 12.4–15.4)
EOSINOPHIL # BLD: 0 K/UL (ref 0–0.6)
EOSINOPHIL NFR BLD: 0 %
GFR SERPLBLD CREATININE-BSD FMLA CKD-EPI: 34 ML/MIN/{1.73_M2}
GLUCOSE BLD-MCNC: 145 MG/DL (ref 70–99)
GLUCOSE BLD-MCNC: 260 MG/DL (ref 70–99)
GLUCOSE SERPL-MCNC: 128 MG/DL (ref 70–99)
HCT VFR BLD AUTO: 29.9 % (ref 40.5–52.5)
HGB BLD-MCNC: 9.7 G/DL (ref 13.5–17.5)
LYMPHOCYTES # BLD: 0.4 K/UL (ref 1–5.1)
LYMPHOCYTES NFR BLD: 7.6 %
MAGNESIUM SERPL-MCNC: 2.1 MG/DL (ref 1.8–2.4)
MCH RBC QN AUTO: 26.8 PG (ref 26–34)
MCHC RBC AUTO-ENTMCNC: 32.6 G/DL (ref 31–36)
MCV RBC AUTO: 82 FL (ref 80–100)
MONOCYTES # BLD: 0.1 K/UL (ref 0–1.3)
MONOCYTES NFR BLD: 2.2 %
NEUTROPHILS # BLD: 4.8 K/UL (ref 1.7–7.7)
NEUTROPHILS NFR BLD: 90 %
PERFORMED ON: ABNORMAL
PERFORMED ON: ABNORMAL
PHOSPHATE SERPL-MCNC: 5.4 MG/DL (ref 2.5–4.9)
PLATELET # BLD AUTO: 228 K/UL (ref 135–450)
PMV BLD AUTO: 8.9 FL (ref 5–10.5)
POTASSIUM SERPL-SCNC: 3.8 MMOL/L (ref 3.5–5.1)
PROT SERPL-MCNC: 6.2 G/DL (ref 6.4–8.2)
RBC # BLD AUTO: 3.64 M/UL (ref 4.2–5.9)
SODIUM SERPL-SCNC: 143 MMOL/L (ref 136–145)
WBC # BLD AUTO: 5.4 K/UL (ref 4–11)

## 2024-06-21 PROCEDURE — 6360000002 HC RX W HCPCS: Performed by: FAMILY MEDICINE

## 2024-06-21 PROCEDURE — 36415 COLL VENOUS BLD VENIPUNCTURE: CPT

## 2024-06-21 PROCEDURE — 83735 ASSAY OF MAGNESIUM: CPT

## 2024-06-21 PROCEDURE — 6370000000 HC RX 637 (ALT 250 FOR IP)

## 2024-06-21 PROCEDURE — 2580000003 HC RX 258: Performed by: FAMILY MEDICINE

## 2024-06-21 PROCEDURE — 85025 COMPLETE CBC W/AUTO DIFF WBC: CPT

## 2024-06-21 PROCEDURE — 2580000003 HC RX 258

## 2024-06-21 PROCEDURE — 2060000000 HC ICU INTERMEDIATE R&B

## 2024-06-21 PROCEDURE — C9113 INJ PANTOPRAZOLE SODIUM, VIA: HCPCS

## 2024-06-21 PROCEDURE — 6360000002 HC RX W HCPCS

## 2024-06-21 PROCEDURE — 80069 RENAL FUNCTION PANEL: CPT

## 2024-06-21 PROCEDURE — 80076 HEPATIC FUNCTION PANEL: CPT

## 2024-06-21 RX ORDER — NICOTINE 21 MG/24HR
1 PATCH, TRANSDERMAL 24 HOURS TRANSDERMAL DAILY
Qty: 30 PATCH | Refills: 3 | Status: SHIPPED | OUTPATIENT
Start: 2024-06-22

## 2024-06-21 RX ORDER — POLYETHYLENE GLYCOL 3350 17 G/17G
17 POWDER, FOR SOLUTION ORAL DAILY PRN
Qty: 30 PACKET | Refills: 0 | Status: SHIPPED | OUTPATIENT
Start: 2024-06-21 | End: 2024-07-21

## 2024-06-21 RX ORDER — LEVETIRACETAM 500 MG/5ML
500 INJECTION, SOLUTION, CONCENTRATE INTRAVENOUS EVERY 12 HOURS
Qty: 5 ML | Refills: 0 | Status: SHIPPED | OUTPATIENT
Start: 2024-06-21 | End: 2024-06-24 | Stop reason: CLARIF

## 2024-06-21 RX ORDER — LORAZEPAM 2 MG/ML
1 CONCENTRATE ORAL
Qty: 15 ML | Refills: 0 | Status: SHIPPED | OUTPATIENT
Start: 2024-06-21 | End: 2024-07-05

## 2024-06-21 RX ORDER — OXYCODONE HCL 20 MG/ML
5 CONCENTRATE, ORAL ORAL
Qty: 9 ML | Refills: 0 | Status: SHIPPED | OUTPATIENT
Start: 2024-06-21 | End: 2024-06-24

## 2024-06-21 RX ADMIN — SODIUM CHLORIDE, PRESERVATIVE FREE 10 ML: 5 INJECTION INTRAVENOUS at 09:20

## 2024-06-21 RX ADMIN — DEXAMETHASONE SODIUM PHOSPHATE 4 MG: 4 INJECTION INTRA-ARTICULAR; INTRALESIONAL; INTRAMUSCULAR; INTRAVENOUS; SOFT TISSUE at 09:19

## 2024-06-21 RX ADMIN — DEXAMETHASONE SODIUM PHOSPHATE 4 MG: 4 INJECTION INTRA-ARTICULAR; INTRALESIONAL; INTRAMUSCULAR; INTRAVENOUS; SOFT TISSUE at 21:36

## 2024-06-21 RX ADMIN — SODIUM CHLORIDE: 9 INJECTION, SOLUTION INTRAVENOUS at 08:05

## 2024-06-21 RX ADMIN — PANTOPRAZOLE SODIUM 40 MG: 40 INJECTION, POWDER, FOR SOLUTION INTRAVENOUS at 09:19

## 2024-06-21 RX ADMIN — SODIUM CHLORIDE: 9 INJECTION, SOLUTION INTRAVENOUS at 00:50

## 2024-06-21 RX ADMIN — ROSUVASTATIN CALCIUM 40 MG: 20 TABLET, COATED ORAL at 21:36

## 2024-06-21 RX ADMIN — FERROUS SULFATE TAB 325 MG (65 MG ELEMENTAL FE) 325 MG: 325 (65 FE) TAB at 09:20

## 2024-06-21 RX ADMIN — AMLODIPINE BESYLATE 10 MG: 10 TABLET ORAL at 09:19

## 2024-06-21 RX ADMIN — DEXAMETHASONE SODIUM PHOSPHATE 4 MG: 4 INJECTION INTRA-ARTICULAR; INTRALESIONAL; INTRAMUSCULAR; INTRAVENOUS; SOFT TISSUE at 17:13

## 2024-06-21 RX ADMIN — DONEPEZIL HYDROCHLORIDE 10 MG: 10 TABLET ORAL at 09:24

## 2024-06-21 RX ADMIN — ENOXAPARIN SODIUM 30 MG: 100 INJECTION SUBCUTANEOUS at 09:18

## 2024-06-21 RX ADMIN — LEVETIRACETAM 500 MG: 500 INJECTION INTRAVENOUS at 21:44

## 2024-06-21 RX ADMIN — DEXAMETHASONE SODIUM PHOSPHATE 4 MG: 4 INJECTION INTRA-ARTICULAR; INTRALESIONAL; INTRAMUSCULAR; INTRAVENOUS; SOFT TISSUE at 04:37

## 2024-06-21 RX ADMIN — LEVETIRACETAM 500 MG: 500 INJECTION INTRAVENOUS at 09:19

## 2024-06-21 RX ADMIN — SODIUM CHLORIDE, PRESERVATIVE FREE 10 ML: 5 INJECTION INTRAVENOUS at 21:37

## 2024-06-21 NOTE — DISCHARGE INSTR - COC
Continuity of Care Form    Patient Name: Jacob Rosales   :  1945  MRN:  [id number]    Admit date:  2024  Discharge date:  ***    Code Status Order: DNR-CC   Advance Directives:     Admitting Physician:  Felipa De Oliveira MD  PCP: Norberto Hwang MD    Discharging Nurse: ***  Discharging Hospital Unit/Room#: 3527/3527-01  Discharging Unit Phone Number: ***    Emergency Contact:   Extended Emergency Contact Information  Primary Emergency Contact: Yasmany Rosales   [facility]  Home Phone: [phone]  Mobile Phone: [phone]  Relation: Child  Secondary Emergency Contact: JERAD RICHARDSON  Home Phone: [phone]  Relation: Child    Past Surgical History:  Past Surgical History:   Procedure Laterality Date    CORONARY ANGIOPLASTY WITH STENT PLACEMENT      SKIN LESION EXCISION Right 2023    EXCISION SKIN LESION RIGHT POSTERIOR NECK TIMES TWO performed by Richard Armando MD at [facility] OR       Immunization History:   Immunization History   Administered Date(s) Administered    COVID-19, PFIZER PURPLE top, DILUTE for use, (age 12 y+), 30mcg/0.3mL 2021, 2021    Influenza 10/23/2013    Influenza Virus Vaccine 10/23/2013, 11/10/2014, 2015    Influenza, AFLURIA (age 3 yrs+), FLUZONE, (age 6 mo+), MDV, 0.5mL 11/10/2016, 2017, 2019    Influenza, FLUAD, (age 65 y+), Adjuvanted, 0.5mL 10/11/2023    Influenza, FLUARIX, FLULAVAL, FLUZONE (age 6 mo+) AND AFLURIA, (age 3 y+), PF, 0.5mL 2020    Pneumococcal, PCV-13, PREVNAR 13, (age 6w+), IM, 0.5mL 2015    Pneumococcal, PPSV23, PNEUMOVAX 23, (age 2y+), SC/IM, 0.5mL 2013    TDaP, ADACEL (age 10y-64y), BOOSTRIX (age 10y+), IM, 0.5mL 2015       Active Problems:  Patient Active Problem List   Diagnosis Code    Essential hypertension, benign I10    Tobacco abuse Z72.0    ED (erectile dysfunction) N52.9    Vitamin D deficiency E55.9    Coronary artery disease due to lipid rich plaque I25.10,

## 2024-06-21 NOTE — CARE COORDINATION
CM spoke with [facility], they have met with family this AM, consents signed and will fully enroll patient once he gets home later today. FRANCINE sent perfectserve to attending MD regarding placing DC order for patient.    2:46 PM  FRANCINE received message from [facility] nurse, she reports that equipment will be delivered to the home 06/22/24 at 10am and patients son will transport patient home at that time. CM will update MD.    Thank you,  Hamlet MCLAUGHLINN, RN,   [phone]

## 2024-06-21 NOTE — PLAN OF CARE
Problem: Safety - Adult  Goal: Free from fall injury  6/21/2024 1805 by Bia Sr, RN  Outcome: Progressing  Flowsheets (Taken 6/21/2024 0828)  Free From Fall Injury: Instruct family/caregiver on patient safety  6/21/2024 0451 by Christiano Ramos, RN  Outcome: Progressing  Note:  High Fall Risk per ZAPATA/ABCDS: Explained fall risk precautions to pt and family and rationale behind their use to keep the patient safe. Pt bed is in low position, side rails up, call light and belongings are in reach. Fall wristband applied and present on pts wrist.  Bed alarm on.  Pt encouraged to call for assistance. Will continue with hourly rounds for PO intake, pain needs, toileting and repositioning as needed.        Problem: Skin/Tissue Integrity - Adult  Goal: Skin integrity remains intact  6/21/2024 0451 by Christiano Ramos, RN  Outcome: Progressing  Note: No new skin break down noted this shift.

## 2024-06-21 NOTE — PROGRESS NOTES
Palliative Care Chart Review  and Check in Note:     NAME:  Jacob Rosales  Admit Date: 6/19/2024  Hospital Day:  Hospital Day: 3   Current Code status: DNR-CC    Palliative care is continuing to following Mr. Rosales for symptom management,  and goals of care discussion as needed. Patient's chart reviewed today 6/21/24.      Pt resting comfortably in bed with eyes closed.       The following are the currently established goals/code status, and Symptom management.     Goals of care: Goals are comfort directed    Code status: DNRCC    Discharge plan: Home with hospice, referral made to [facility]      Zulma Ramos, MOSES - CNP  06/21/24  9:03 AM

## 2024-06-21 NOTE — PROGRESS NOTES
Pt and family met with [facility] RN to discuss plan and RN requests this nurse reach out to Dr. Saravia to provide plan and needed medications at discharge.  PerfectServe sent to Dr. Saravia to inform that Hospice met with pt/family and plans to admit to services tomorrow once equipment is delivered and medications needed for discharge.

## 2024-06-21 NOTE — DISCHARGE SUMMARY
INTERNAL MEDICINE DEPARTMENT AT THE [facility]  DISCHARGE SUMMARY    Patient ID: Jacob Rosales                                             Discharge Date: 6/21/24   Patient's PCP: Norberto Hwang MD                                          Discharge Physician: Scott Saravia MD  Admit Date: 6/19/2024   Admitting Physician: Felipa De Oliveira MD    PROBLEMS DURING HOSPITALIZATION:  Present on Admission:   Metastatic cancer to brain (HCC)      HPI:  77 yo M with PMH dementia, HTN, HLD, CAD, tobacco use disorder, who presented to [facility] with cc weakness, fatigue, fall.   History is obtained by reviewing EMR. Patient is not able to provide much history.  According to the charts, patient presented with progressive weakness fatigue and a fall.  ED note indicates that patient's son stated that patient had been experiencing lethargy and decreased PO intake.  Patient denies any acute symptoms.     Hospital course:  Initial Vitals: T 97.9, RR 30, HR 47, /65, SpO2 100% on RA   Labs showed Hg 11.2, Cr 2.2, BUN 33, Glucose 113, ProBNP 632, ALT 92, AST 98,   CT chest without contrast showed 4.5 cm mass in RLL, 2 mm pulmonary nodule in EBONY, multiple mildly enlarged mediastinal lymph nodes  MRI brain without contrast showed left frontal and left cerebellar intracranial cystic lesions, concerning for metastases. The left frontal lesion exerts mild local mass effect and compression of the left lateral ventricle with 3  mm left-to-right midline shift   EKG showed sinus bradycardia with PVCs     He was admitted for further evaluation and management.    The following issues were addressed during hospitalization:    For his lung lesion with likely mets, per next of kin (3 children), patient's wishes most align with not getting aggressive workup and definitely foregoing treatment. Neurosurgery and pulmonology teams consulted. All are in agreement.     His BETY wsa treated with fluids.     Other home meds continued.      At the time of discharge, patient reported feeling stable. They were not in acute distress and vital signs were within normal limits. Further, the patient expressed appropriate understanding of, and agreement with, the discharge recommendations, medications, and plan. Patient was advised to seek immediate attention or to call 911 if they experience recurrence or worsening of symptoms.     Physical Exam  Constitutional:       General: He is not in acute distress.     Appearance: Normal appearance. He is not ill-appearing or toxic-appearing.   HENT:      Head: Normocephalic and atraumatic.      Nose: No congestion or rhinorrhea.   Eyes:      General: No scleral icterus.        Right eye: No discharge.         Left eye: No discharge.      Extraocular Movements: Extraocular movements intact.      Conjunctiva/sclera: Conjunctivae normal.   Cardiovascular:      Heart sounds: No murmur heard.  Pulmonary:      Effort: Pulmonary effort is normal. No respiratory distress.   Abdominal:      General: There is no distension.      Palpations: Abdomen is soft.   Musculoskeletal:      Cervical back: No rigidity or tenderness.      Right lower leg: No edema.      Left lower leg: No edema.   Neurological:      Mental Status: He is alert.        Consults: Neurology, Neurosurgery, Hematology/Oncology, Palliative, Pulmonology  Significant Diagnostic Studies:    CT ABDOMEN PELVIS WO CONTRAST Additional Contrast? Radiologist Recommendation   Final Result      1. No evidence for metastatic disease of the abdomen or pelvis.      2. Focal lobulation of the cortex upper pole right kidney. No discrete mass,   however, follow-up study with contrast would be beneficial, CT or MRI.      3. Abdominal aortic aneurysm, stable.      Electronically signed by Vicente Singh      MRI BRAIN W CONTRAST   Final Result   1. Cystic lesions in the left frontal and left cerebellar regions as described, demonstrating peripheral enhancement with some areas

## 2024-06-21 NOTE — PLAN OF CARE
Problem: Safety - Adult  Goal: Free from fall injury  6/21/2024 0451 by Christiano Ramos, RN  Outcome: Progressing  Note:  High Fall Risk per ZAPATA/ABCDS: Explained fall risk precautions to pt and family and rationale behind their use to keep the patient safe. Pt bed is in low position, side rails up, call light and belongings are in reach. Fall wristband applied and present on pts wrist.  Bed alarm on.  Pt encouraged to call for assistance. Will continue with hourly rounds for PO intake, pain needs, toileting and repositioning as needed.        Problem: Skin/Tissue Integrity - Adult  Goal: Skin integrity remains intact  6/21/2024 0451 by Christiano Ramos, RN  Outcome: Progressing  Note: No new skin break down noted this shift.     Problem: Infection - Adult  Goal: Absence of infection during hospitalization  Outcome: Progressing  Note: Afebrile, no signs of any infection     Problem: Metabolic/Fluid and Electrolytes - Adult  Goal: Electrolytes maintained within normal limits  Outcome: Progressing  Note: No electrolyte replacement needed this AM     Problem: Hematologic - Adult  Goal: Maintains hematologic stability  Outcome: Progressing  Note: Patient's hemoglobin this AM:   Recent Labs     06/21/24  0414   HGB 9.7*     Patient's platelet count this AM:   Recent Labs     06/21/24  0414       Thrombocytopenia not present at this time.  Patient showing no signs or symptoms of active bleeding.  Transfusion not indicated at this time.  Patient verbalizes understanding of all instructions. Will continue to assess and implement POC. Call light within reach and hourly rounding in place.

## 2024-06-21 NOTE — CONSULTS
NEUROSURGERY CONSULT NOTE    EFRAIN GREEN  [id number]   1945   6/20/2024    Requesting physician: Felipa De Oliveira MD    Reason for consultation: Brain mass, 4 cm fluid containing lesion left frontal lobe, concerning for metastasis    History of present illness: Patient is a 78 y.o. male w/ PMH of Alzheimer's Dementia, HTN, HLD, CAD, tobacco use disorder who presented to [facility] as a transfer from [facility] with complaints of weakness, fatigue, and fall. CT chest reveals suspected 4.5 cm mass in the superior segment of the right lower lobe invading the posterior right upper lobe and is concerning for endobronchial invasion, multiple mildly enlarged mediastinal lymph nodes, 2 mm pulmonary nodule in the left upper lobe, and a new 2.9 cm indeterminate lesion in the posterosuperior right kidney which could represent cyst or mass lesion.  CT head reveals 4cm fluid containing lesion left frontal lobe. Neurosurgery was consulted for further evaluation.    Spoke with patient's son, Yasmany, who provided history of present illness. Son has been living with patient in patient's home for the past 2-3 years. At baseline, patient is oriented to self only. He is active in his garden and ambulates without assistance. He has had 2 falls in the past 1-2 months due to legs appearing to \"give out\" but has not hit his head or lost consciousness. No falls in the past several weeks.     In the past 7-10 days, patient with increased lethargy and fatigue, just wanting to sit on the couch. Patient states he is \"not feeling himself\". His appetite has been somewhat decreased, but son reports this has been ongoing over the past year.     Of note, son reports patient's PSA levels have been elevated for the past 2-3 years. Per patient's PCP, a prostate biopsy was recently completed which showed possible cancer in 3 of the biopsies. They were planning to follow-up with Urology. Son is unsure of when patient's last

## 2024-06-22 VITALS
BODY MASS INDEX: 21.21 KG/M2 | HEART RATE: 52 BPM | SYSTOLIC BLOOD PRESSURE: 107 MMHG | DIASTOLIC BLOOD PRESSURE: 55 MMHG | HEIGHT: 66 IN | RESPIRATION RATE: 14 BRPM | TEMPERATURE: 97.2 F | OXYGEN SATURATION: 94 % | WEIGHT: 132 LBS

## 2024-06-22 LAB
GLUCOSE BLD-MCNC: 139 MG/DL (ref 70–99)
GLUCOSE BLD-MCNC: 227 MG/DL (ref 70–99)
PERFORMED ON: ABNORMAL
PERFORMED ON: ABNORMAL

## 2024-06-22 PROCEDURE — 6360000002 HC RX W HCPCS: Performed by: FAMILY MEDICINE

## 2024-06-22 PROCEDURE — 2580000003 HC RX 258: Performed by: FAMILY MEDICINE

## 2024-06-22 PROCEDURE — C9113 INJ PANTOPRAZOLE SODIUM, VIA: HCPCS

## 2024-06-22 PROCEDURE — 6370000000 HC RX 637 (ALT 250 FOR IP)

## 2024-06-22 PROCEDURE — 6360000002 HC RX W HCPCS

## 2024-06-22 RX ORDER — DEXAMETHASONE 4 MG/1
TABLET ORAL
Qty: 70 TABLET | Refills: 0 | Status: SHIPPED | OUTPATIENT
Start: 2024-06-22

## 2024-06-22 RX ADMIN — ENOXAPARIN SODIUM 30 MG: 100 INJECTION SUBCUTANEOUS at 08:10

## 2024-06-22 RX ADMIN — AMLODIPINE BESYLATE 10 MG: 10 TABLET ORAL at 08:10

## 2024-06-22 RX ADMIN — FERROUS SULFATE TAB 325 MG (65 MG ELEMENTAL FE) 325 MG: 325 (65 FE) TAB at 08:10

## 2024-06-22 RX ADMIN — LEVETIRACETAM 500 MG: 500 INJECTION INTRAVENOUS at 11:19

## 2024-06-22 RX ADMIN — PANTOPRAZOLE SODIUM 40 MG: 40 INJECTION, POWDER, FOR SOLUTION INTRAVENOUS at 08:10

## 2024-06-22 RX ADMIN — DEXAMETHASONE SODIUM PHOSPHATE 4 MG: 4 INJECTION INTRA-ARTICULAR; INTRALESIONAL; INTRAMUSCULAR; INTRAVENOUS; SOFT TISSUE at 04:36

## 2024-06-22 RX ADMIN — SODIUM CHLORIDE, PRESERVATIVE FREE 10 ML: 5 INJECTION INTRAVENOUS at 10:38

## 2024-06-22 RX ADMIN — DEXAMETHASONE SODIUM PHOSPHATE 4 MG: 4 INJECTION INTRA-ARTICULAR; INTRALESIONAL; INTRAMUSCULAR; INTRAVENOUS; SOFT TISSUE at 11:19

## 2024-06-22 RX ADMIN — METOPROLOL SUCCINATE 50 MG: 50 TABLET, EXTENDED RELEASE ORAL at 08:14

## 2024-06-22 NOTE — PLAN OF CARE
Problem: ABCDS Injury Assessment  Goal: Absence of physical injury  Outcome: Progressing     Problem: Safety - Adult  Goal: Free from fall injury  6/21/2024 2102 by Flori Potter RN  Outcome: Progressing  6/21/2024 1805 by Bia Sr, RN  Outcome: Progressing  Flowsheets (Taken 6/21/2024 0828)  Free From Fall Injury: Instruct family/caregiver on patient safety     Problem: Skin/Tissue Integrity - Adult  Goal: Skin integrity remains intact  Outcome: Progressing     Problem: Infection - Adult  Goal: Absence of infection during hospitalization  Outcome: Progressing     Problem: Metabolic/Fluid and Electrolytes - Adult  Goal: Electrolytes maintained within normal limits  6/21/2024 2102 by Flori Potter, RN  Outcome: Progressing  6/21/2024 1805 by Bia Sr, RN  Outcome: Progressing     Problem: Hematologic - Adult  Goal: Maintains hematologic stability  Outcome: Progressing

## 2024-06-22 NOTE — DISCHARGE INSTR - DIET

## 2024-06-22 NOTE — DISCHARGE INSTRUCTIONS
Please follow-up with your primary care physician in 2 weeks.     Please continue to take rest of your medications as prescribed.    Please contact your primary care physician immediately if you have any symptoms of abdominal pain, fever, or any other new symptoms that needs attention.

## 2024-06-22 NOTE — PLAN OF CARE
Problem: ABCDS Injury Assessment  Goal: Absence of physical injury  Flowsheets (Taken 6/22/2024 1322)  Absence of Physical Injury: Implement safety measures based on patient assessment     Problem: Safety - Adult  Goal: Free from fall injury  Recent Flowsheet Documentation  Taken 6/22/2024 1320 by Loli Thompson RN  Free From Fall Injury: Instruct family/caregiver on patient safety     Problem: ABCDS Injury Assessment  Goal: Absence of physical injury  Flowsheets (Taken 6/22/2024 1322)  Absence of Physical Injury: Implement safety measures based on patient assessment     Problem: Safety - Adult  Goal: Free from fall injury  Recent Flowsheet Documentation  Taken 6/22/2024 1320 by Loli Thompson RN  Free From Fall Injury: Instruct family/caregiver on patient safety     Problem: Infection - Adult  Goal: Absence of infection during hospitalization  Flowsheets (Taken 6/22/2024 1322)  Absence of infection during hospitalization: Assess and monitor for signs and symptoms of infection

## 2024-06-22 NOTE — PROGRESS NOTES
V2.0    [facility] Progress Note      Pt was seen and examined. No new changes or complaints. Plan to DC home with hospice     Scott Hatfield

## 2024-06-22 NOTE — PROGRESS NOTES
Reviewed discharge instructions with patient and  family members.  Reviewed discharge medications including dosing, schedule, indication, and adverse reactions.  Reviewed which medications were already taken today and next dosage due for each medication.      Reviewed signs and symptoms that prompt a call to the physician and appropriate phone numbers. Purple ER card given to the patient with explanations of its use.  Reviewed follow up appointments that have been made in [facility] and Outpatient Oncology.  Low microbial diet, activity restrictions, and increased risk of infection were reviewed.       Patient verbalized understanding of all instructions and questions were answered to his. satisfaction.  Signed discharge instructions were given to the patient and a copy placed in the paper-lite chart.  Patient discharged to home per wheelchair with family members.      Loli Thompson RN

## 2024-06-24 ENCOUNTER — TELEPHONE (OUTPATIENT)
Dept: FAMILY MEDICINE CLINIC | Age: 79
End: 2024-06-24

## 2024-06-24 LAB
BACTERIA BLD CULT ORG #2: NORMAL
BACTERIA BLD CULT: NORMAL

## 2024-06-24 RX ORDER — LEVETIRACETAM 500 MG/1
500 TABLET ORAL 2 TIMES DAILY
COMMUNITY

## 2024-06-24 NOTE — TELEPHONE ENCOUNTER
[facility] Transitions Initial Follow Up Call    Outreach made within 2 business days of discharge: Yes    Patient: Jacob Rosales Patient : 1945   MRN: [id number]  Reason for Admission: There are no discharge diagnoses documented for the most recent discharge.  Discharge Date: 24       Spoke with: Richard/ Yasmany    Discharge department/facility: [facility]    Non-face-to-face services provided:  Scheduled appointment with PCP-   Obtained and reviewed discharge summary and/or continuity of care documents    TCM Interactive Patient Contact:  Was patient able to fill all prescriptions: Yes  Was patient instructed to bring all medications to the follow-up visit: Yes  Is patient taking all medications as directed in the discharge summary? Yes  Does patient understand their discharge instructions: Yes  Does patient have questions or concerns that need addressed prior to 7-14 day follow up office visit: no    Scheduled appointment with PCP within 7-14 days    Follow Up  Future Appointments   Date Time Provider Department Center   2024  3:20 PM Norberto Hwang MD [place] [facility] [place] [facility] [place]   2024  9:00 AM [facility] 1 [facility] AS[facility] Ulysses Rad   2024  7:00 AM SCHEDULE, [facility] SR[facility]I GIL[facility] [place] [facility] [place]   2024  7:40 AM Norberto Hwang MD [place]  Susanna TUBBS RN

## 2024-06-24 NOTE — TELEPHONE ENCOUNTER
SANTO Figueroa from [facility] / hospice  [phone]   Questions if  Keppra is prn?    RN spoke to PCP.    Keppra 500 mg po Q 12 hour.    RN called back Carolina and LMOR with call back #.

## 2024-07-01 ENCOUNTER — TELEMEDICINE (OUTPATIENT)
Dept: FAMILY MEDICINE CLINIC | Age: 79
End: 2024-07-01
Payer: MEDICARE

## 2024-07-01 DIAGNOSIS — C34.90 LUNG CANCER METASTATIC TO BRAIN (HCC): Primary | ICD-10-CM

## 2024-07-01 DIAGNOSIS — C79.31 LUNG CANCER METASTATIC TO BRAIN (HCC): Primary | ICD-10-CM

## 2024-07-01 PROCEDURE — 99442 PR PHYS/QHP TELEPHONE EVALUATION 11-20 MIN: CPT | Performed by: FAMILY MEDICINE

## 2024-07-01 NOTE — PROGRESS NOTES
Documentation:  I communicated with the patient and/or health care decision maker about see below.   Details of this discussion including any medical advice provided: See below    Total Time: minutes: 5-10 minutes    Jacob Rosales was evaluated through a synchronous (real-time) audio encounter. Patient identification was verified at the start of the visit. He (or guardian if applicable) is aware that this is a billable service, which includes applicable co-pays. This visit was conducted with the patient's (and/or legal guardian's) verbal consent. He has not had a related appointment within my department in the past 7 days or scheduled within the next 24 hours.   The patient was located at Home: [place] [id number].  The provider was located at Facility (Appt Dept): [address] [place] [address].    Note: not billable if this call serves to triage the patient into an appointment for the relevant concern  Yes, I confirm.          Shasta Madrid    Jacob Rosales is a 78 y.o. male evaluated via telephone on 7/1/2024.      Follow up from hospital 6/19-6/22 [facility] for metastatic brain cancer. States he is doing very well with no concerns at this time.      Internal Administration   First Dose COVID-19, PFIZER PURPLE top, DILUTE for use, (age 12 y+), 30mcg/0.3mL  02/13/2021   Second Dose COVID-19, PFIZER PURPLE top, DILUTE for use, (age 12 y+), 30mcg/0.3mL   03/06/2021       Last COVID Lab POC Glucose (mg/dl)   Date Value   06/22/2024 227 (H)            Wt Readings from Last 3 Encounters:   06/21/24 59.9 kg (132 lb)   06/19/24 60.7 kg (133 lb 12.8 oz)   04/17/24 65.8 kg (145 lb)     BP Readings from Last 3 Encounters:   06/22/24 (!) 107/55   06/19/24 132/88   04/17/24 139/72     Lab Results   Component Value Date    LABA1C 6.1 04/17/2024    LABA1C 5.4 02/07/2023    LABA1C 5.7 07/12/2022        ASSESSMENT PLAN      Diagnosis Orders   1. 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

## 2024-08-07 ENCOUNTER — TELEPHONE (OUTPATIENT)
Dept: FAMILY MEDICINE CLINIC | Age: 79
End: 2024-08-07

## 2024-08-07 NOTE — TELEPHONE ENCOUNTER
Daughter Khris called and was wanting to see if PCP can sign for FLMA for  her this week for her since she is being his caregiver this week. Please advise.       Call daughter back at [phone], she states she can bring them up before noon today

## 2024-08-07 NOTE — TELEPHONE ENCOUNTER
Received a call from Son ,Yasmany.     He was wanting to talk to you regarding his sister and her [facility] paper.     [phone].     He states he feels like she does not qualify for [facility]. She is not helping with caregiver    Please advise.

## 2024-08-07 NOTE — TELEPHONE ENCOUNTER
Called kristina back and her  answered. Informed him that since kristina is not on patients most recent HIPAA form we can not complete FMLA paperwork for her due to that disclosing information about the patient

## 2024-08-07 NOTE — TELEPHONE ENCOUNTER
I would suggest that Yasmany and the daughter who wants the FMLA forms to talk about the providing of care to then determine if the FMLA form is necessary, as there appears to be a difference of opinion amongst the family members

## (undated) DEVICE — GOWN,AURORA,NONREINF,RAGLAN,XXL,STERILE: Brand: MEDLINE

## (undated) DEVICE — SOAP BAR DIAL ANTIBACTERIAL 3.5 OZ

## (undated) DEVICE — CANNULA NSL 13FT TUBE AD ETCO2 DIV SAMP M

## (undated) DEVICE — TIP SUCT DIA12FR W STYL CTRL VENT DISPOSABLE FRAZ

## (undated) DEVICE — 3M™ STERI-STRIP™ COMPOUND BENZOIN TINCTURE 40 BAGS/CARTON 4 CARTONS/CASE C1544: Brand: 3M™ STERI-STRIP™

## (undated) DEVICE — SUTURE VCRL SZ 3-0 L18IN ABSRB UD L26MM SH 1/2 CIR J864D

## (undated) DEVICE — SYRINGE MED 10ML LUERLOCK TIP W/O SFTY DISP

## (undated) DEVICE — SOLUTION IV IRRIG 500ML 0.9% SODIUM CHL 2F7123

## (undated) DEVICE — APPLICATOR MEDICATED 26 CC SOLUTION HI LT ORNG CHLORAPREP

## (undated) DEVICE — GLOVE ORANGE PI 8 1/2   MSG9085

## (undated) DEVICE — GOWN SIRUS NONREIN XL W/TWL: Brand: MEDLINE INDUSTRIES, INC.

## (undated) DEVICE — PACK,UNIVERSAL,SPLIT,II,AURORA: Brand: MEDLINE

## (undated) DEVICE — ELECTRODE PT RET AD L9FT HI MOIST COND ADH HYDRGEL CORDED

## (undated) DEVICE — NEEDLE HYPO 25GA L1.5IN BLU POLYPR HUB S STL REG BVL STR

## (undated) DEVICE — MAJOR SET UP PK

## (undated) DEVICE — Z INACTIVE USE 2855096 SPONGE GZ W4XL4IN 8 PLY 100% COT

## (undated) DEVICE — SUTURE VCRL SZ 4-0 L18IN ABSRB UD L19MM PS-2 3/8 CIR PRIM J496H